# Patient Record
Sex: MALE | Race: BLACK OR AFRICAN AMERICAN | NOT HISPANIC OR LATINO | Employment: OTHER | URBAN - METROPOLITAN AREA
[De-identification: names, ages, dates, MRNs, and addresses within clinical notes are randomized per-mention and may not be internally consistent; named-entity substitution may affect disease eponyms.]

---

## 2017-01-20 RX ORDER — OMEPRAZOLE 40 MG/1
40 CAPSULE, DELAYED RELEASE ORAL DAILY
Qty: 30 CAPSULE | Refills: 0 | Status: SHIPPED | OUTPATIENT
Start: 2017-01-20 | End: 2017-03-07 | Stop reason: SDUPTHER

## 2017-01-27 ENCOUNTER — OFFICE VISIT (OUTPATIENT)
Dept: FAMILY MEDICINE | Facility: CLINIC | Age: 47
End: 2017-01-27
Payer: MEDICARE

## 2017-01-27 VITALS
TEMPERATURE: 98 F | RESPIRATION RATE: 18 BRPM | WEIGHT: 174.63 LBS | BODY MASS INDEX: 30.94 KG/M2 | HEART RATE: 78 BPM | HEIGHT: 63 IN | SYSTOLIC BLOOD PRESSURE: 114 MMHG | DIASTOLIC BLOOD PRESSURE: 82 MMHG | OXYGEN SATURATION: 97 %

## 2017-01-27 DIAGNOSIS — M45.9 ANKYLOSING SPONDYLITIS: ICD-10-CM

## 2017-01-27 DIAGNOSIS — M51.37 DEGENERATION OF LUMBAR OR LUMBOSACRAL INTERVERTEBRAL DISC: Primary | ICD-10-CM

## 2017-01-27 PROCEDURE — 99213 OFFICE O/P EST LOW 20 MIN: CPT | Mod: PBBFAC,PO | Performed by: FAMILY MEDICINE

## 2017-01-27 PROCEDURE — 99214 OFFICE O/P EST MOD 30 MIN: CPT | Mod: S$PBB,,, | Performed by: FAMILY MEDICINE

## 2017-01-27 PROCEDURE — 99999 PR PBB SHADOW E&M-EST. PATIENT-LVL III: CPT | Mod: PBBFAC,,, | Performed by: FAMILY MEDICINE

## 2017-01-27 RX ORDER — OXYCODONE AND ACETAMINOPHEN 10; 325 MG/1; MG/1
1 TABLET ORAL 3 TIMES DAILY PRN
Qty: 90 TABLET | Refills: 0 | Status: SHIPPED | OUTPATIENT
Start: 2017-02-24 | End: 2017-03-26

## 2017-01-27 RX ORDER — OXYCODONE AND ACETAMINOPHEN 10; 325 MG/1; MG/1
1 TABLET ORAL 3 TIMES DAILY PRN
Qty: 90 TABLET | Refills: 0 | Status: SHIPPED | OUTPATIENT
Start: 2017-03-24 | End: 2017-04-27 | Stop reason: SDUPTHER

## 2017-01-27 RX ORDER — OXYCODONE AND ACETAMINOPHEN 10; 325 MG/1; MG/1
1 TABLET ORAL 3 TIMES DAILY PRN
Qty: 90 TABLET | Refills: 0 | Status: SHIPPED | OUTPATIENT
Start: 2017-01-27 | End: 2017-02-26

## 2017-01-27 RX ORDER — FERROUS SULFATE 324(65)MG
324 TABLET, DELAYED RELEASE (ENTERIC COATED) ORAL
COMMUNITY
End: 2017-07-13 | Stop reason: SDUPTHER

## 2017-01-27 NOTE — PROGRESS NOTES
Chief Complaint   Patient presents with    Back Pain       Beau Ferraro is a 46 y.o. male who presents per the Chief Complaint.  Pt is known to me and was last seen by me on 11/14/2016.  All known chronic medical issues have been documented.       Chronic Pain  Past Medical History includes: rheumatoid arthritis and chronic pain syndrome. Patient states that the pain is chronic. Beau describes pain involving the cervical spine, lumbar spine, thoracic spine, knee and hip. The onset of his pain began more than 1 year ago. The frequency of pain is described as constantly. Progression of pain since onset is waxing and waning. Patient describes pain as a 8/10. Beau is currently treating his symptoms with oxycodone/acetaminophen (Percocet) and tramadol. Patient has shown moderate improvement with current treatment.  Goals of therapy include: Improve ADL's. Beau has previously tried hydrocodone/acetaminophen (Hycet, Lorcet, Lortab, Norco,Vicodin), Nerve Block, NSAIDs, Gabapentin (Neurontin) and oxycodone/acetaminophen (Percocet) to treat his pain. Associated symptoms include: abdominal pain, leg pain, paresis, tingling and ADLs performed poorly. Previous Imaging studies include: MRI Scan and X-Ray.  Patient has not had a drug screen. Patient does have a pain contract. Prescription Monitoring Program has been reviewed.. Patient's history of substance abuse includes: none. Patient's psychiatric disorders include: none.       ROS  Review of Systems   Constitutional: Negative.  Negative for activity change, appetite change, chills, diaphoresis, fatigue, fever, unexpected weight change and weight loss.   HENT: Negative.  Negative for congestion, ear pain, hearing loss, nosebleeds, postnasal drip, rhinorrhea, sinus pressure, sneezing, sore throat and trouble swallowing.    Eyes: Negative for pain and visual disturbance.   Respiratory: Negative for cough, choking, chest tightness and shortness of breath.   "  Cardiovascular: Negative for chest pain and leg swelling.   Gastrointestinal: Positive for abdominal pain. Negative for abdominal distention, anal bleeding, blood in stool, bowel incontinence, constipation, diarrhea, nausea, rectal pain and vomiting.   Genitourinary: Negative for bladder incontinence, difficulty urinating, dysuria, frequency and urgency.   Musculoskeletal: Positive for arthralgias, back pain, gait problem, myalgias, neck pain and neck stiffness. Negative for joint swelling.   Skin: Negative.    Allergic/Immunologic: Negative for environmental allergies and food allergies.   Neurological: Positive for tingling. Negative for dizziness, seizures, syncope, weakness, light-headedness, numbness, headaches and paresthesias.   Psychiatric/Behavioral: Negative.  Negative for confusion, decreased concentration, dysphoric mood and sleep disturbance. The patient is not nervous/anxious.        Physical Exam  Vitals:    01/27/17 1455   BP: 114/82   Pulse: 78   Resp: 18   Temp: 98.2 °F (36.8 °C)    Body mass index is 30.93 kg/(m^2).  Weight: 79.2 kg (174 lb 9.7 oz)   Height: 5' 3" (160 cm)     Physical Exam   Constitutional: He is oriented to person, place, and time. He appears well-developed and well-nourished. He is active and cooperative.  Non-toxic appearance. He does not have a sickly appearance. He does not appear ill. No distress.   HENT:   Head: Normocephalic and atraumatic.   Right Ear: Hearing, external ear and ear canal normal. No decreased hearing is noted.   Left Ear: Hearing and external ear normal. No decreased hearing is noted.   Nose: Nose normal. No rhinorrhea or nasal deformity.   Mouth/Throat: Uvula is midline and oropharynx is clear and moist. He does not have dentures. Normal dentition.   Eyes: Conjunctivae, EOM and lids are normal. Pupils are equal, round, and reactive to light. Right eye exhibits no chemosis, no discharge and no exudate. No foreign body present in the right eye. Left eye " exhibits no chemosis, no discharge and no exudate. No foreign body present in the left eye. No scleral icterus.   Neck: Normal range of motion and full passive range of motion without pain. Neck supple. No thyromegaly present.   Cardiovascular: Normal rate, regular rhythm, S1 normal, S2 normal and normal heart sounds.  Exam reveals no gallop and no friction rub.    No murmur heard.  Pulmonary/Chest: Effort normal and breath sounds normal. No accessory muscle usage. No respiratory distress. He has no decreased breath sounds. He has no wheezes. He has no rhonchi. He has no rales.   Abdominal: Soft. Normal appearance. He exhibits no distension and no mass. There is no hepatosplenomegaly. There is no tenderness. There is no rigidity, no rebound and no guarding.   Musculoskeletal:        Right hip: He exhibits tenderness and bony tenderness. He exhibits normal range of motion, normal strength, no swelling, no crepitus, no deformity and no laceration.        Left hip: He exhibits tenderness and bony tenderness. He exhibits normal range of motion, normal strength, no swelling, no crepitus, no deformity and no laceration.        Cervical back: He exhibits decreased range of motion and pain. He exhibits no tenderness, no bony tenderness, no swelling, no edema, no deformity, no laceration, no spasm and normal pulse.        Lumbar back: He exhibits decreased range of motion, bony tenderness and pain. He exhibits no tenderness, no swelling, no edema, no deformity, no laceration, no spasm and normal pulse.   Neurological: He is alert and oriented to person, place, and time. He has normal strength. No cranial nerve deficit or sensory deficit. He exhibits normal muscle tone. He displays no seizure activity. Coordination and gait normal.   Skin: Skin is warm, dry and intact. No rash noted. He is not diaphoretic. No erythema. No pallor.   Covered in multiple tattoos over face, torso, and arms.   Psychiatric: His speech is normal and  behavior is normal. Judgment and thought content normal. His mood appears anxious. His affect is angry. His affect is not blunt, not labile and not inappropriate. Cognition and memory are normal. He does not exhibit a depressed mood. He is attentive.       Assessment & Plan    1. Degeneration of lumbar or lumbosacral intervertebral disc  Discussed policy of not writing for oxycodone any longer but because patient was not notified of change and states morphine caused a negative reaction when previously taken and hydrocodone is not effective, I agreed to give a three month supply with referral to Pain Specialist.  I acknowledge that patient has legitimate pain from chronic progressive disease and has not abused or misused his medication.  - Ambulatory referral to Pain Clinic  - oxycodone-acetaminophen (PERCOCET)  mg per tablet; Take 1 tablet by mouth 3 (three) times daily as needed.  Dispense: 90 tablet; Refill: 0  - oxycodone-acetaminophen (PERCOCET)  mg per tablet; Take 1 tablet by mouth 3 (three) times daily as needed.  Dispense: 90 tablet; Refill: 0  - oxycodone-acetaminophen (PERCOCET)  mg per tablet; Take 1 tablet by mouth 3 (three) times daily as needed.  Dispense: 90 tablet; Refill: 0    2. Ankylosing spondylitis  Progressive; will refer to Pain Specialist for further management.  - Ambulatory referral to Pain Clinic    Follow up documented    ACTIVE MEDICAL ISSUES:  Documented in Problem List    PAST MEDICAL HISTORY  Documented    PAST SURGICAL HISTORY:  Documented    SOCIAL HISTORY:  Documented    FAMILY HISTORY:  Documented    ALLERGIES AND MEDICATIONS: updated and reviewed.  Documented    Health Maintenance       Date Due Completion Date    TETANUS VACCINE 6/23/1988 ---    Lipid Panel 2/16/2021 2/16/2016

## 2017-02-06 DIAGNOSIS — M45.9 ANKYLOSING SPONDYLITIS: ICD-10-CM

## 2017-03-07 RX ORDER — OMEPRAZOLE 40 MG/1
40 CAPSULE, DELAYED RELEASE ORAL DAILY
Qty: 30 CAPSULE | Refills: 0 | Status: SHIPPED | OUTPATIENT
Start: 2017-03-07 | End: 2017-03-31 | Stop reason: SDUPTHER

## 2017-03-31 RX ORDER — OMEPRAZOLE 40 MG/1
40 CAPSULE, DELAYED RELEASE ORAL DAILY
Qty: 30 CAPSULE | Refills: 0 | Status: SHIPPED | OUTPATIENT
Start: 2017-03-31 | End: 2017-05-03 | Stop reason: SDUPTHER

## 2017-04-27 ENCOUNTER — OFFICE VISIT (OUTPATIENT)
Dept: FAMILY MEDICINE | Facility: CLINIC | Age: 47
End: 2017-04-27
Payer: MEDICARE

## 2017-04-27 VITALS
BODY MASS INDEX: 30.43 KG/M2 | HEART RATE: 68 BPM | TEMPERATURE: 98 F | OXYGEN SATURATION: 99 % | HEIGHT: 63 IN | WEIGHT: 171.75 LBS | RESPIRATION RATE: 17 BRPM | DIASTOLIC BLOOD PRESSURE: 96 MMHG | SYSTOLIC BLOOD PRESSURE: 138 MMHG

## 2017-04-27 DIAGNOSIS — M51.37 DEGENERATION OF LUMBAR OR LUMBOSACRAL INTERVERTEBRAL DISC: Primary | ICD-10-CM

## 2017-04-27 DIAGNOSIS — K50.10 CROHN'S DISEASE OF LARGE INTESTINE WITHOUT COMPLICATION: ICD-10-CM

## 2017-04-27 DIAGNOSIS — M45.7 ANKYLOSING SPONDYLITIS OF LUMBOSACRAL REGION: ICD-10-CM

## 2017-04-27 DIAGNOSIS — L30.9 FACIAL DERMATITIS: ICD-10-CM

## 2017-04-27 PROCEDURE — 99215 OFFICE O/P EST HI 40 MIN: CPT | Mod: S$PBB,,, | Performed by: FAMILY MEDICINE

## 2017-04-27 PROCEDURE — 96372 THER/PROPH/DIAG INJ SC/IM: CPT | Mod: PBBFAC,PO

## 2017-04-27 PROCEDURE — 99213 OFFICE O/P EST LOW 20 MIN: CPT | Mod: PBBFAC,PO,25 | Performed by: FAMILY MEDICINE

## 2017-04-27 PROCEDURE — 99999 PR PBB SHADOW E&M-EST. PATIENT-LVL III: CPT | Mod: PBBFAC,,, | Performed by: FAMILY MEDICINE

## 2017-04-27 RX ORDER — KETOROLAC TROMETHAMINE 30 MG/ML
60 INJECTION, SOLUTION INTRAMUSCULAR; INTRAVENOUS
Status: COMPLETED | OUTPATIENT
Start: 2017-04-27 | End: 2017-04-27

## 2017-04-27 RX ORDER — OXYCODONE AND ACETAMINOPHEN 10; 325 MG/1; MG/1
1 TABLET ORAL 3 TIMES DAILY PRN
Qty: 90 TABLET | Refills: 0 | Status: SHIPPED | OUTPATIENT
Start: 2017-05-25 | End: 2017-06-24

## 2017-04-27 RX ORDER — CLOTRIMAZOLE AND BETAMETHASONE DIPROPIONATE 10; .64 MG/G; MG/G
CREAM TOPICAL 2 TIMES DAILY
Qty: 45 G | Refills: 5 | Status: SHIPPED | OUTPATIENT
Start: 2017-04-27 | End: 2018-01-19

## 2017-04-27 RX ORDER — OXYCODONE AND ACETAMINOPHEN 10; 325 MG/1; MG/1
1 TABLET ORAL 3 TIMES DAILY PRN
Qty: 90 TABLET | Refills: 0 | Status: SHIPPED | OUTPATIENT
Start: 2017-06-22 | End: 2017-07-13 | Stop reason: SDUPTHER

## 2017-04-27 RX ORDER — OXYCODONE AND ACETAMINOPHEN 10; 325 MG/1; MG/1
1 TABLET ORAL 3 TIMES DAILY PRN
Qty: 90 TABLET | Refills: 0 | Status: SHIPPED | OUTPATIENT
Start: 2017-04-27 | End: 2017-05-27

## 2017-04-27 RX ADMIN — KETOROLAC TROMETHAMINE 60 MG: 60 INJECTION, SOLUTION INTRAMUSCULAR at 03:04

## 2017-05-03 RX ORDER — OMEPRAZOLE 40 MG/1
40 CAPSULE, DELAYED RELEASE ORAL DAILY
Qty: 30 CAPSULE | Refills: 0 | Status: SHIPPED | OUTPATIENT
Start: 2017-05-03 | End: 2019-09-03

## 2017-05-08 NOTE — PROGRESS NOTES
Chief Complaint   Patient presents with    Ankylosing Spondylitis     3 months follow up        Beau Ferraro is a 46 y.o. male who presents per the Chief Complaint.  Pt is known to me and was last seen by me on 1/27/2017.  All known chronic medical issues have been documented.       Chronic Pain  Past Medical History includes: rheumatoid arthritis and chronic pain syndrome. Patient states that the pain is chronic. Beau describes pain involving the cervical spine, lumbar spine, thoracic spine, knee and hip. The onset of his pain began more than 1 year ago. The frequency of pain is described as constantly. Progression of pain since onset is waxing and waning. Patient describes pain as a 8/10. Beau is currently treating his symptoms with oxycodone/acetaminophen (Percocet) and tramadol. Patient has shown moderate improvement with current treatment.  Goals of therapy include: Improve ADL's. Beau has previously tried hydrocodone/acetaminophen (Hycet, Lorcet, Lortab, Norco,Vicodin), Nerve Block, NSAIDs, Gabapentin (Neurontin) and oxycodone/acetaminophen (Percocet) to treat his pain. Associated symptoms include: abdominal pain, leg pain, paresis, tingling and ADLs performed poorly. Previous Imaging studies include: MRI Scan and X-Ray.  Patient has not had a drug screen. Patient does have a pain contract. Prescription Monitoring Program has been reviewed.. Patient's history of substance abuse includes: none. Patient's psychiatric disorders include: none.       ROS  Review of Systems   Constitutional: Negative.  Negative for activity change, appetite change, chills, diaphoresis, fatigue, fever, unexpected weight change and weight loss.   HENT: Negative.  Negative for congestion, ear pain, hearing loss, nosebleeds, postnasal drip, rhinorrhea, sinus pressure, sneezing, sore throat and trouble swallowing.    Eyes: Negative for pain and visual disturbance.   Respiratory: Negative for cough, choking, chest tightness and  "shortness of breath.    Cardiovascular: Negative for chest pain and leg swelling.   Gastrointestinal: Positive for abdominal pain. Negative for abdominal distention, anal bleeding, blood in stool, bowel incontinence, constipation, diarrhea, nausea, rectal pain and vomiting.   Genitourinary: Negative for bladder incontinence, difficulty urinating, dysuria, frequency and urgency.   Musculoskeletal: Positive for arthralgias, back pain, gait problem, myalgias, neck pain and neck stiffness. Negative for joint swelling.   Skin: Negative.    Allergic/Immunologic: Negative for environmental allergies and food allergies.   Neurological: Positive for tingling. Negative for dizziness, seizures, syncope, weakness, light-headedness, numbness, headaches and paresthesias.   Psychiatric/Behavioral: Negative.  Negative for confusion, decreased concentration, dysphoric mood and sleep disturbance. The patient is not nervous/anxious.        Physical Exam  Vitals:    04/27/17 1430   BP: (!) 138/96   Pulse: 68   Resp: 17   Temp: 98.4 °F (36.9 °C)    Body mass index is 30.42 kg/(m^2).  Weight: 77.9 kg (171 lb 11.8 oz)   Height: 5' 3" (160 cm)     Physical Exam   Constitutional: He is oriented to person, place, and time. He appears well-developed and well-nourished. He is active and cooperative.  Non-toxic appearance. He does not have a sickly appearance. He appears ill. No distress.   HENT:   Head: Normocephalic and atraumatic.   Right Ear: Hearing, external ear and ear canal normal. No decreased hearing is noted.   Left Ear: Hearing and external ear normal. No decreased hearing is noted.   Nose: Nose normal. No rhinorrhea or nasal deformity.   Mouth/Throat: Uvula is midline and oropharynx is clear and moist. He does not have dentures. Normal dentition.   Eyes: Conjunctivae, EOM and lids are normal. Pupils are equal, round, and reactive to light. Right eye exhibits no chemosis, no discharge and no exudate. No foreign body present in the " right eye. Left eye exhibits no chemosis, no discharge and no exudate. No foreign body present in the left eye. No scleral icterus.   Neck: Normal range of motion and full passive range of motion without pain. Neck supple. No thyromegaly present.   Cardiovascular: Normal rate, regular rhythm, S1 normal, S2 normal and normal heart sounds.  Exam reveals no gallop and no friction rub.    No murmur heard.  Pulmonary/Chest: Effort normal and breath sounds normal. No accessory muscle usage. No respiratory distress. He has no decreased breath sounds. He has no wheezes. He has no rhonchi. He has no rales.   Abdominal: Soft. Normal appearance. He exhibits no distension and no mass. There is no hepatosplenomegaly. There is no tenderness. There is no rigidity, no rebound and no guarding.   Musculoskeletal:        Right hip: He exhibits tenderness and bony tenderness. He exhibits normal range of motion, normal strength, no swelling, no crepitus, no deformity and no laceration.        Left hip: He exhibits tenderness and bony tenderness. He exhibits normal range of motion, normal strength, no swelling, no crepitus, no deformity and no laceration.        Cervical back: He exhibits decreased range of motion and pain. He exhibits no tenderness, no bony tenderness, no swelling, no edema, no deformity, no laceration, no spasm and normal pulse.        Lumbar back: He exhibits decreased range of motion, bony tenderness and pain. He exhibits no tenderness, no swelling, no edema, no deformity, no laceration, no spasm and normal pulse.   Neurological: He is alert and oriented to person, place, and time. He has normal strength. No cranial nerve deficit or sensory deficit. He exhibits normal muscle tone. He displays no seizure activity. Coordination and gait normal.   Skin: Skin is warm, dry and intact. No rash noted. He is not diaphoretic. No erythema. No pallor.   Covered in multiple tattoos over face, torso, and arms.   Psychiatric: He  has a normal mood and affect. His speech is normal and behavior is normal. Judgment and thought content normal. His mood appears not anxious. His affect is not angry, not blunt, not labile and not inappropriate. Cognition and memory are normal. He does not exhibit a depressed mood. He is attentive.       Assessment & Plan    1. Degeneration of lumbar or lumbosacral intervertebral disc  The current medical regimen is effective at this time and no changes to present plan or medications will be made at this visit.  Discussed rules regarding chronic pain management with opiate/opioid therapy.  Discussed use of alternative medications to manage pain with goal of reducing and possibly discontinue opioid therapy, and advised that in order to continue current therapy they would need to  paper prescription every month and schedule appointment at least once every 90 days, as well as consent to random urine drug screening.  The legitimate medical purpose of using a controlled substance for pain management is chronic pain due to DDD and AS.  Patient has been screened through the [facility] and is not receiving controlled substances from any other provider.  At this time, I have no suspicion of illegal activity and feel that with proper usage, there is low risk for overdose.     - oxycodone-acetaminophen (PERCOCET)  mg per tablet; Take 1 tablet by mouth 3 (three) times daily as needed.  Dispense: 90 tablet; Refill: 0  - oxycodone-acetaminophen (PERCOCET)  mg per tablet; Take 1 tablet by mouth 3 (three) times daily as needed.  Dispense: 90 tablet; Refill: 0  - oxycodone-acetaminophen (PERCOCET)  mg per tablet; Take 1 tablet by mouth 3 (three) times daily as needed.  Dispense: 90 tablet; Refill: 0  - ketorolac injection 60 mg; Inject 2 mLs (60 mg total) into the muscle one time.    2. Ankylosing spondylitis of lumbosacral region  Managed by Rheumatology; will continue pain management.    3. Crohn's  disease of large intestine without complication  Stable; no therapeutic changes at this time.     4. Facial dermatitis  The current medical regimen is effective at this time and no changes to present plan or medications will be made at this visit.     - clotrimazole-betamethasone 1-0.05% (LOTRISONE) cream; Apply topically 2 (two) times daily.  Dispense: 45 g; Refill: 5      Follow up documented    ACTIVE MEDICAL ISSUES:  Documented in Problem List    PAST MEDICAL HISTORY  Documented    PAST SURGICAL HISTORY:  Documented    SOCIAL HISTORY:  Documented    FAMILY HISTORY:  Documented    ALLERGIES AND MEDICATIONS: updated and reviewed.  Documented    Health Maintenance       Date Due Completion Date    TETANUS VACCINE 6/23/1988 ---    Influenza Vaccine 8/1/2017 9/1/2016    Lipid Panel 2/16/2021 2/16/2016

## 2017-07-13 ENCOUNTER — OFFICE VISIT (OUTPATIENT)
Dept: FAMILY MEDICINE | Facility: CLINIC | Age: 47
End: 2017-07-13
Payer: MEDICARE

## 2017-07-13 VITALS
RESPIRATION RATE: 15 BRPM | SYSTOLIC BLOOD PRESSURE: 130 MMHG | TEMPERATURE: 99 F | OXYGEN SATURATION: 97 % | HEIGHT: 63 IN | HEART RATE: 70 BPM | DIASTOLIC BLOOD PRESSURE: 88 MMHG

## 2017-07-13 DIAGNOSIS — M51.37 DEGENERATION OF LUMBAR OR LUMBOSACRAL INTERVERTEBRAL DISC: ICD-10-CM

## 2017-07-13 DIAGNOSIS — M45.7 ANKYLOSING SPONDYLITIS OF LUMBOSACRAL REGION: Primary | ICD-10-CM

## 2017-07-13 PROCEDURE — 99213 OFFICE O/P EST LOW 20 MIN: CPT | Mod: PBBFAC,PO | Performed by: FAMILY MEDICINE

## 2017-07-13 PROCEDURE — 99999 PR PBB SHADOW E&M-EST. PATIENT-LVL III: CPT | Mod: PBBFAC,,, | Performed by: FAMILY MEDICINE

## 2017-07-13 PROCEDURE — 99215 OFFICE O/P EST HI 40 MIN: CPT | Mod: S$PBB,,, | Performed by: FAMILY MEDICINE

## 2017-07-13 RX ORDER — OXYCODONE AND ACETAMINOPHEN 10; 325 MG/1; MG/1
1 TABLET ORAL 3 TIMES DAILY PRN
Qty: 90 TABLET | Refills: 0 | Status: SHIPPED | OUTPATIENT
Start: 2017-08-17 | End: 2017-09-16

## 2017-07-13 RX ORDER — FERROUS SULFATE 325(65) MG
324 TABLET, DELAYED RELEASE (ENTERIC COATED) ORAL
COMMUNITY
End: 2018-01-19 | Stop reason: ALTCHOICE

## 2017-07-13 RX ORDER — OXYCODONE AND ACETAMINOPHEN 10; 325 MG/1; MG/1
1 TABLET ORAL 3 TIMES DAILY PRN
Qty: 90 TABLET | Refills: 0 | Status: SHIPPED | OUTPATIENT
Start: 2017-07-20 | End: 2017-08-19

## 2017-07-13 RX ORDER — OXYCODONE AND ACETAMINOPHEN 10; 325 MG/1; MG/1
1 TABLET ORAL 3 TIMES DAILY PRN
Qty: 90 TABLET | Refills: 0 | Status: SHIPPED | OUTPATIENT
Start: 2017-09-14 | End: 2017-10-12 | Stop reason: SDUPTHER

## 2017-07-13 NOTE — PROGRESS NOTES
Chief Complaint   Patient presents with    lt foot pain and swollen    hands swollen       Beau Ferraro is a 47 y.o. male who presents per the Chief Complaint.  Pt is known to me and was last seen by me on 4/27/2017.  All known chronic medical issues have been documented.       Chronic Pain  Past Medical History includes: rheumatoid arthritis and chronic pain syndrome. Patient states that the pain is chronic. Beau describes pain involving the cervical spine, lumbar spine, thoracic spine, knee and hip. The onset of his pain began more than 1 year ago. The frequency of pain is described as constantly. Progression of pain since onset is waxing and waning. Patient describes pain as a 8/10. Beau is currently treating his symptoms with oxycodone/acetaminophen (Percocet) and tramadol. Patient has shown moderate improvement with current treatment.  Goals of therapy include: Improve ADL's. Beau has previously tried hydrocodone/acetaminophen (Hycet, Lorcet, Lortab, Norco,Vicodin), Nerve Block, NSAIDs, Gabapentin (Neurontin) and oxycodone/acetaminophen (Percocet) to treat his pain. Associated symptoms include: leg pain, paresis, tingling and ADLs performed poorly. Previous Imaging studies include: MRI Scan and X-Ray.  Patient has not had a drug screen. Patient does have a pain contract. Prescription Monitoring Program has been reviewed.. Patient's history of substance abuse includes: none. Patient's psychiatric disorders include: none.       ROS  Review of Systems   Constitutional: Negative.  Negative for activity change, appetite change, chills, diaphoresis, fatigue, fever, unexpected weight change and weight loss.   HENT: Negative.  Negative for congestion, ear pain, hearing loss, nosebleeds, postnasal drip, rhinorrhea, sinus pressure, sneezing, sore throat and trouble swallowing.    Eyes: Negative for pain and visual disturbance.   Respiratory: Negative for cough, choking, chest tightness and shortness of  "breath.    Cardiovascular: Negative for chest pain and leg swelling.   Gastrointestinal: Negative for abdominal distention, abdominal pain, anal bleeding, blood in stool, bowel incontinence, constipation, diarrhea, nausea, rectal pain and vomiting.   Genitourinary: Negative for bladder incontinence, difficulty urinating, dysuria, frequency and urgency.   Musculoskeletal: Positive for arthralgias, back pain, gait problem, myalgias, neck pain and neck stiffness. Negative for joint swelling.   Skin: Negative.    Allergic/Immunologic: Negative for environmental allergies and food allergies.   Neurological: Positive for tingling. Negative for dizziness, seizures, syncope, weakness, light-headedness, numbness, headaches and paresthesias.   Psychiatric/Behavioral: Negative.  Negative for confusion, decreased concentration, dysphoric mood and sleep disturbance. The patient is not nervous/anxious.        Physical Exam  Vitals:    07/13/17 1408   BP: 130/88   Pulse: 70   Resp: 15   Temp: 98.8 °F (37.1 °C)    There is no height or weight on file to calculate BMI.      Height: 5' 3" (160 cm)     Physical Exam   Constitutional: He is oriented to person, place, and time. He appears well-developed and well-nourished. He is active and cooperative.  Non-toxic appearance. He does not have a sickly appearance. He appears ill. No distress.   HENT:   Head: Normocephalic and atraumatic.   Right Ear: Hearing, external ear and ear canal normal. No decreased hearing is noted.   Left Ear: Hearing and external ear normal. No decreased hearing is noted.   Nose: Nose normal. No rhinorrhea or nasal deformity.   Mouth/Throat: Uvula is midline and oropharynx is clear and moist. He does not have dentures. Normal dentition.   Eyes: Conjunctivae, EOM and lids are normal. Pupils are equal, round, and reactive to light. Right eye exhibits no chemosis, no discharge and no exudate. No foreign body present in the right eye. Left eye exhibits no chemosis, " no discharge and no exudate. No foreign body present in the left eye. No scleral icterus.   Neck: Normal range of motion and full passive range of motion without pain. Neck supple. No thyromegaly present.   Cardiovascular: Normal rate, regular rhythm, S1 normal, S2 normal and normal heart sounds.  Exam reveals no gallop and no friction rub.    No murmur heard.  Pulmonary/Chest: Effort normal and breath sounds normal. No accessory muscle usage. No respiratory distress. He has no decreased breath sounds. He has no wheezes. He has no rhonchi. He has no rales.   Abdominal: Soft. Normal appearance. He exhibits no distension and no mass. There is no hepatosplenomegaly. There is no tenderness. There is no rigidity, no rebound and no guarding.   Musculoskeletal:        Right hip: He exhibits tenderness and bony tenderness. He exhibits normal range of motion, normal strength, no swelling, no crepitus, no deformity and no laceration.        Left hip: He exhibits tenderness and bony tenderness. He exhibits normal range of motion, normal strength, no swelling, no crepitus, no deformity and no laceration.        Cervical back: He exhibits decreased range of motion and pain. He exhibits no tenderness, no bony tenderness, no swelling, no edema, no deformity, no laceration, no spasm and normal pulse.        Lumbar back: He exhibits decreased range of motion, bony tenderness and pain. He exhibits no tenderness, no swelling, no edema, no deformity, no laceration, no spasm and normal pulse.        Right foot: There is decreased range of motion, tenderness and swelling. There is no bony tenderness, normal capillary refill, no crepitus, no deformity and no laceration.        Left foot: There is decreased range of motion, tenderness and swelling. There is no bony tenderness, normal capillary refill, no crepitus, no deformity and no laceration.   Neurological: He is alert and oriented to person, place, and time. He has normal strength. No  cranial nerve deficit or sensory deficit. He exhibits normal muscle tone. He displays no seizure activity. Coordination and gait normal.   Skin: Skin is warm, dry and intact. No rash noted. He is not diaphoretic. No erythema. No pallor.   Covered in multiple tattoos over face, torso, and arms.   Psychiatric: He has a normal mood and affect. His speech is normal and behavior is normal. Judgment and thought content normal. His mood appears not anxious. His affect is not angry, not blunt, not labile and not inappropriate. Cognition and memory are normal. He does not exhibit a depressed mood. He is attentive.       Assessment & Plan    1. Ankylosing spondylitis of lumbosacral region  Chronic condition with no expected resolution or improvement.  Managed by Rheumatology.    2. Degeneration of lumbar or lumbosacral intervertebral disc  Discussed rules regarding chronic pain management with opiate/opioid therapy.  Discussed use of alternative medications to manage pain with goal of reducing and possibly discontinue opioid therapy, and advised that in order to continue current therapy they need a scheduled appointment at least once every 90 days, as well as consent to random urine drug screening.  The legitimate medical purpose of using a controlled substance for pain management is chronic pain associated with AS and chronic inflammatory arthritis.  Patient has been screened through the [facility] and is not receiving controlled substances from any other provider.  At this time, I have no suspicion of illegal activity and feel that with proper usage, there is low risk for overdose.     - oxycodone-acetaminophen (PERCOCET)  mg per tablet; Take 1 tablet by mouth 3 (three) times daily as needed.  Dispense: 90 tablet; Refill: 0  - oxycodone-acetaminophen (PERCOCET)  mg per tablet; Take 1 tablet by mouth 3 (three) times daily as needed.  Dispense: 90 tablet; Refill: 0  - oxycodone-acetaminophen (PERCOCET)   mg per tablet; Take 1 tablet by mouth 3 (three) times daily as needed.  Dispense: 90 tablet; Refill: 0      Follow up documented    ACTIVE MEDICAL ISSUES:  Documented in Problem List    PAST MEDICAL HISTORY  Documented    PAST SURGICAL HISTORY:  Documented    SOCIAL HISTORY:  Documented    FAMILY HISTORY:  Documented    ALLERGIES AND MEDICATIONS: updated and reviewed.  Documented    Health Maintenance       Date Due Completion Date    TETANUS VACCINE 06/23/1988 ---    Influenza Vaccine 08/01/2017 9/1/2016    Lipid Panel 02/16/2021 2/16/2016

## 2017-09-20 ENCOUNTER — TELEPHONE (OUTPATIENT)
Dept: RHEUMATOLOGY | Facility: CLINIC | Age: 47
End: 2017-09-20

## 2017-09-20 ENCOUNTER — LAB VISIT (OUTPATIENT)
Dept: LAB | Facility: HOSPITAL | Age: 47
End: 2017-09-20
Attending: INTERNAL MEDICINE
Payer: MEDICARE

## 2017-09-20 ENCOUNTER — TELEPHONE (OUTPATIENT)
Dept: PHARMACY | Facility: CLINIC | Age: 47
End: 2017-09-20

## 2017-09-20 ENCOUNTER — OFFICE VISIT (OUTPATIENT)
Dept: RHEUMATOLOGY | Facility: CLINIC | Age: 47
End: 2017-09-20
Payer: MEDICARE

## 2017-09-20 VITALS
DIASTOLIC BLOOD PRESSURE: 79 MMHG | SYSTOLIC BLOOD PRESSURE: 120 MMHG | BODY MASS INDEX: 26.97 KG/M2 | HEART RATE: 76 BPM | HEIGHT: 66 IN | WEIGHT: 167.81 LBS

## 2017-09-20 DIAGNOSIS — M47.899 HLA-B27 SPONDYLOARTHROPATHY: Primary | ICD-10-CM

## 2017-09-20 DIAGNOSIS — Z79.60 LONG-TERM USE OF IMMUNOSUPPRESSANT MEDICATION: ICD-10-CM

## 2017-09-20 DIAGNOSIS — M45.0 ANKYLOSING SPONDYLITIS OF MULTIPLE SITES IN SPINE: ICD-10-CM

## 2017-09-20 DIAGNOSIS — E55.9 VITAMIN D INSUFFICIENCY: ICD-10-CM

## 2017-09-20 DIAGNOSIS — M81.0 OSTEOPOROSIS WITHOUT CURRENT PATHOLOGICAL FRACTURE, UNSPECIFIED OSTEOPOROSIS TYPE: ICD-10-CM

## 2017-09-20 DIAGNOSIS — M06.4 INFLAMMATORY POLYARTHRITIS: Primary | ICD-10-CM

## 2017-09-20 DIAGNOSIS — M06.4 INFLAMMATORY POLYARTHRITIS: ICD-10-CM

## 2017-09-20 LAB
25(OH)D3+25(OH)D2 SERPL-MCNC: 25 NG/ML
ALBUMIN SERPL BCP-MCNC: 3.5 G/DL
ALP SERPL-CCNC: 99 U/L
ALT SERPL W/O P-5'-P-CCNC: 23 U/L
ANION GAP SERPL CALC-SCNC: 8 MMOL/L
AST SERPL-CCNC: 21 U/L
BASOPHILS # BLD AUTO: 0.02 K/UL
BASOPHILS NFR BLD: 0.4 %
BILIRUB SERPL-MCNC: 0.4 MG/DL
BUN SERPL-MCNC: 18 MG/DL
CALCIUM SERPL-MCNC: 8.7 MG/DL
CHLORIDE SERPL-SCNC: 109 MMOL/L
CO2 SERPL-SCNC: 22 MMOL/L
CREAT SERPL-MCNC: 1.1 MG/DL
CRP SERPL-MCNC: 0.9 MG/L
DIFFERENTIAL METHOD: ABNORMAL
EOSINOPHIL # BLD AUTO: 0.1 K/UL
EOSINOPHIL NFR BLD: 2.2 %
ERYTHROCYTE [DISTWIDTH] IN BLOOD BY AUTOMATED COUNT: 14.5 %
ERYTHROCYTE [SEDIMENTATION RATE] IN BLOOD BY WESTERGREN METHOD: 5 MM/HR
EST. GFR  (AFRICAN AMERICAN): >60 ML/MIN/1.73 M^2
EST. GFR  (NON AFRICAN AMERICAN): >60 ML/MIN/1.73 M^2
GLUCOSE SERPL-MCNC: 82 MG/DL
HCT VFR BLD AUTO: 37.1 %
HGB BLD-MCNC: 11.9 G/DL
LYMPHOCYTES # BLD AUTO: 3.1 K/UL
LYMPHOCYTES NFR BLD: 56.7 %
MCH RBC QN AUTO: 22.7 PG
MCHC RBC AUTO-ENTMCNC: 32.1 G/DL
MCV RBC AUTO: 71 FL
MONOCYTES # BLD AUTO: 0.4 K/UL
MONOCYTES NFR BLD: 7.6 %
NEUTROPHILS # BLD AUTO: 1.8 K/UL
NEUTROPHILS NFR BLD: 33.1 %
PLATELET # BLD AUTO: 226 K/UL
PMV BLD AUTO: 10.5 FL
POTASSIUM SERPL-SCNC: 3.8 MMOL/L
PROT SERPL-MCNC: 7.1 G/DL
RBC # BLD AUTO: 5.25 M/UL
SODIUM SERPL-SCNC: 139 MMOL/L
WBC # BLD AUTO: 5.5 K/UL

## 2017-09-20 PROCEDURE — 80053 COMPREHEN METABOLIC PANEL: CPT

## 2017-09-20 PROCEDURE — 85025 COMPLETE CBC W/AUTO DIFF WBC: CPT

## 2017-09-20 PROCEDURE — 82306 VITAMIN D 25 HYDROXY: CPT

## 2017-09-20 PROCEDURE — 85651 RBC SED RATE NONAUTOMATED: CPT

## 2017-09-20 PROCEDURE — 36415 COLL VENOUS BLD VENIPUNCTURE: CPT

## 2017-09-20 PROCEDURE — 99214 OFFICE O/P EST MOD 30 MIN: CPT | Mod: PBBFAC | Performed by: INTERNAL MEDICINE

## 2017-09-20 PROCEDURE — 86140 C-REACTIVE PROTEIN: CPT

## 2017-09-20 PROCEDURE — 99999 PR PBB SHADOW E&M-EST. PATIENT-LVL IV: CPT | Mod: PBBFAC,,, | Performed by: INTERNAL MEDICINE

## 2017-09-20 PROCEDURE — 99215 OFFICE O/P EST HI 40 MIN: CPT | Mod: S$PBB,,, | Performed by: INTERNAL MEDICINE

## 2017-09-20 NOTE — TELEPHONE ENCOUNTER
LVM- [facility] received a prescription for Humira and we will contact their insurance company to find out if the medication is covered. We will update patient of status as more information is received. . feel free to give us a call with  any questions at [phone].

## 2017-09-20 NOTE — TELEPHONE ENCOUNTER
Done    ----- Message from Abena Degroot RN sent at 9/20/2017  5:32 PM CDT -----  Contact: Alicia- [facility]   Please put in OT order  ----- Message -----  From: Patti Moreland  Sent: 9/20/2017   9:35 AM  To: Rub[facility]AS[facility] Staff    Joceline locke stating another order needs to be sent for pt's Occupational Therapy [phone]
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
## 2017-09-20 NOTE — PATIENT INSTRUCTIONS
Keep your calcium intake high.  You need about 1200- 1500 mg of calcium/day.  Read labels  A serving of milk has about 300 mg/day.  [place] milk has about 455 mg/day ([place]).    We will contact you about how much vitamin D to take.    We will try to get Humira for you to take every week.    Exercise daily.     We will arrange OT/PT for your hands & other joints and your back.

## 2017-09-21 ENCOUNTER — TELEPHONE (OUTPATIENT)
Dept: RHEUMATOLOGY | Facility: CLINIC | Age: 47
End: 2017-09-21

## 2017-09-21 NOTE — TELEPHONE ENCOUNTER
Vitamin d low at 25.   OTC vitamin D3 5,000 IU: take 2 capsules daily x 3 months then 2 capsules once a week thereafter.      Patient informed of vitamin d results and the need to  Take OTC vitamin D3.  Verbalizes understanding.

## 2017-10-12 ENCOUNTER — OFFICE VISIT (OUTPATIENT)
Dept: FAMILY MEDICINE | Facility: CLINIC | Age: 47
End: 2017-10-12
Payer: MEDICARE

## 2017-10-12 ENCOUNTER — HOSPITAL ENCOUNTER (OUTPATIENT)
Dept: RADIOLOGY | Facility: HOSPITAL | Age: 47
Discharge: HOME | End: 2017-10-12
Attending: FAMILY MEDICINE
Payer: MEDICARE

## 2017-10-12 VITALS
TEMPERATURE: 99 F | RESPIRATION RATE: 17 BRPM | BODY MASS INDEX: 26.96 KG/M2 | OXYGEN SATURATION: 97 % | HEIGHT: 66 IN | HEART RATE: 94 BPM | WEIGHT: 167.75 LBS | DIASTOLIC BLOOD PRESSURE: 88 MMHG | SYSTOLIC BLOOD PRESSURE: 124 MMHG

## 2017-10-12 DIAGNOSIS — S69.91XA HAND INJURY, RIGHT, INITIAL ENCOUNTER: ICD-10-CM

## 2017-10-12 DIAGNOSIS — M51.37 DEGENERATION OF LUMBAR OR LUMBOSACRAL INTERVERTEBRAL DISC: ICD-10-CM

## 2017-10-12 DIAGNOSIS — M45.0 ANKYLOSING SPONDYLITIS OF MULTIPLE SITES IN SPINE: Primary | ICD-10-CM

## 2017-10-12 PROCEDURE — 99215 OFFICE O/P EST HI 40 MIN: CPT | Mod: S$PBB,,, | Performed by: FAMILY MEDICINE

## 2017-10-12 PROCEDURE — 99999 PR PBB SHADOW E&M-EST. PATIENT-LVL III: CPT | Mod: PBBFAC,,, | Performed by: FAMILY MEDICINE

## 2017-10-12 PROCEDURE — 73130 X-RAY EXAM OF HAND: CPT | Mod: 26,RT,, | Performed by: RADIOLOGY

## 2017-10-12 PROCEDURE — 73130 X-RAY EXAM OF HAND: CPT | Mod: TC,PO,RT

## 2017-10-12 PROCEDURE — 99213 OFFICE O/P EST LOW 20 MIN: CPT | Mod: PBBFAC,25,PO | Performed by: FAMILY MEDICINE

## 2017-10-12 RX ORDER — NALOXONE HYDROCHLORIDE 0.4 MG/ML
INJECTION, SOLUTION INTRAMUSCULAR; INTRAVENOUS; SUBCUTANEOUS
Qty: 2 ML | Refills: 2 | Status: SHIPPED | OUTPATIENT
Start: 2017-10-12 | End: 2018-12-12

## 2017-10-12 RX ORDER — OXYCODONE AND ACETAMINOPHEN 10; 325 MG/1; MG/1
1 TABLET ORAL 3 TIMES DAILY PRN
Qty: 90 TABLET | Refills: 0 | Status: SHIPPED | OUTPATIENT
Start: 2017-12-07 | End: 2017-12-19 | Stop reason: SDUPTHER

## 2017-10-12 RX ORDER — OXYCODONE AND ACETAMINOPHEN 10; 325 MG/1; MG/1
1 TABLET ORAL 3 TIMES DAILY PRN
Qty: 90 TABLET | Refills: 0 | Status: SHIPPED | OUTPATIENT
Start: 2017-10-12 | End: 2017-11-11

## 2017-10-12 RX ORDER — OXYCODONE AND ACETAMINOPHEN 10; 325 MG/1; MG/1
1 TABLET ORAL 3 TIMES DAILY PRN
Qty: 90 TABLET | Refills: 0 | Status: SHIPPED | OUTPATIENT
Start: 2017-11-09 | End: 2017-12-09

## 2017-10-12 NOTE — PROGRESS NOTES
No chief complaint on file.      Beau Ferraro is a 47 y.o. male who presents per the Chief Complaint.  Pt is known to me and was last seen by me on 7/13/2017.  All known chronic medical issues have been documented.   States he fell out of bed 3 days ago and hurt his right hand.  He states he cannot move his hand like before.    Chronic Pain  Past Medical History includes: rheumatoid arthritis and chronic pain syndrome (Ankylosing Spondylitis). Patient states that the pain is chronic. Beau describes pain involving the cervical spine, lumbar spine, thoracic spine, knee and hip. The onset of his pain began more than 1 year ago. The frequency of pain is described as constantly. Progression of pain since onset is waxing and waning. Patient describes pain as a 8/10. Beau is currently treating his symptoms with oxycodone/acetaminophen (Percocet) and tramadol. Patient has shown moderate improvement with current treatment.  Goals of therapy include: Improve ADL's. Beau has previously tried hydrocodone/acetaminophen (Hycet, Lorcet, Lortab, Norco,Vicodin), Nerve Block, NSAIDs, Gabapentin (Neurontin) and oxycodone/acetaminophen (Percocet) to treat his pain. Associated symptoms include: leg pain, paresis, tingling and ADLs performed poorly. Previous Imaging studies include: MRI Scan and X-Ray.  Patient has not had a drug screen. Patient does have a pain contract. Prescription Monitoring Program has been reviewed.. Patient's history of substance abuse includes: none. Patient's psychiatric disorders include: none.  Hand Injury    His dominant hand is their right hand. The incident occurred 3 to 5 days ago. The incident occurred at home. The injury mechanism was a fall. The pain is present in the right hand. The quality of the pain is described as aching. The pain does not radiate. The pain is at a severity of 6/10. The pain is moderate. The pain has been constant since the incident. Associated symptoms include muscle  "weakness and tingling. Pertinent negatives include no chest pain or numbness. The symptoms are aggravated by movement. He has tried acetaminophen for the symptoms. The treatment provided moderate relief.        ROS  Review of Systems   Constitutional: Negative.  Negative for activity change, appetite change, chills, diaphoresis, fatigue, fever, unexpected weight change and weight loss.   HENT: Negative.  Negative for congestion, ear pain, hearing loss, nosebleeds, postnasal drip, rhinorrhea, sinus pressure, sneezing, sore throat and trouble swallowing.    Eyes: Negative for pain and visual disturbance.   Respiratory: Negative for cough, choking, chest tightness and shortness of breath.    Cardiovascular: Negative for chest pain and leg swelling.   Gastrointestinal: Negative for abdominal distention, abdominal pain, anal bleeding, blood in stool, bowel incontinence, constipation, diarrhea, nausea, rectal pain and vomiting.   Genitourinary: Negative for bladder incontinence, difficulty urinating, dysuria, frequency and urgency.   Musculoskeletal: Positive for arthralgias (right hand; most joints), back pain, gait problem, myalgias, neck pain and neck stiffness. Negative for joint swelling.   Skin: Negative.    Allergic/Immunologic: Negative for environmental allergies and food allergies.   Neurological: Positive for tingling. Negative for dizziness, seizures, syncope, weakness, light-headedness, numbness, headaches and paresthesias.   Psychiatric/Behavioral: Negative.  Negative for confusion, decreased concentration, dysphoric mood and sleep disturbance. The patient is not nervous/anxious.        Physical Exam  Vitals:    10/12/17 1404   BP: 124/88   Pulse: 94   Resp: 17   Temp: 98.5 °F (36.9 °C)    Body mass index is 27.08 kg/m².  Weight: 76.1 kg (167 lb 12.3 oz)   Height: 5' 6" (167.6 cm)     Physical Exam   Constitutional: He is oriented to person, place, and time. He appears well-developed and well-nourished. He " is active and cooperative.  Non-toxic appearance. He does not have a sickly appearance. He does not appear ill. No distress.   HENT:   Head: Normocephalic and atraumatic.   Right Ear: Hearing, external ear and ear canal normal. No decreased hearing is noted.   Left Ear: Hearing and external ear normal. No decreased hearing is noted.   Nose: Nose normal. No rhinorrhea or nasal deformity.   Mouth/Throat: Uvula is midline and oropharynx is clear and moist. He does not have dentures. Normal dentition.   Eyes: Conjunctivae, EOM and lids are normal. Pupils are equal, round, and reactive to light. Right eye exhibits no chemosis, no discharge and no exudate. No foreign body present in the right eye. Left eye exhibits no chemosis, no discharge and no exudate. No foreign body present in the left eye. No scleral icterus.   Neck: Normal range of motion and full passive range of motion without pain. Neck supple. No thyromegaly present.   Cardiovascular: Normal rate, regular rhythm, S1 normal, S2 normal and normal heart sounds.  Exam reveals no gallop and no friction rub.    No murmur heard.  Pulmonary/Chest: Effort normal and breath sounds normal. No accessory muscle usage. No respiratory distress. He has no decreased breath sounds. He has no wheezes. He has no rhonchi. He has no rales.   Abdominal: Soft. Normal appearance. He exhibits no distension and no mass. There is no hepatosplenomegaly. There is no tenderness. There is no rigidity, no rebound and no guarding.   Musculoskeletal:        Right hip: He exhibits tenderness and bony tenderness. He exhibits normal range of motion, normal strength, no swelling, no crepitus, no deformity and no laceration.        Left hip: He exhibits tenderness and bony tenderness. He exhibits normal range of motion, normal strength, no swelling, no crepitus, no deformity and no laceration.        Cervical back: He exhibits decreased range of motion and pain. He exhibits no tenderness, no bony  tenderness, no swelling, no edema, no deformity, no laceration, no spasm and normal pulse.        Lumbar back: He exhibits decreased range of motion, bony tenderness and pain. He exhibits no tenderness, no swelling, no edema, no deformity, no laceration, no spasm and normal pulse.        Right hand: He exhibits decreased range of motion, tenderness, bony tenderness, deformity and swelling. He exhibits normal two-point discrimination, normal capillary refill and no laceration. Normal sensation noted. Decreased sensation is not present in the ulnar distribution and is not present in the medial distribution. Decreased strength noted. He exhibits finger abduction and thumb/finger opposition. He exhibits no wrist extension trouble.        Right foot: There is decreased range of motion, tenderness and swelling. There is no bony tenderness, normal capillary refill, no crepitus, no deformity and no laceration.        Left foot: There is decreased range of motion, tenderness and swelling. There is no bony tenderness, normal capillary refill, no crepitus, no deformity and no laceration.   Neurological: He is alert and oriented to person, place, and time. He has normal strength. No cranial nerve deficit or sensory deficit. He exhibits normal muscle tone. He displays no seizure activity. Coordination and gait normal.   Skin: Skin is warm, dry and intact. No rash noted. He is not diaphoretic. No erythema. No pallor.   Covered in multiple tattoos over face, torso, and arms.   Psychiatric: He has a normal mood and affect. His speech is normal and behavior is normal. Judgment and thought content normal. His mood appears not anxious. His affect is not angry, not blunt, not labile and not inappropriate. Cognition and memory are normal. He does not exhibit a depressed mood. He is attentive.       Assessment & Plan    1. Ankylosing spondylitis of multiple sites in spine  Discussed rules regarding chronic pain management with  opiate/opioid therapy.  Discussed use of alternative medications to manage pain with goal of reducing and possibly discontinue opioid therapy, and advised that in order to continue current therapy they would need to schedule appointment at least once every 90 days, as well as consent to random urine drug screening.  The legitimate medical purpose of using a controlled substance for pain management is Ankylosing Spondylitis and lumbar disc disease.  Patient has been screened through the [facility] and is not receiving controlled substances from any other provider.  At this time, I have no suspicion of illegal activity and feel that with proper usage, there is low risk for overdose.  Naloxone kit sent to pharmacy.  - oxycodone-acetaminophen (PERCOCET)  mg per tablet; Take 1 tablet by mouth 3 (three) times daily as needed.  Dispense: 90 tablet; Refill: 0  - oxycodone-acetaminophen (PERCOCET)  mg per tablet; Take 1 tablet by mouth 3 (three) times daily as needed.  Dispense: 90 tablet; Refill: 0  - oxycodone-acetaminophen (PERCOCET)  mg per tablet; Take 1 tablet by mouth 3 (three) times daily as needed.  Dispense: 90 tablet; Refill: 0  - naloxone 0.4 mg/mL Syrg; Inject 1 ml in shoulder or thigh if unresponsive.  Repeat in 2-3 minutes if minimal or no response.  Dispense: 2 mL; Refill: 2    2. Degeneration of lumbar or lumbosacral intervertebral disc  The current medical regimen is effective at this time and no changes to present plan or medications will be made at this visit.     - oxycodone-acetaminophen (PERCOCET)  mg per tablet; Take 1 tablet by mouth 3 (three) times daily as needed.  Dispense: 90 tablet; Refill: 0  - oxycodone-acetaminophen (PERCOCET)  mg per tablet; Take 1 tablet by mouth 3 (three) times daily as needed.  Dispense: 90 tablet; Refill: 0  - oxycodone-acetaminophen (PERCOCET)  mg per tablet; Take 1 tablet by mouth 3 (three) times daily as needed.  Dispense: 90  tablet; Refill: 0  - naloxone 0.4 mg/mL Syrg; Inject 1 ml in shoulder or thigh if unresponsive.  Repeat in 2-3 minutes if minimal or no response.  Dispense: 2 mL; Refill: 2    3. Hand injury, right, initial encounter  Will order imaging to further evaluate and manage accordingly.   - X-Ray Hand Complete Right; Future      Follow up documented    ACTIVE MEDICAL ISSUES:  Documented in Problem List    PAST MEDICAL HISTORY  Documented    PAST SURGICAL HISTORY:  Documented    SOCIAL HISTORY:  Documented    FAMILY HISTORY:  Documented    ALLERGIES AND MEDICATIONS: updated and reviewed.  Documented    Health Maintenance       Date Due Completion Date    TETANUS VACCINE 06/23/1988 ---    Sign Pain Contract 06/23/1988 ---    Naloxone Prescription 06/23/1988 ---    Urine Drug Screen 08/08/2016 2/8/2016    Influenza Vaccine 08/01/2017 9/1/2016    Lipid Panel 02/16/2021 2/16/2016

## 2017-10-19 ENCOUNTER — TELEPHONE (OUTPATIENT)
Dept: FAMILY MEDICINE | Facility: CLINIC | Age: 47
End: 2017-10-19

## 2017-10-19 NOTE — PROGRESS NOTES
Please notify patient that x-ray does not show any fractures in his hand, just arthritis, and schedule follow up  within 2 months.

## 2017-10-19 NOTE — TELEPHONE ENCOUNTER
----- Message from Azikiwe K. Lombard, MD sent at 10/19/2017 11:02 AM CDT -----  Please notify patient that x-ray does not show any fractures in his hand, just arthritis, and schedule follow up  within 2 months.

## 2017-11-06 DIAGNOSIS — M45.0 ANKYLOSING SPONDYLITIS OF MULTIPLE SITES IN SPINE: ICD-10-CM

## 2017-11-06 RX ORDER — ADALIMUMAB 40MG/0.8ML
KIT SUBCUTANEOUS
Qty: 6 EACH | Refills: 3 | Status: SHIPPED | OUTPATIENT
Start: 2017-11-06 | End: 2018-03-08 | Stop reason: SDUPTHER

## 2017-12-19 ENCOUNTER — OFFICE VISIT (OUTPATIENT)
Dept: FAMILY MEDICINE | Facility: CLINIC | Age: 47
End: 2017-12-19
Payer: MEDICARE

## 2017-12-19 VITALS
HEART RATE: 78 BPM | TEMPERATURE: 100 F | OXYGEN SATURATION: 99 % | DIASTOLIC BLOOD PRESSURE: 72 MMHG | WEIGHT: 173.06 LBS | RESPIRATION RATE: 14 BRPM | SYSTOLIC BLOOD PRESSURE: 118 MMHG | BODY MASS INDEX: 27.81 KG/M2 | HEIGHT: 66 IN

## 2017-12-19 DIAGNOSIS — M51.37 DEGENERATION OF LUMBAR OR LUMBOSACRAL INTERVERTEBRAL DISC: ICD-10-CM

## 2017-12-19 DIAGNOSIS — M45.0 ANKYLOSING SPONDYLITIS OF MULTIPLE SITES IN SPINE: ICD-10-CM

## 2017-12-19 PROCEDURE — 99999 PR PBB SHADOW E&M-EST. PATIENT-LVL III: CPT | Mod: PBBFAC,,, | Performed by: FAMILY MEDICINE

## 2017-12-19 PROCEDURE — 99213 OFFICE O/P EST LOW 20 MIN: CPT | Mod: PBBFAC,PO | Performed by: FAMILY MEDICINE

## 2017-12-19 PROCEDURE — 99215 OFFICE O/P EST HI 40 MIN: CPT | Mod: S$PBB,,, | Performed by: FAMILY MEDICINE

## 2017-12-19 RX ORDER — FAMOTIDINE 40 MG/1
40 TABLET, FILM COATED ORAL EVERY 12 HOURS
Refills: 3 | COMMUNITY
Start: 2017-12-01 | End: 2019-09-03 | Stop reason: SDUPTHER

## 2017-12-19 RX ORDER — OXYCODONE AND ACETAMINOPHEN 10; 325 MG/1; MG/1
1 TABLET ORAL 3 TIMES DAILY PRN
Qty: 90 TABLET | Refills: 0 | Status: SHIPPED | OUTPATIENT
Start: 2018-03-01 | End: 2018-03-26 | Stop reason: SDUPTHER

## 2017-12-19 RX ORDER — CLOTRIMAZOLE 1 G/ML
SOLUTION TOPICAL
Refills: 2 | COMMUNITY
Start: 2017-12-02 | End: 2018-01-19

## 2017-12-19 RX ORDER — OXYCODONE AND ACETAMINOPHEN 10; 325 MG/1; MG/1
1 TABLET ORAL 3 TIMES DAILY PRN
Qty: 90 TABLET | Refills: 0 | Status: SHIPPED | OUTPATIENT
Start: 2018-01-04 | End: 2018-02-03

## 2017-12-19 RX ORDER — OXYCODONE AND ACETAMINOPHEN 10; 325 MG/1; MG/1
1 TABLET ORAL 3 TIMES DAILY PRN
Qty: 90 TABLET | Refills: 0 | Status: SHIPPED | OUTPATIENT
Start: 2018-02-01 | End: 2018-03-03

## 2017-12-19 NOTE — PROGRESS NOTES
Chief Complaint   Patient presents with    Back Pain     patient is having a lot of pain       Beau Ferraro is a 47 y.o. male who presents per the Chief Complaint.  Pt is known to me and was last seen by me on 10/12/2017.  All known chronic medical issues have been documented.       Chronic Pain  Past Medical History includes: rheumatoid arthritis and chronic pain syndrome (Ankylosing Spondylitis). Patient states that the pain is chronic. Beau describes pain involving the cervical spine, lumbar spine, thoracic spine, knee and hip. The onset of his pain began more than 1 year ago. The frequency of pain is described as constantly. Progression of pain since onset is waxing and waning. Patient describes pain as a 8/10. Beau is currently treating his symptoms with oxycodone/acetaminophen (Percocet) and tramadol. Patient has shown moderate improvement with current treatment.  Goals of therapy include: Improve ADL's. Beau has previously tried hydrocodone/acetaminophen (Hycet, Lorcet, Lortab, Norco,Vicodin), Nerve Block, NSAIDs, Gabapentin (Neurontin) and oxycodone/acetaminophen (Percocet) to treat his pain. Associated symptoms include: leg pain, paresis, tingling and ADLs performed poorly. Previous Imaging studies include: MRI Scan and X-Ray.  Patient has not had a drug screen. Patient does have a pain contract. Prescription Monitoring Program has been reviewed.. Patient's history of substance abuse includes: none. Patient's psychiatric disorders include: none.       ROS  Review of Systems   Constitutional: Negative.  Negative for activity change, appetite change, chills, diaphoresis, fatigue, fever, unexpected weight change and weight loss.   HENT: Negative.  Negative for congestion, ear pain, hearing loss, nosebleeds, postnasal drip, rhinorrhea, sinus pressure, sneezing, sore throat and trouble swallowing.    Eyes: Negative for pain and visual disturbance.   Respiratory: Negative for cough, choking, chest  "tightness and shortness of breath.    Cardiovascular: Negative for chest pain and leg swelling.   Gastrointestinal: Negative for abdominal distention, abdominal pain, anal bleeding, blood in stool, bowel incontinence, constipation, diarrhea, nausea, rectal pain and vomiting.   Genitourinary: Negative for bladder incontinence, difficulty urinating, dysuria, frequency, pelvic pain and urgency.   Musculoskeletal: Positive for arthralgias (right hand; most joints), back pain, gait problem, myalgias, neck pain and neck stiffness. Negative for joint swelling.   Skin: Negative.    Allergic/Immunologic: Negative for environmental allergies and food allergies.   Neurological: Positive for tingling. Negative for dizziness, seizures, syncope, weakness, light-headedness, numbness, headaches and paresthesias.   Psychiatric/Behavioral: Negative.  Negative for confusion, decreased concentration, dysphoric mood and sleep disturbance. The patient is not nervous/anxious.        Physical Exam  Vitals:    12/19/17 1321   BP: 118/72   Pulse: 78   Resp: 14   Temp: 99.6 °F (37.6 °C)    Body mass index is 27.93 kg/m².  Weight: 78.5 kg (173 lb 1 oz)   Height: 5' 6" (167.6 cm)     Physical Exam   Constitutional: He is oriented to person, place, and time. He appears well-developed and well-nourished. He is active and cooperative.  Non-toxic appearance. He does not have a sickly appearance. He does not appear ill. No distress.   HENT:   Head: Normocephalic and atraumatic.   Right Ear: Hearing, external ear and ear canal normal. No decreased hearing is noted.   Left Ear: Hearing and external ear normal. No decreased hearing is noted.   Nose: Nose normal. No rhinorrhea or nasal deformity.   Mouth/Throat: Uvula is midline and oropharynx is clear and moist. He does not have dentures. Normal dentition.   Eyes: Conjunctivae, EOM and lids are normal. Pupils are equal, round, and reactive to light. Right eye exhibits no chemosis, no discharge and no " exudate. No foreign body present in the right eye. Left eye exhibits no chemosis, no discharge and no exudate. No foreign body present in the left eye. No scleral icterus.   Neck: Normal range of motion and full passive range of motion without pain. Neck supple. No thyromegaly present.   Cardiovascular: Normal rate, regular rhythm, S1 normal, S2 normal and normal heart sounds.  Exam reveals no gallop and no friction rub.    No murmur heard.  Pulmonary/Chest: Effort normal and breath sounds normal. No accessory muscle usage. No respiratory distress. He has no decreased breath sounds. He has no wheezes. He has no rhonchi. He has no rales.   Abdominal: Soft. Normal appearance. He exhibits no distension and no mass. There is no hepatosplenomegaly. There is no tenderness. There is no rigidity, no rebound and no guarding.   Musculoskeletal:        Right hip: He exhibits tenderness and bony tenderness. He exhibits normal range of motion, normal strength, no swelling, no crepitus, no deformity and no laceration.        Left hip: He exhibits tenderness and bony tenderness. He exhibits normal range of motion, normal strength, no swelling, no crepitus, no deformity and no laceration.        Cervical back: He exhibits decreased range of motion and pain. He exhibits no tenderness, no bony tenderness, no swelling, no edema, no deformity, no laceration, no spasm and normal pulse.        Lumbar back: He exhibits decreased range of motion, bony tenderness and pain. He exhibits no tenderness, no swelling, no edema, no deformity, no laceration, no spasm and normal pulse.        Right hand: He exhibits decreased range of motion, tenderness, bony tenderness, deformity and swelling. He exhibits normal two-point discrimination, normal capillary refill and no laceration. Normal sensation noted. Decreased sensation is not present in the ulnar distribution and is not present in the medial distribution. Decreased strength noted. He exhibits  finger abduction and thumb/finger opposition. He exhibits no wrist extension trouble.        Right foot: There is decreased range of motion, tenderness and swelling. There is no bony tenderness, normal capillary refill, no crepitus, no deformity and no laceration.        Left foot: There is decreased range of motion, tenderness and swelling. There is no bony tenderness, normal capillary refill, no crepitus, no deformity and no laceration.   Neurological: He is alert and oriented to person, place, and time. He has normal strength. No cranial nerve deficit or sensory deficit. He exhibits normal muscle tone. He displays no seizure activity. Coordination and gait normal.   Skin: Skin is warm, dry and intact. No rash noted. He is not diaphoretic. No erythema. No pallor.   Covered in multiple tattoos over face, torso, and arms.   Psychiatric: He has a normal mood and affect. His speech is normal and behavior is normal. Judgment and thought content normal. His mood appears not anxious. His affect is not angry, not blunt, not labile and not inappropriate. Cognition and memory are normal. He does not exhibit a depressed mood. He is attentive.       Assessment & Plan    1. Degeneration of lumbar or lumbosacral intervertebral disc  Discussed rules regarding chronic pain management with opiate/opioid therapy.  Discussed use of alternative medications to manage pain with goal of reducing and possibly discontinue opioid therapy, and advised that in order to continue current therapy they would need to schedule appointment at least once every 90 days, as well as consent to random urine drug screening.  The legitimate medical purpose of using a controlled substance for pain management is chronic pain due to AS and disc disease.  Patient has been screened through the [facility] and is not receiving controlled substances from any other provider.  At this time, I have no suspicion of illegal activity and feel that with proper usage,  there is low risk for overdose.     - oxyCODONE-acetaminophen (PERCOCET)  mg per tablet; Take 1 tablet by mouth 3 (three) times daily as needed.  Dispense: 90 tablet; Refill: 0  - oxyCODONE-acetaminophen (PERCOCET)  mg per tablet; Take 1 tablet by mouth 3 (three) times daily as needed.  Dispense: 90 tablet; Refill: 0  - oxyCODONE-acetaminophen (PERCOCET)  mg per tablet; Take 1 tablet by mouth 3 (three) times daily as needed.  Dispense: 90 tablet; Refill: 0    2. Ankylosing spondylitis of multiple sites in spine  Managed by Rheumatology; will continue pain management as patient is not a candidate for alternative therapy due to debility.  PT not a realistic option due to severe stiffness.  - oxyCODONE-acetaminophen (PERCOCET)  mg per tablet; Take 1 tablet by mouth 3 (three) times daily as needed.  Dispense: 90 tablet; Refill: 0  - oxyCODONE-acetaminophen (PERCOCET)  mg per tablet; Take 1 tablet by mouth 3 (three) times daily as needed.  Dispense: 90 tablet; Refill: 0  - oxyCODONE-acetaminophen (PERCOCET)  mg per tablet; Take 1 tablet by mouth 3 (three) times daily as needed.  Dispense: 90 tablet; Refill: 0      Follow up documented    ACTIVE MEDICAL ISSUES:  Documented in Problem List    PAST MEDICAL HISTORY  Documented    PAST SURGICAL HISTORY:  Documented    SOCIAL HISTORY:  Documented    FAMILY HISTORY:  Documented    ALLERGIES AND MEDICATIONS: updated and reviewed.  Documented    Health Maintenance       Date Due Completion Date    TETANUS VACCINE 06/23/1988 ---    Urine Drug Screen 08/08/2016 2/8/2016    Naloxone Prescription 10/12/2018 10/12/2017    Lipid Panel 02/16/2021 2/16/2016

## 2017-12-21 ENCOUNTER — TELEPHONE (OUTPATIENT)
Dept: RHEUMATOLOGY | Facility: CLINIC | Age: 47
End: 2017-12-21

## 2017-12-21 NOTE — TELEPHONE ENCOUNTER
Left message for pt. To call clinic back to reschedule appointment because the provider will be out of the office.

## 2017-12-28 ENCOUNTER — TELEPHONE (OUTPATIENT)
Dept: RHEUMATOLOGY | Facility: CLINIC | Age: 47
End: 2017-12-28

## 2018-01-16 ENCOUNTER — HOSPITAL ENCOUNTER (EMERGENCY)
Facility: HOSPITAL | Age: 48
Discharge: HOME | End: 2018-01-16
Attending: EMERGENCY MEDICINE
Payer: MEDICARE

## 2018-01-16 VITALS
BODY MASS INDEX: 28.32 KG/M2 | TEMPERATURE: 99 F | WEIGHT: 170 LBS | HEART RATE: 78 BPM | DIASTOLIC BLOOD PRESSURE: 80 MMHG | HEIGHT: 65 IN | RESPIRATION RATE: 18 BRPM | OXYGEN SATURATION: 99 % | SYSTOLIC BLOOD PRESSURE: 134 MMHG

## 2018-01-16 DIAGNOSIS — B35.3 TINEA PEDIS, UNSPECIFIED LATERALITY: Primary | ICD-10-CM

## 2018-01-16 PROCEDURE — 99283 EMERGENCY DEPT VISIT LOW MDM: CPT

## 2018-01-16 PROCEDURE — 99283 EMERGENCY DEPT VISIT LOW MDM: CPT | Mod: ,,, | Performed by: PHYSICIAN ASSISTANT

## 2018-01-16 RX ORDER — PRENATAL VIT 91/IRON/FOLIC/DHA 28-975-200
COMBINATION PACKAGE (EA) ORAL 2 TIMES DAILY
Qty: 30 G | Refills: 0 | Status: SHIPPED | OUTPATIENT
Start: 2018-01-16 | End: 2019-03-08 | Stop reason: SDUPTHER

## 2018-01-16 NOTE — DISCHARGE INSTRUCTIONS
Keep feet dry  Apply gauze pad to the affected web spaces twice a day  Followup with our podiatry clinic, call them for an appointment

## 2018-01-16 NOTE — ED PROVIDER NOTES
Encounter Date: 1/16/2018       History     Chief Complaint   Patient presents with    Foot Injury     Pt has a cut between his 4th and 5th toe on his left foot, reports he has had it for 3 weeks and it won't heal, significant increase in pain and purulent drainage began 4 days ago. Denies fever, n/v.      47-year-old male presents to the ER for evaluation of foot discomfort bilaterally.  The patient has been seeing a doctor in [facility]  and is currently using clotrimazole cream.   He has a rash between the fourth and fifth digit of his toes bilaterally.  No trauma or injury.  Rash present for the past couple of weeks.  No other complaints. The area is wet, macerated, and erythematous. No drainage.           Review of patient's allergies indicates:   Allergen Reactions    Ampicillin Rash     Other reaction(s): Rash     Past Medical History:   Diagnosis Date    Acid reflux     Allergy     Anemia     Arthritis     Blood transfusion     Crohn's disease     Hyperlipidemia     Osteoporosis      Past Surgical History:   Procedure Laterality Date    CHOLECYSTECTOMY       Family History   Problem Relation Age of Onset    Cancer Mother      breast    Cancer Father      lung     Social History   Substance Use Topics    Smoking status: Never Smoker    Smokeless tobacco: Never Used    Alcohol use No     Review of Systems   Constitutional: Negative for fever.   HENT: Negative for sore throat.    Respiratory: Negative for shortness of breath.    Cardiovascular: Negative for chest pain.   Gastrointestinal: Negative for nausea.   Genitourinary: Negative for dysuria.   Musculoskeletal: Negative for back pain.   Skin: Positive for rash.   Neurological: Negative for weakness.   Hematological: Does not bruise/bleed easily.       Physical Exam     Initial Vitals [01/16/18 0454]   BP Pulse Resp Temp SpO2   (!) 141/93 75 14 98.5 °F (36.9 °C) 100 %      MAP       109         Physical Exam    Constitutional: Vital signs are  normal. He appears well-developed and well-nourished.   HENT:   Head: Normocephalic and atraumatic.   Eyes: Conjunctivae are normal.   Cardiovascular: Normal rate and regular rhythm.   Abdominal: Soft. Normal appearance and bowel sounds are normal.   Musculoskeletal: Normal range of motion.   Neurological: He is alert and oriented to person, place, and time.   Skin: Skin is warm and intact.   The webbing spaces between the fourth and fifth digits of the toes bilaterally are macerated, and erythematous.  There is no foul odor.  The nail beds are not affected   Psychiatric: He has a normal mood and affect. His speech is normal and behavior is normal. Cognition and memory are normal.         ED Course   Procedures  Labs Reviewed - No data to display          Medical Decision Making:   ED Management:  47-year-old male with physical exam findings consistent of tinea pedis.  Currently using clotrimazole without relief.   There is no evidence of a secondary bacterial infection  I will prescribe terbinafine, recommend follow-up with podiatry, contact information provided.  Return instructions given              Attending Attestation:     Physician Attestation Statement for NP/PA:   I discussed this assessment and plan of this patient with the NP/PA, but I did not personally examine the patient. The face to face encounter was performed by the NP/PA.                  ED Course      Clinical Impression:   The encounter diagnosis was Tinea pedis, unspecified laterality.    Disposition:   Disposition: Discharged  Condition: Stable                        Chad Andersen PA-C  01/16/18 0602       Nas Tirado MD  01/16/18 0710

## 2018-01-19 ENCOUNTER — OFFICE VISIT (OUTPATIENT)
Dept: PODIATRY | Facility: CLINIC | Age: 48
End: 2018-01-19
Payer: MEDICARE

## 2018-01-19 VITALS
SYSTOLIC BLOOD PRESSURE: 125 MMHG | HEIGHT: 65 IN | HEART RATE: 70 BPM | WEIGHT: 170 LBS | BODY MASS INDEX: 28.32 KG/M2 | DIASTOLIC BLOOD PRESSURE: 83 MMHG

## 2018-01-19 DIAGNOSIS — B35.3 TINEA PEDIS OF BOTH FEET: ICD-10-CM

## 2018-01-19 DIAGNOSIS — M79.675 CHRONIC TOE PAIN, LEFT FOOT: ICD-10-CM

## 2018-01-19 DIAGNOSIS — L08.9 LOCAL SKIN INFECTION: Primary | ICD-10-CM

## 2018-01-19 DIAGNOSIS — G89.29 CHRONIC TOE PAIN, LEFT FOOT: ICD-10-CM

## 2018-01-19 PROCEDURE — 99202 OFFICE O/P NEW SF 15 MIN: CPT | Mod: 25,S$PBB,, | Performed by: PODIATRIST

## 2018-01-19 PROCEDURE — 11000 DBRDMT ECZ/INFECTED SKIN<10%: CPT | Mod: PBBFAC | Performed by: PODIATRIST

## 2018-01-19 PROCEDURE — 99213 OFFICE O/P EST LOW 20 MIN: CPT | Mod: PBBFAC,25 | Performed by: PODIATRIST

## 2018-01-19 PROCEDURE — 99999 PR PBB SHADOW E&M-EST. PATIENT-LVL III: CPT | Mod: PBBFAC,,, | Performed by: PODIATRIST

## 2018-01-19 PROCEDURE — 11000 DBRDMT ECZ/INFECTED SKIN<10%: CPT | Mod: S$PBB,,, | Performed by: PODIATRIST

## 2018-01-19 NOTE — PROGRESS NOTES
Subjective:      Patient ID: Beau Ferraro is a 47 y.o. male.    Chief Complaint: Foot Problem    Beau is a 47 y.o. male who presents to the podiatry clinic  with complaint of  left foot pain. Onset of the symptoms was a week ago. Precipitating event: none known. Current symptoms include: swelling and green pus he squeezed out before going to the ED. Aggravating factors: walking. Symptoms have stabilized. Patient has had prior foot problems. Evaluation to date: ED sent him to us and recommended stopping creams and using a drying agent. Patients rates pain 4/10 on pain scale. He said he has been going to a podiatrist regularly for this problem who prescribed a cream, but it stays too moist and has not improved. ED notes were reviewed by me today.     Past Medical History:   Diagnosis Date    Acid reflux     Allergy     Anemia     Arthritis     Blood transfusion     Crohn's disease     Hyperlipidemia     Osteoporosis         ROS  No complaint of fever, chills, sweats. Pain in both feet at fourth webspaces, but L is much worse. No pus from the R. No diabetes. Has Crohn''s disease. Is disabled. Positive for anemia, joint pain, acid reflux.       Objective:      Physical Exam   Constitutional: He is oriented to person, place, and time. He appears well-developed and well-nourished. No distress.   Cardiovascular: Normal rate and intact distal pulses.    Musculoskeletal: He exhibits no edema or tenderness.   Mild hammertoe deformities bilaterally.    Neurological: He is alert and oriented to person, place, and time.   Skin: Skin is warm and dry. No rash noted. He is not diaphoretic. No erythema. No pallor.   Nails in good repair.    Inflamed hyperkeratotic tissue in the 4th webspace L greater than R. There was a notable hole in the tissue consistent with where the pus was exuded. No other acute signs of infection.    Psychiatric: He has a normal mood and affect. His behavior is normal.   Nursing note and vitals  reviewed.        L [address] [place] [address]      Assessment:       Encounter Diagnoses   Name Primary?    Chronic toe pain, left foot     Tinea pedis of both feet     Local skin infection          Plan:       Beau was seen today for foot problem.    Diagnoses and all orders for this visit:    Chronic toe pain, left foot    Tinea pedis of both feet    Local skin infection    - Patient was given written and verbal instructions regarding foot condition.  I counseled the patient on his conditions, their implications and medical management. Discussed padding and shoe gear and leaving the area as clean and dry as possible.     - See operative report for wound debridement.     - The wound is cleansed as much as possible and dressed with Gentian violet and Lamb's wool. The patient is alerted to watch for any signs of infection (redness, pus, pain, increased swelling or fever) and call if such occurs. Home wound care instructions are provided.     Follow-up in about 3 months (around 4/19/2018).

## 2018-01-21 NOTE — PROCEDURES
"Wound Debridement  Date/Time: 1/19/2018 11:30 AM  Performed by: JORDAN MAX  Authorized by: JORDAN MAX     Time out: Immediately prior to procedure a "time out" was called to verify the correct patient, procedure, equipment, support staff and site/side marked as required.    Consent Done?:  Yes (Verbal)    Preparation: Patient was prepped and draped in usual sterile fashion    Local anesthesia used?: No (Loss of protective Sensation)      Wound Details:    Location:  Left foot    Debridement - 1st Wound - Specific Location: 4th webspace.    Type of Debridement:  Non-excisional       Length (cm):  0       Area (sq cm):  0       Width (cm):  0       Percent Debrided (%):  100       Depth (cm):  0       Total Area Debrided (sq cm):  0    Depth of debridement:  Epidermis/Dermis    Tissue debrided:  Dermis and Epidermis    Devitalized tissue debrided:  Callus    Instruments:  Blade    Additional wounds:  1    2nd Wound Details:     Location:  Right foot    Debridement - 2nd Wound - Specific Location: R 4th webspace.    Debridement - 2nd Wound - Specific Location: R 4th webspace.       Length (cm):  0       Area (sq cm):  0       Width (cm):  0       Percent Debrided (%):  100       Depth (cm):  0       Total Area Debrided (sq cm):  0    Depth of debridement:  Epidermis/Dermis    Tissue debrided:  Epidermis and Dermis    Devitalized tissue debrided:  Callus    Instruments:  Blade    Bleeding:  None  Patient tolerance:  Patient tolerated the procedure well with no immediate complications      "

## 2018-03-08 DIAGNOSIS — M45.0 ANKYLOSING SPONDYLITIS OF MULTIPLE SITES IN SPINE: ICD-10-CM

## 2018-03-09 ENCOUNTER — TELEPHONE (OUTPATIENT)
Dept: PHARMACY | Facility: HOSPITAL | Age: 48
End: 2018-03-09

## 2018-03-09 DIAGNOSIS — M45.0 ANKYLOSING SPONDYLITIS OF MULTIPLE SITES IN SPINE: ICD-10-CM

## 2018-03-09 DIAGNOSIS — M06.4 INFLAMMATORY POLYARTHRITIS: Primary | ICD-10-CM

## 2018-03-09 NOTE — TELEPHONE ENCOUNTER
Informed patient [facility] received a prescription for Humira and we will contact their insurance company to find out if the medication is covered. We will update patient of status as more information is received. feel free to give us a call with  any questions at [phone].

## 2018-03-16 DIAGNOSIS — M45.0 ANKYLOSING SPONDYLITIS OF MULTIPLE SITES IN SPINE: ICD-10-CM

## 2018-03-16 NOTE — TELEPHONE ENCOUNTER
Needs appointment.  Humira can increase the risk of infection & it appears as though he's had some issue with this.  This is his last refill.

## 2018-03-26 ENCOUNTER — OFFICE VISIT (OUTPATIENT)
Dept: FAMILY MEDICINE | Facility: CLINIC | Age: 48
End: 2018-03-26
Payer: MEDICARE

## 2018-03-26 VITALS
OXYGEN SATURATION: 98 % | HEART RATE: 73 BPM | TEMPERATURE: 99 F | HEIGHT: 65 IN | BODY MASS INDEX: 30.81 KG/M2 | RESPIRATION RATE: 18 BRPM | DIASTOLIC BLOOD PRESSURE: 96 MMHG | WEIGHT: 184.94 LBS | SYSTOLIC BLOOD PRESSURE: 146 MMHG

## 2018-03-26 DIAGNOSIS — M51.37 DEGENERATION OF LUMBAR OR LUMBOSACRAL INTERVERTEBRAL DISC: ICD-10-CM

## 2018-03-26 DIAGNOSIS — M47.812 CERVICAL SPONDYLOSIS WITHOUT MYELOPATHY: ICD-10-CM

## 2018-03-26 DIAGNOSIS — K50.10 CROHN'S DISEASE OF LARGE INTESTINE WITHOUT COMPLICATION: ICD-10-CM

## 2018-03-26 DIAGNOSIS — E78.00 PURE HYPERCHOLESTEROLEMIA: ICD-10-CM

## 2018-03-26 DIAGNOSIS — M45.0 ANKYLOSING SPONDYLITIS OF MULTIPLE SITES IN SPINE: Primary | ICD-10-CM

## 2018-03-26 DIAGNOSIS — M06.4 INFLAMMATORY POLYARTHRITIS: ICD-10-CM

## 2018-03-26 PROCEDURE — 99999 PR PBB SHADOW E&M-EST. PATIENT-LVL III: CPT | Mod: PBBFAC,,, | Performed by: FAMILY MEDICINE

## 2018-03-26 PROCEDURE — 99215 OFFICE O/P EST HI 40 MIN: CPT | Mod: S$PBB,,, | Performed by: FAMILY MEDICINE

## 2018-03-26 PROCEDURE — 99213 OFFICE O/P EST LOW 20 MIN: CPT | Mod: PBBFAC,PO | Performed by: FAMILY MEDICINE

## 2018-03-26 RX ORDER — OXYCODONE AND ACETAMINOPHEN 10; 325 MG/1; MG/1
1 TABLET ORAL 3 TIMES DAILY PRN
Qty: 90 TABLET | Refills: 0 | Status: SHIPPED | OUTPATIENT
Start: 2018-05-24 | End: 2018-06-22 | Stop reason: SDUPTHER

## 2018-03-26 RX ORDER — OXYCODONE AND ACETAMINOPHEN 10; 325 MG/1; MG/1
1 TABLET ORAL 3 TIMES DAILY PRN
Qty: 90 TABLET | Refills: 0 | Status: SHIPPED | OUTPATIENT
Start: 2018-04-26 | End: 2018-05-26

## 2018-03-26 RX ORDER — OXYCODONE AND ACETAMINOPHEN 10; 325 MG/1; MG/1
1 TABLET ORAL 3 TIMES DAILY PRN
Qty: 90 TABLET | Refills: 0 | Status: SHIPPED | OUTPATIENT
Start: 2018-03-29 | End: 2018-04-28

## 2018-03-26 NOTE — PROGRESS NOTES
Chief Complaint   Patient presents with    Annual Exam    Back Pain     level 10 back pain       Beau Ferraro is a 47 y.o. male who presents per the Chief Complaint.  Pt is known to me and was last seen by me on 12/19/2017.  All known chronic medical issues have been documented.       Chronic Pain  Past Medical History includes: rheumatoid arthritis and chronic pain syndrome. Patient states that the pain is chronic. Beau describes pain involving the cervical spine, lumbar spine, thoracic spine, knee and hip. The onset of his pain began more than 1 year ago. The frequency of pain is described as constantly. Progression of pain since onset is waxing and waning. Patient describes pain as a 8/10. Beau is currently treating his symptoms with oxycodone/acetaminophen (Percocet) and tramadol. Patient has shown moderate improvement with current treatment.  Goals of therapy include: Improve ADL's. Beau has previously tried hydrocodone/acetaminophen (Hycet, Lorcet, Lortab, Norco,Vicodin), Nerve Block, NSAIDs, Gabapentin (Neurontin) and oxycodone/acetaminophen (Percocet) to treat his pain. Associated symptoms include: leg pain, paresis, tingling and ADLs performed poorly. Previous Imaging studies include: MRI Scan and X-Ray.  Patient has not had a drug screen. Patient does have a pain contract. Prescription Monitoring Program has been reviewed.. Patient's history of substance abuse includes: none. Patient's psychiatric disorders include: none.       ROS  Review of Systems   Constitutional: Negative.  Negative for activity change, appetite change, chills, diaphoresis, fatigue, fever, unexpected weight change and weight loss.   HENT: Negative.  Negative for congestion, ear pain, hearing loss, nosebleeds, postnasal drip, rhinorrhea, sinus pressure, sneezing, sore throat and trouble swallowing.    Eyes: Negative for pain and visual disturbance.   Respiratory: Negative for apnea, cough, choking, chest tightness and  "shortness of breath.    Cardiovascular: Negative for chest pain and leg swelling.   Gastrointestinal: Negative for abdominal distention, abdominal pain, anal bleeding, blood in stool, bowel incontinence, constipation, diarrhea, nausea, rectal pain and vomiting.   Genitourinary: Negative for bladder incontinence, difficulty urinating, dysuria, flank pain, frequency, penile pain, penile swelling, scrotal swelling, testicular pain and urgency.   Musculoskeletal: Positive for arthralgias (right hand; most joints), back pain, gait problem, myalgias, neck pain and neck stiffness. Negative for joint swelling.   Skin: Negative.    Allergic/Immunologic: Negative for environmental allergies, food allergies and immunocompromised state.   Neurological: Positive for tingling and light-headedness (occasional). Negative for dizziness, seizures, syncope, weakness, numbness, headaches and paresthesias.   Psychiatric/Behavioral: Negative.  Negative for confusion, decreased concentration, dysphoric mood and sleep disturbance. The patient is not nervous/anxious.        Physical Exam  Vitals:    03/26/18 1325   BP: (!) 146/96   Pulse: 73   Resp: 18   Temp: 98.7 °F (37.1 °C)    Body mass index is 30.78 kg/m².  Weight: 83.9 kg (184 lb 15.5 oz)   Height: 5' 5" (165.1 cm)     Physical Exam   Constitutional: He is oriented to person, place, and time. He appears well-developed and well-nourished. He is active and cooperative.  Non-toxic appearance. He does not have a sickly appearance. He does not appear ill. No distress.   HENT:   Head: Normocephalic and atraumatic.   Right Ear: Hearing, tympanic membrane, external ear and ear canal normal. No tenderness. No foreign bodies. No mastoid tenderness. Tympanic membrane is not injected, not scarred, not perforated, not erythematous, not retracted and not bulging. No hemotympanum. No decreased hearing is noted.   Left Ear: Hearing, tympanic membrane, external ear and ear canal normal. No " tenderness. No foreign bodies. No mastoid tenderness. Tympanic membrane is not injected, not scarred, not perforated, not erythematous, not retracted and not bulging. No hemotympanum. No decreased hearing is noted.   Nose: Nose normal. No rhinorrhea, sinus tenderness, nasal deformity or septal deviation. No epistaxis.  No foreign bodies.   Mouth/Throat: Uvula is midline, oropharynx is clear and moist and mucous membranes are normal. He does not have dentures. Normal dentition. No uvula swelling or dental caries. No oropharyngeal exudate, posterior oropharyngeal edema, posterior oropharyngeal erythema or tonsillar abscesses.   Eyes: Conjunctivae, EOM and lids are normal. Pupils are equal, round, and reactive to light. Right eye exhibits no chemosis, no discharge, no exudate and no hordeolum. No foreign body present in the right eye. Left eye exhibits no chemosis, no discharge, no exudate and no hordeolum. No foreign body present in the left eye. No scleral icterus.   Neck: Normal range of motion and full passive range of motion without pain. Neck supple. No thyroid mass and no thyromegaly present.   Cardiovascular: Normal rate, regular rhythm, S1 normal, S2 normal and normal heart sounds.  Exam reveals no gallop and no friction rub.    No murmur heard.  Pulmonary/Chest: Effort normal and breath sounds normal. No accessory muscle usage. No respiratory distress. He has no decreased breath sounds. He has no wheezes. He has no rhonchi. He has no rales.   Abdominal: Soft. Normal appearance and bowel sounds are normal. He exhibits no distension, no ascites and no mass. There is no hepatosplenomegaly. There is no tenderness. There is no rigidity, no rebound and no guarding. No hernia.   Musculoskeletal:        Right hip: He exhibits tenderness and bony tenderness. He exhibits normal range of motion, normal strength, no swelling, no crepitus, no deformity and no laceration.        Left hip: He exhibits tenderness and bony  tenderness. He exhibits normal range of motion, normal strength, no swelling, no crepitus, no deformity and no laceration.        Cervical back: He exhibits decreased range of motion and pain. He exhibits no tenderness, no bony tenderness, no swelling, no edema, no deformity, no laceration, no spasm and normal pulse.        Lumbar back: He exhibits decreased range of motion, bony tenderness and pain. He exhibits no tenderness, no swelling, no edema, no deformity, no laceration, no spasm and normal pulse.        Right hand: He exhibits decreased range of motion, tenderness, bony tenderness, deformity and swelling. He exhibits normal two-point discrimination, normal capillary refill and no laceration. Normal sensation noted. Decreased sensation is not present in the ulnar distribution and is not present in the medial distribution. Decreased strength noted. He exhibits finger abduction and thumb/finger opposition. He exhibits no wrist extension trouble.        Right foot: There is decreased range of motion, tenderness and swelling. There is no bony tenderness, normal capillary refill, no crepitus, no deformity and no laceration.        Left foot: There is decreased range of motion, tenderness and swelling. There is no bony tenderness, normal capillary refill, no crepitus, no deformity and no laceration.   Lymphadenopathy:        Head (right side): No submental, no submandibular, no preauricular and no posterior auricular adenopathy present.        Head (left side): No submental, no submandibular, no preauricular and no posterior auricular adenopathy present.     He has no cervical adenopathy.   Neurological: He is alert and oriented to person, place, and time. He has normal strength. He displays no atrophy and no tremor. No cranial nerve deficit or sensory deficit. He exhibits normal muscle tone. He displays no seizure activity. Coordination and gait normal.   Skin: Skin is warm, dry and intact. No rash noted. He is  not diaphoretic. No erythema. No pallor.   Covered in multiple tattoos over face, torso, and arms.   Psychiatric: He has a normal mood and affect. His speech is normal and behavior is normal. Judgment and thought content normal. His mood appears not anxious. His affect is not angry, not blunt, not labile and not inappropriate. Cognition and memory are normal. He does not exhibit a depressed mood. He is attentive.   Vitals reviewed.      Assessment & Plan    1. Ankylosing spondylitis of multiple sites in spine  Discussed rules regarding chronic pain management with opiate/opioid therapy.  Discussed use of alternative medications to manage pain with goal of reducing and possibly discontinue opioid therapy, and advised that in order to continue current therapy they would need to schedule appointment at least once every 90 days, as well as consent to random urine drug screening.  The legitimate medical purpose of using a controlled substance for pain management is AS in multiple locations.  Patient has been screened through the [facility] and is not receiving controlled substances from any other provider.  At this time, I have no suspicion of illegal activity and feel that with proper usage, there is low risk for overdose.     - oxyCODONE-acetaminophen (PERCOCET)  mg per tablet; Take 1 tablet by mouth 3 (three) times daily as needed.  Dispense: 90 tablet; Refill: 0  - oxyCODONE-acetaminophen (PERCOCET)  mg per tablet; Take 1 tablet by mouth 3 (three) times daily as needed.  Dispense: 90 tablet; Refill: 0  - oxyCODONE-acetaminophen (PERCOCET)  mg per tablet; Take 1 tablet by mouth 3 (three) times daily as needed.  Dispense: 90 tablet; Refill: 0    2. Inflammatory polyarthritis  Managed by Rheumatology; Screening test will be ordered and once results available patient will be notified of results and managed accordingly.    - CBC auto differential; Future  - Comprehensive metabolic panel; Future    3.  Cervical spondylosis without myelopathy  Stable; no therapeutic changes at this time.  Will continue pain management.    4. Crohn's disease of large intestine without complication  Stable; no therapeutic changes at this time.  No acute flare ups over the last several years.    5. Degeneration of lumbar or lumbosacral intervertebral disc  Will continue pain management.  - oxyCODONE-acetaminophen (PERCOCET)  mg per tablet; Take 1 tablet by mouth 3 (three) times daily as needed.  Dispense: 90 tablet; Refill: 0  - oxyCODONE-acetaminophen (PERCOCET)  mg per tablet; Take 1 tablet by mouth 3 (three) times daily as needed.  Dispense: 90 tablet; Refill: 0  - oxyCODONE-acetaminophen (PERCOCET)  mg per tablet; Take 1 tablet by mouth 3 (three) times daily as needed.  Dispense: 90 tablet; Refill: 0    6. Pure hypercholesterolemia  We discussed ways to manage cholesterol levels, including increasing whole grain foods and decreasing fried and fatty foods.  I also recommended OTC Omega 3 and Omega 6 supplements to improve overall cholesterol levels.  Will continue current therapy at this visit; patient is due for screening blood test at this time.   - Lipid panel; Future      Follow up documented    ACTIVE MEDICAL ISSUES:  Documented in Problem List    PAST MEDICAL HISTORY  Documented    PAST SURGICAL HISTORY:  Documented    SOCIAL HISTORY:  Documented    FAMILY HISTORY:  Documented    ALLERGIES AND MEDICATIONS: updated and reviewed.  Documented    Health Maintenance       Date Due Completion Date    TETANUS VACCINE 06/23/1988 ---    Urine Drug Screen 08/08/2016 2/8/2016    Naloxone Prescription 10/12/2018 10/12/2017    Lipid Panel 02/16/2021 2/16/2016

## 2018-04-19 DIAGNOSIS — M45.0 ANKYLOSING SPONDYLITIS OF MULTIPLE SITES IN SPINE: ICD-10-CM

## 2018-06-14 ENCOUNTER — OFFICE VISIT (OUTPATIENT)
Dept: RHEUMATOLOGY | Facility: CLINIC | Age: 48
End: 2018-06-14
Payer: MEDICARE

## 2018-06-14 VITALS
HEART RATE: 77 BPM | DIASTOLIC BLOOD PRESSURE: 83 MMHG | WEIGHT: 180.31 LBS | BODY MASS INDEX: 28.98 KG/M2 | SYSTOLIC BLOOD PRESSURE: 120 MMHG | HEIGHT: 66 IN

## 2018-06-14 DIAGNOSIS — M47.899 HLA-B27 SPONDYLOARTHROPATHY: Primary | ICD-10-CM

## 2018-06-14 DIAGNOSIS — M45.0 ANKYLOSING SPONDYLITIS OF MULTIPLE SITES IN SPINE: ICD-10-CM

## 2018-06-14 DIAGNOSIS — H20.021 IRITIS, RECURRENT, RIGHT: ICD-10-CM

## 2018-06-14 DIAGNOSIS — Z79.60 LONG-TERM USE OF IMMUNOSUPPRESSANT MEDICATION: ICD-10-CM

## 2018-06-14 DIAGNOSIS — E55.9 VITAMIN D INSUFFICIENCY: ICD-10-CM

## 2018-06-14 PROCEDURE — 99213 OFFICE O/P EST LOW 20 MIN: CPT | Mod: PBBFAC | Performed by: INTERNAL MEDICINE

## 2018-06-14 PROCEDURE — 99214 OFFICE O/P EST MOD 30 MIN: CPT | Mod: S$PBB,,, | Performed by: INTERNAL MEDICINE

## 2018-06-14 PROCEDURE — 99999 PR PBB SHADOW E&M-EST. PATIENT-LVL III: CPT | Mod: PBBFAC,,, | Performed by: INTERNAL MEDICINE

## 2018-06-14 ASSESSMENT — ROUTINE ASSESSMENT OF PATIENT INDEX DATA (RAPID3)
AM STIFFNESS SCORE: 1, YES
TOTAL RAPID3 SCORE: 8.55
WHEN YOU AWAKENED IN THE MORNING OVER THE LAST WEEK, PLEASE INDICATE THE AMOUNT OF TIME IT TAKES UNTIL YOU ARE AS LIMBER AS YOU WILL BE FOR THE DAY: 30 MINUTES
FATIGUE SCORE: 8
PATIENT GLOBAL ASSESSMENT SCORE: 9.5
MDHAQ FUNCTION SCORE: 2
PSYCHOLOGICAL DISTRESS SCORE: 3.3
PAIN SCORE: 9.5
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
## 2018-06-20 ENCOUNTER — LAB VISIT (OUTPATIENT)
Dept: LAB | Facility: HOSPITAL | Age: 48
End: 2018-06-20
Attending: FAMILY MEDICINE
Payer: MEDICARE

## 2018-06-20 DIAGNOSIS — Z79.60 LONG-TERM USE OF IMMUNOSUPPRESSANT MEDICATION: ICD-10-CM

## 2018-06-20 DIAGNOSIS — E55.9 VITAMIN D INSUFFICIENCY: ICD-10-CM

## 2018-06-20 DIAGNOSIS — M06.4 INFLAMMATORY POLYARTHRITIS: ICD-10-CM

## 2018-06-20 DIAGNOSIS — M45.0 ANKYLOSING SPONDYLITIS OF MULTIPLE SITES IN SPINE: ICD-10-CM

## 2018-06-20 DIAGNOSIS — E78.00 PURE HYPERCHOLESTEROLEMIA: ICD-10-CM

## 2018-06-20 LAB
25(OH)D3+25(OH)D2 SERPL-MCNC: 22 NG/ML
ALBUMIN SERPL BCP-MCNC: 3.7 G/DL
ALP SERPL-CCNC: 78 U/L
ALT SERPL W/O P-5'-P-CCNC: 27 U/L
ANION GAP SERPL CALC-SCNC: 6 MMOL/L
AST SERPL-CCNC: 26 U/L
BASOPHILS # BLD AUTO: 0.04 K/UL
BASOPHILS NFR BLD: 0.7 %
BILIRUB SERPL-MCNC: 0.6 MG/DL
BUN SERPL-MCNC: 20 MG/DL
CALCIUM SERPL-MCNC: 9.1 MG/DL
CHLORIDE SERPL-SCNC: 104 MMOL/L
CHOLEST SERPL-MCNC: 212 MG/DL
CHOLEST/HDLC SERPL: 4.2 {RATIO}
CO2 SERPL-SCNC: 28 MMOL/L
CREAT SERPL-MCNC: 1.3 MG/DL
CRP SERPL-MCNC: 1.3 MG/L
DIFFERENTIAL METHOD: ABNORMAL
EOSINOPHIL # BLD AUTO: 0.1 K/UL
EOSINOPHIL NFR BLD: 2 %
ERYTHROCYTE [DISTWIDTH] IN BLOOD BY AUTOMATED COUNT: 15.1 %
ERYTHROCYTE [SEDIMENTATION RATE] IN BLOOD BY WESTERGREN METHOD: 6 MM/HR
EST. GFR  (AFRICAN AMERICAN): >60 ML/MIN/1.73 M^2
EST. GFR  (NON AFRICAN AMERICAN): >60 ML/MIN/1.73 M^2
GLUCOSE SERPL-MCNC: 94 MG/DL
HCT VFR BLD AUTO: 38.2 %
HDLC SERPL-MCNC: 51 MG/DL
HDLC SERPL: 24.1 %
HGB BLD-MCNC: 11.8 G/DL
IMM GRANULOCYTES # BLD AUTO: 0.01 K/UL
IMM GRANULOCYTES NFR BLD AUTO: 0.2 %
LDLC SERPL CALC-MCNC: 146.6 MG/DL
LYMPHOCYTES # BLD AUTO: 3.5 K/UL
LYMPHOCYTES NFR BLD: 64.3 %
MCH RBC QN AUTO: 22.5 PG
MCHC RBC AUTO-ENTMCNC: 30.9 G/DL
MCV RBC AUTO: 73 FL
MONOCYTES # BLD AUTO: 0.5 K/UL
MONOCYTES NFR BLD: 8.8 %
NEUTROPHILS # BLD AUTO: 1.3 K/UL
NEUTROPHILS NFR BLD: 24 %
NONHDLC SERPL-MCNC: 161 MG/DL
NRBC BLD-RTO: 0 /100 WBC
PLATELET # BLD AUTO: 227 K/UL
PMV BLD AUTO: 10.6 FL
POTASSIUM SERPL-SCNC: 4.1 MMOL/L
PROT SERPL-MCNC: 7 G/DL
RBC # BLD AUTO: 5.24 M/UL
SODIUM SERPL-SCNC: 138 MMOL/L
TRIGL SERPL-MCNC: 72 MG/DL
WBC # BLD AUTO: 5.43 K/UL

## 2018-06-20 PROCEDURE — 86140 C-REACTIVE PROTEIN: CPT

## 2018-06-20 PROCEDURE — 36415 COLL VENOUS BLD VENIPUNCTURE: CPT | Mod: PO

## 2018-06-20 PROCEDURE — 85651 RBC SED RATE NONAUTOMATED: CPT

## 2018-06-20 PROCEDURE — 85025 COMPLETE CBC W/AUTO DIFF WBC: CPT

## 2018-06-20 PROCEDURE — 82306 VITAMIN D 25 HYDROXY: CPT

## 2018-06-20 PROCEDURE — 80053 COMPREHEN METABOLIC PANEL: CPT

## 2018-06-20 PROCEDURE — 80061 LIPID PANEL: CPT

## 2018-06-21 ENCOUNTER — TELEPHONE (OUTPATIENT)
Dept: RHEUMATOLOGY | Facility: CLINIC | Age: 48
End: 2018-06-21

## 2018-06-21 NOTE — TELEPHONE ENCOUNTER
Labs show low vitamin-D.  Patient is on vitamin-D.  Dr. Art instructed this patient to take 5000 units of vitamin D3 two times daily and repeat level in 3 months on June 14.

## 2018-06-22 ENCOUNTER — HOSPITAL ENCOUNTER (OUTPATIENT)
Dept: RADIOLOGY | Facility: HOSPITAL | Age: 48
Discharge: HOME | End: 2018-06-22
Attending: FAMILY MEDICINE
Payer: MEDICARE

## 2018-06-22 ENCOUNTER — OFFICE VISIT (OUTPATIENT)
Dept: FAMILY MEDICINE | Facility: CLINIC | Age: 48
End: 2018-06-22
Payer: MEDICARE

## 2018-06-22 VITALS
SYSTOLIC BLOOD PRESSURE: 132 MMHG | HEIGHT: 66 IN | DIASTOLIC BLOOD PRESSURE: 102 MMHG | TEMPERATURE: 99 F | WEIGHT: 179 LBS | BODY MASS INDEX: 28.77 KG/M2 | HEART RATE: 82 BPM | OXYGEN SATURATION: 99 % | RESPIRATION RATE: 18 BRPM

## 2018-06-22 DIAGNOSIS — M51.37 DEGENERATION OF LUMBAR OR LUMBOSACRAL INTERVERTEBRAL DISC: ICD-10-CM

## 2018-06-22 DIAGNOSIS — M45.0 ANKYLOSING SPONDYLITIS OF MULTIPLE SITES IN SPINE: ICD-10-CM

## 2018-06-22 PROCEDURE — 99213 OFFICE O/P EST LOW 20 MIN: CPT | Mod: PBBFAC,25,PO | Performed by: FAMILY MEDICINE

## 2018-06-22 PROCEDURE — 99999 PR PBB SHADOW E&M-EST. PATIENT-LVL III: CPT | Mod: PBBFAC,,, | Performed by: FAMILY MEDICINE

## 2018-06-22 PROCEDURE — 72100 X-RAY EXAM L-S SPINE 2/3 VWS: CPT | Mod: TC,FY,PO

## 2018-06-22 PROCEDURE — 72100 X-RAY EXAM L-S SPINE 2/3 VWS: CPT | Mod: 26,,, | Performed by: RADIOLOGY

## 2018-06-22 PROCEDURE — 99215 OFFICE O/P EST HI 40 MIN: CPT | Mod: S$PBB,,, | Performed by: FAMILY MEDICINE

## 2018-06-22 RX ORDER — OXYCODONE AND ACETAMINOPHEN 10; 325 MG/1; MG/1
1 TABLET ORAL 3 TIMES DAILY PRN
Qty: 90 TABLET | Refills: 0 | Status: SHIPPED | OUTPATIENT
Start: 2018-06-22 | End: 2018-07-22

## 2018-06-22 RX ORDER — OXYCODONE AND ACETAMINOPHEN 10; 325 MG/1; MG/1
1 TABLET ORAL 3 TIMES DAILY PRN
Qty: 90 TABLET | Refills: 0 | Status: SHIPPED | OUTPATIENT
Start: 2018-07-20 | End: 2018-08-19

## 2018-06-22 RX ORDER — OXYCODONE AND ACETAMINOPHEN 10; 325 MG/1; MG/1
1 TABLET ORAL 3 TIMES DAILY PRN
Qty: 90 TABLET | Refills: 0 | Status: SHIPPED | OUTPATIENT
Start: 2018-08-17 | End: 2018-09-21 | Stop reason: SDUPTHER

## 2018-06-22 NOTE — PROGRESS NOTES
Chief Complaint   Patient presents with    Follow-up     3 month    Arthritis    Neck Pain    Medication Refill    Back Pain       Beau Ferraro is a 47 y.o. male who presents per the Chief Complaint.  Pt is known to me and was last seen by me on 3/26/2018.  All known chronic medical issues have been documented.       Arthritis   Presents for follow-up visit. He complains of pain and stiffness. He reports no joint swelling or joint warmth. The symptoms have been stable. Affected locations include the neck, right hip, left hip, right knee, left knee, right ankle, left ankle, right foot, left foot, right wrist, left wrist, right elbow, left elbow, right shoulder and left shoulder. His pain is at a severity of 9/10. Associated symptoms include dry eyes, pain at night, pain while resting and uveitis. Pertinent negatives include no diarrhea, dry mouth, dysuria, fatigue, fever, rash, Raynaud's syndrome or weight loss. Side effects of treatment include joint pain and limb pain.   Chronic Pain  Past Medical History includes: rheumatoid arthritis and chronic pain syndrome. Patient states that the pain is chronic. Beau describes pain involving the cervical spine, lumbar spine, thoracic spine, knee and hip. The onset of his pain began more than 1 year ago. The frequency of pain is described as constantly. Progression of pain since onset is waxing and waning. Patient describes pain as a 8/10. Beau is currently treating his symptoms with oxycodone/acetaminophen (Percocet) and tramadol. Patient has shown moderate improvement with current treatment.  Goals of therapy include: Improve ADL's. Beau has previously tried hydrocodone/acetaminophen (Hycet, Lorcet, Lortab, Norco,Vicodin), Nerve Block, NSAIDs, Gabapentin (Neurontin) and oxycodone/acetaminophen (Percocet) to treat his pain. Associated symptoms include: leg pain, paresis, tingling and ADLs performed poorly. Previous Imaging studies include: MRI Scan and  X-Ray.  Patient has not had a drug screen. Patient does have a pain contract. Prescription Monitoring Program has been reviewed.. Patient's history of substance abuse includes: none. Patient's psychiatric disorders include: none.       ROS  Review of Systems   Constitutional: Negative.  Negative for activity change, appetite change, chills, diaphoresis, fatigue, fever, unexpected weight change and weight loss.   HENT: Negative.  Negative for congestion, ear pain, hearing loss, nosebleeds, postnasal drip, rhinorrhea, sinus pressure, sneezing, sore throat and trouble swallowing.    Eyes: Negative for pain and visual disturbance.   Respiratory: Negative for apnea, cough, choking, chest tightness and shortness of breath.    Cardiovascular: Negative for chest pain and leg swelling.   Gastrointestinal: Negative for abdominal distention, abdominal pain, anal bleeding, blood in stool, bowel incontinence, constipation, diarrhea, nausea, rectal pain and vomiting.   Genitourinary: Negative for bladder incontinence, difficulty urinating, dysuria, flank pain, frequency, penile pain, penile swelling, scrotal swelling, testicular pain and urgency.   Musculoskeletal: Positive for arthralgias (right hand; most joints), arthritis, back pain, gait problem, myalgias, neck pain, neck stiffness and stiffness. Negative for joint swelling.   Skin: Negative.  Negative for rash.   Allergic/Immunologic: Negative for environmental allergies, food allergies and immunocompromised state.   Neurological: Positive for tingling and light-headedness (occasional). Negative for dizziness, seizures, syncope, weakness, numbness, headaches and paresthesias.   Psychiatric/Behavioral: Negative.  Negative for confusion, decreased concentration, dysphoric mood and sleep disturbance. The patient is not nervous/anxious.        Physical Exam  Vitals:    06/22/18 1051   BP: (!) 132/102   Pulse: 82   Resp: 18   Temp: 98.6 °F (37 °C)    Body mass index is 28.89  "kg/m².  Weight: 81.2 kg (179 lb 0.2 oz)   Height: 5' 6" (167.6 cm)     Physical Exam   Constitutional: He is oriented to person, place, and time. He appears well-developed and well-nourished. He is active and cooperative.  Non-toxic appearance. He does not have a sickly appearance. He does not appear ill. No distress.   HENT:   Head: Normocephalic and atraumatic.   Right Ear: Hearing, tympanic membrane, external ear and ear canal normal. No tenderness. No foreign bodies. No mastoid tenderness. Tympanic membrane is not injected, not scarred, not perforated, not erythematous, not retracted and not bulging. No hemotympanum. No decreased hearing is noted.   Left Ear: Hearing, tympanic membrane, external ear and ear canal normal. No tenderness. No foreign bodies. No mastoid tenderness. Tympanic membrane is not injected, not scarred, not perforated, not erythematous, not retracted and not bulging. No hemotympanum. No decreased hearing is noted.   Nose: Nose normal. No rhinorrhea, sinus tenderness, nasal deformity or septal deviation. No epistaxis.  No foreign bodies.   Mouth/Throat: Uvula is midline, oropharynx is clear and moist and mucous membranes are normal. He does not have dentures. Normal dentition. No uvula swelling or dental caries. No oropharyngeal exudate, posterior oropharyngeal edema, posterior oropharyngeal erythema or tonsillar abscesses.   Eyes: Conjunctivae, EOM and lids are normal. Pupils are equal, round, and reactive to light. Right eye exhibits no chemosis, no discharge, no exudate and no hordeolum. No foreign body present in the right eye. Left eye exhibits no chemosis, no discharge, no exudate and no hordeolum. No foreign body present in the left eye. No scleral icterus.   Neck: Normal range of motion and full passive range of motion without pain. Neck supple. No thyroid mass and no thyromegaly present.   Cardiovascular: Normal rate, regular rhythm, S1 normal, S2 normal and normal heart sounds.  " Exam reveals no gallop and no friction rub.    No murmur heard.  Pulmonary/Chest: Effort normal and breath sounds normal. No accessory muscle usage. No respiratory distress. He has no decreased breath sounds. He has no wheezes. He has no rhonchi. He has no rales.   Abdominal: Soft. Normal appearance and bowel sounds are normal. He exhibits no distension, no ascites and no mass. There is no hepatosplenomegaly. There is no tenderness. There is no rigidity, no rebound and no guarding. No hernia.   Musculoskeletal:        Right hip: He exhibits tenderness and bony tenderness. He exhibits normal range of motion, normal strength, no swelling, no crepitus, no deformity and no laceration.        Left hip: He exhibits tenderness and bony tenderness. He exhibits normal range of motion, normal strength, no swelling, no crepitus, no deformity and no laceration.        Cervical back: He exhibits decreased range of motion and pain. He exhibits no tenderness, no bony tenderness, no swelling, no edema, no deformity, no laceration, no spasm and normal pulse.        Lumbar back: He exhibits decreased range of motion, bony tenderness and pain. He exhibits no tenderness, no swelling, no edema, no deformity, no laceration, no spasm and normal pulse.        Right hand: He exhibits decreased range of motion, tenderness, bony tenderness, deformity and swelling. He exhibits normal two-point discrimination, normal capillary refill and no laceration. Normal sensation noted. Decreased sensation is not present in the ulnar distribution and is not present in the medial distribution. Decreased strength noted. He exhibits finger abduction and thumb/finger opposition. He exhibits no wrist extension trouble.        Right foot: There is decreased range of motion, tenderness and swelling. There is no bony tenderness, normal capillary refill, no crepitus, no deformity and no laceration.        Left foot: There is decreased range of motion, tenderness  and swelling. There is no bony tenderness, normal capillary refill, no crepitus, no deformity and no laceration.   Lymphadenopathy:        Head (right side): No submental, no submandibular, no preauricular and no posterior auricular adenopathy present.        Head (left side): No submental, no submandibular, no preauricular and no posterior auricular adenopathy present.     He has no cervical adenopathy.   Neurological: He is alert and oriented to person, place, and time. He has normal strength. He displays no atrophy and no tremor. No cranial nerve deficit or sensory deficit. He exhibits normal muscle tone. He displays no seizure activity. Coordination and gait normal.   Skin: Skin is warm, dry and intact. No rash noted. He is not diaphoretic. No erythema. No pallor.   Covered in multiple tattoos over face, torso, and arms.   Psychiatric: He has a normal mood and affect. His speech is normal and behavior is normal. Judgment and thought content normal. His mood appears not anxious. His affect is not angry, not blunt, not labile and not inappropriate. Cognition and memory are normal. He does not exhibit a depressed mood. He is attentive.   Vitals reviewed.      Assessment & Plan    1. Ankylosing spondylitis of multiple sites in spine  Managed by Rheumatology; patient will continue Humira injections and Physical Therapy.  Discussed rules regarding chronic pain management with opiate/opioid therapy.  Discussed use of alternative medications to manage pain with goal of reducing and possibly discontinue opioid therapy, and advised that in order to continue current therapy they would need to schedule appointment at least once every 90 days, as well as consent to random urine drug screening.  The legitimate medical purpose of using a controlled substance for pain management is AS and chronic pain.  Patient has been screened through the [facility] and is not receiving controlled substances from any other provider.  At  this time, I have no suspicion of illegal activity and feel that with proper usage, there is low risk for overdose.     - oxyCODONE-acetaminophen (PERCOCET)  mg per tablet; Take 1 tablet by mouth 3 (three) times daily as needed.  Dispense: 90 tablet; Refill: 0  - oxyCODONE-acetaminophen (PERCOCET)  mg per tablet; Take 1 tablet by mouth 3 (three) times daily as needed.  Dispense: 90 tablet; Refill: 0  - oxyCODONE-acetaminophen (PERCOCET)  mg per tablet; Take 1 tablet by mouth 3 (three) times daily as needed.  Dispense: 90 tablet; Refill: 0  - X-Ray Lumbar Spine Ap And Lateral; Future    2. Degeneration of lumbar or lumbosacral intervertebral disc  Will order imaging to further evaluate and manage accordingly.  The current medical regimen is effective at this time and no changes to present plan or medications will be made at this visit.     - oxyCODONE-acetaminophen (PERCOCET)  mg per tablet; Take 1 tablet by mouth 3 (three) times daily as needed.  Dispense: 90 tablet; Refill: 0  - oxyCODONE-acetaminophen (PERCOCET)  mg per tablet; Take 1 tablet by mouth 3 (three) times daily as needed.  Dispense: 90 tablet; Refill: 0  - oxyCODONE-acetaminophen (PERCOCET)  mg per tablet; Take 1 tablet by mouth 3 (three) times daily as needed.  Dispense: 90 tablet; Refill: 0  - X-Ray Lumbar Spine Ap And Lateral; Future      Follow up documented    ACTIVE MEDICAL ISSUES:  Documented in Problem List    PAST MEDICAL HISTORY  Documented    PAST SURGICAL HISTORY:  Documented    SOCIAL HISTORY:  Documented    FAMILY HISTORY:  Documented    ALLERGIES AND MEDICATIONS: updated and reviewed.  Documented    Health Maintenance       Date Due Completion Date    TETANUS VACCINE 06/23/1988 ---    Urine Drug Screen 08/08/2016 2/8/2016    Influenza Vaccine 08/01/2018 10/12/2017 (Declined)    Override on 10/12/2017: Declined    Naloxone Prescription 10/12/2018 10/12/2017    Lipid Panel 06/20/2023 6/20/2018

## 2018-09-21 ENCOUNTER — OFFICE VISIT (OUTPATIENT)
Dept: FAMILY MEDICINE | Facility: CLINIC | Age: 48
End: 2018-09-21
Payer: MEDICARE

## 2018-09-21 VITALS
BODY MASS INDEX: 29.05 KG/M2 | DIASTOLIC BLOOD PRESSURE: 100 MMHG | HEART RATE: 68 BPM | HEIGHT: 66 IN | OXYGEN SATURATION: 98 % | SYSTOLIC BLOOD PRESSURE: 132 MMHG | WEIGHT: 180.75 LBS | TEMPERATURE: 98 F | RESPIRATION RATE: 17 BRPM

## 2018-09-21 DIAGNOSIS — M45.0 ANKYLOSING SPONDYLITIS OF MULTIPLE SITES IN SPINE: Primary | ICD-10-CM

## 2018-09-21 DIAGNOSIS — M51.37 DEGENERATION OF LUMBAR OR LUMBOSACRAL INTERVERTEBRAL DISC: ICD-10-CM

## 2018-09-21 DIAGNOSIS — Z23 NEED FOR INFLUENZA VACCINATION: ICD-10-CM

## 2018-09-21 DIAGNOSIS — I10 ESSENTIAL HYPERTENSION: ICD-10-CM

## 2018-09-21 PROCEDURE — 99213 OFFICE O/P EST LOW 20 MIN: CPT | Mod: PBBFAC,PO | Performed by: FAMILY MEDICINE

## 2018-09-21 PROCEDURE — 99999 PR PBB SHADOW E&M-EST. PATIENT-LVL III: CPT | Mod: PBBFAC,,, | Performed by: FAMILY MEDICINE

## 2018-09-21 PROCEDURE — 99215 OFFICE O/P EST HI 40 MIN: CPT | Mod: S$PBB,,, | Performed by: FAMILY MEDICINE

## 2018-09-21 PROCEDURE — 90686 IIV4 VACC NO PRSV 0.5 ML IM: CPT | Mod: PBBFAC,PO

## 2018-09-21 RX ORDER — PRAVASTATIN SODIUM 10 MG/1
10 TABLET ORAL
COMMUNITY
End: 2018-12-12 | Stop reason: CLARIF

## 2018-09-21 RX ORDER — OXYCODONE AND ACETAMINOPHEN 10; 325 MG/1; MG/1
1 TABLET ORAL 3 TIMES DAILY PRN
Qty: 90 TABLET | Refills: 0 | Status: SHIPPED | OUTPATIENT
Start: 2018-09-21 | End: 2018-10-21

## 2018-09-21 RX ORDER — CLONAZEPAM 2 MG/1
TABLET ORAL
Refills: 3 | COMMUNITY
Start: 2018-09-05 | End: 2019-09-03 | Stop reason: DRUGHIGH

## 2018-09-21 RX ORDER — OXYCODONE AND ACETAMINOPHEN 10; 325 MG/1; MG/1
1 TABLET ORAL 3 TIMES DAILY PRN
Qty: 90 TABLET | Refills: 0 | Status: SHIPPED | OUTPATIENT
Start: 2018-11-16 | End: 2018-12-21 | Stop reason: SDUPTHER

## 2018-09-21 RX ORDER — OXYCODONE AND ACETAMINOPHEN 10; 325 MG/1; MG/1
1 TABLET ORAL 3 TIMES DAILY PRN
Qty: 90 TABLET | Refills: 0 | Status: SHIPPED | OUTPATIENT
Start: 2018-10-19 | End: 2018-11-18

## 2018-09-21 RX ORDER — AMLODIPINE BESYLATE 5 MG/1
5 TABLET ORAL DAILY
Qty: 30 TABLET | Refills: 5 | Status: SHIPPED | OUTPATIENT
Start: 2018-09-21 | End: 2018-10-21

## 2018-09-21 RX ORDER — CLONAZEPAM 1 MG/1
TABLET ORAL
Refills: 3 | COMMUNITY
Start: 2018-06-19 | End: 2020-07-27 | Stop reason: DRUGHIGH

## 2018-09-21 NOTE — PROGRESS NOTES
Flu vaccine administered IM to right deltoid.VIS form given. Patient tolerated well.No adverse reaction noted.

## 2018-09-21 NOTE — PROGRESS NOTES
Chief Complaint   Patient presents with    Hypertension     3 months follow up     Medication Refill    Back Pain       Beau Ferraro is a 48 y.o. male who presents per the Chief Complaint.  Pt is known to me and was last seen by me on 6/22/2018.  All known chronic medical issues have been documented.       Hypertension   This is a new problem. The current episode started more than 1 month ago. The problem is unchanged. The problem is uncontrolled. Pertinent negatives include no anxiety, blurred vision, chest pain, headaches, malaise/fatigue, neck pain, orthopnea, palpitations, peripheral edema, PND, shortness of breath or sweats. There are no associated agents to hypertension. Risk factors for coronary artery disease include male gender and sedentary lifestyle. Past treatments include nothing. Compliance problems include exercise and psychosocial issues.  There is no history of angina, kidney disease, CAD/MI, CVA, heart failure, left ventricular hypertrophy, PVD or retinopathy. There is no history of chronic renal disease, coarctation of the aorta, hyperaldosteronism, hypercortisolism, hyperparathyroidism, a hypertension causing med, pheochromocytoma, renovascular disease, sleep apnea or a thyroid problem.   Arthritis   Presents for follow-up visit. He complains of pain and stiffness. He reports no joint swelling or joint warmth. The symptoms have been stable. Affected locations include the neck, right hip, left hip, right knee, left knee, right ankle, left ankle, right foot, left foot, right wrist, left wrist, right elbow, left elbow, right shoulder and left shoulder. His pain is at a severity of 9/10. Associated symptoms include dry eyes, pain at night, pain while resting and uveitis. Pertinent negatives include no diarrhea, dry mouth, dysuria, fatigue, fever, rash, Raynaud's syndrome or weight loss. Side effects of treatment include joint pain and limb pain.   Chronic Pain  Past Medical History includes:  rheumatoid arthritis and chronic pain syndrome. Patient states that the pain is chronic. Beau describes pain involving the cervical spine, lumbar spine, thoracic spine, knee and hip. The onset of his pain began more than 1 year ago. The frequency of pain is described as constantly. Progression of pain since onset is waxing and waning. Patient describes pain as a 8/10. Beau is currently treating his symptoms with oxycodone/acetaminophen (Percocet) and tramadol. Patient has shown moderate improvement with current treatment.  Goals of therapy include: Improve ADL's. Beau has previously tried hydrocodone/acetaminophen (Hycet, Lorcet, Lortab, Norco,Vicodin), Nerve Block, NSAIDs, Gabapentin (Neurontin) and oxycodone/acetaminophen (Percocet) to treat his pain. Associated symptoms include: leg pain, paresis, tingling and ADLs performed poorly. Previous Imaging studies include: MRI Scan and X-Ray.  Patient has not had a drug screen. Patient does have a pain contract. Prescription Monitoring Program has been reviewed.. Patient's history of substance abuse includes: none. Patient's psychiatric disorders include: none.       ROS  Review of Systems   Constitutional: Negative for fatigue, fever, malaise/fatigue and weight loss.   Eyes: Negative for blurred vision.   Respiratory: Negative for shortness of breath.    Cardiovascular: Negative for chest pain, palpitations, orthopnea and PND.   Gastrointestinal: Negative for abdominal pain, bowel incontinence, constipation and diarrhea.   Genitourinary: Negative for bladder incontinence and dysuria.   Musculoskeletal: Positive for arthritis, back pain and stiffness. Negative for joint swelling and neck pain.   Skin: Negative for rash.   Neurological: Positive for tingling. Negative for dizziness, weakness, numbness, headaches and paresthesias.       Physical Exam  Vitals:    09/21/18 0957   BP: (!) 132/100   Pulse: 68   Resp: 17   Temp: 97.8 °F (36.6 °C)    Body mass index is  "29.18 kg/m².  Weight: 82 kg (180 lb 12.4 oz)   Height: 5' 6" (167.6 cm)     Physical Exam   Constitutional: He is oriented to person, place, and time. He appears well-developed and well-nourished. He is active and cooperative.  Non-toxic appearance. He does not have a sickly appearance. He does not appear ill. No distress.   HENT:   Head: Normocephalic and atraumatic.   Right Ear: Hearing, tympanic membrane, external ear and ear canal normal. No tenderness. No foreign bodies. No mastoid tenderness. Tympanic membrane is not injected, not scarred, not perforated, not erythematous, not retracted and not bulging. No hemotympanum. No decreased hearing is noted.   Left Ear: Hearing, tympanic membrane, external ear and ear canal normal. No tenderness. No foreign bodies. No mastoid tenderness. Tympanic membrane is not injected, not scarred, not perforated, not erythematous, not retracted and not bulging. No hemotympanum. No decreased hearing is noted.   Nose: Nose normal. No rhinorrhea, sinus tenderness, nasal deformity or septal deviation. No epistaxis.  No foreign bodies.   Mouth/Throat: Uvula is midline, oropharynx is clear and moist and mucous membranes are normal. He does not have dentures. Normal dentition. No uvula swelling or dental caries. No oropharyngeal exudate, posterior oropharyngeal edema, posterior oropharyngeal erythema or tonsillar abscesses.   Eyes: Conjunctivae, EOM and lids are normal. Pupils are equal, round, and reactive to light. Right eye exhibits no chemosis, no discharge, no exudate and no hordeolum. No foreign body present in the right eye. Left eye exhibits no chemosis, no discharge, no exudate and no hordeolum. No foreign body present in the left eye. No scleral icterus.   Neck: Normal range of motion and full passive range of motion without pain. Neck supple. No thyroid mass and no thyromegaly present.   Cardiovascular: Normal rate, regular rhythm, S1 normal, S2 normal and normal heart sounds. " Exam reveals no gallop and no friction rub.   No murmur heard.  Pulmonary/Chest: Effort normal and breath sounds normal. No accessory muscle usage. No respiratory distress. He has no decreased breath sounds. He has no wheezes. He has no rhonchi. He has no rales.   Abdominal: Soft. Normal appearance and bowel sounds are normal. He exhibits no distension, no ascites and no mass. There is no hepatosplenomegaly. There is no tenderness. There is no rigidity, no rebound and no guarding. No hernia.   Musculoskeletal:        Right hip: He exhibits tenderness and bony tenderness. He exhibits normal range of motion, normal strength, no swelling, no crepitus, no deformity and no laceration.        Left hip: He exhibits tenderness and bony tenderness. He exhibits normal range of motion, normal strength, no swelling, no crepitus, no deformity and no laceration.        Cervical back: He exhibits decreased range of motion and pain. He exhibits no tenderness, no bony tenderness, no swelling, no edema, no deformity, no laceration, no spasm and normal pulse.        Lumbar back: He exhibits decreased range of motion, bony tenderness and pain. He exhibits no tenderness, no swelling, no edema, no deformity, no laceration, no spasm and normal pulse.        Right hand: He exhibits decreased range of motion, tenderness, bony tenderness, deformity and swelling. He exhibits normal two-point discrimination, normal capillary refill and no laceration. Normal sensation noted. Decreased sensation is not present in the ulnar distribution and is not present in the medial distribution. Decreased strength noted. He exhibits finger abduction and thumb/finger opposition. He exhibits no wrist extension trouble.        Right foot: There is decreased range of motion, tenderness and swelling. There is no bony tenderness, normal capillary refill, no crepitus, no deformity and no laceration.        Left foot: There is decreased range of motion, tenderness  and swelling. There is no bony tenderness, normal capillary refill, no crepitus, no deformity and no laceration.   Lymphadenopathy:        Head (right side): No submental, no submandibular, no preauricular and no posterior auricular adenopathy present.        Head (left side): No submental, no submandibular, no preauricular and no posterior auricular adenopathy present.     He has no cervical adenopathy.   Neurological: He is alert and oriented to person, place, and time. He has normal strength. He displays no atrophy and no tremor. No cranial nerve deficit or sensory deficit. He exhibits normal muscle tone. He displays no seizure activity. Coordination and gait normal.   Skin: Skin is warm, dry and intact. No rash noted. He is not diaphoretic. No erythema. No pallor.   Covered in multiple tattoos over face, torso, and arms.   Psychiatric: He has a normal mood and affect. His speech is normal and behavior is normal. Judgment and thought content normal. His mood appears not anxious. His affect is not angry, not blunt, not labile and not inappropriate. Cognition and memory are normal. He does not exhibit a depressed mood. He is attentive.   Vitals reviewed.      Assessment & Plan    1. Ankylosing spondylitis of multiple sites in spine  Discussed rules regarding chronic pain management with opiate/opioid therapy.  Discussed use of alternative medications to manage pain with goal of reducing and possibly discontinue opioid therapy, and advised that in order to continue current therapy they would need to schedule appointment at least once every 90 days, as well as consent to random urine drug screening.  The legitimate medical purpose of using a controlled substance for pain management is chronic pain due to AS.  Patient has been screened through the [facility] and is not receiving controlled substances from any other provider.  At this time, I have no suspicion of illegal activity and feel that with proper usage,  there is low risk for overdose.     - oxyCODONE-acetaminophen (PERCOCET)  mg per tablet; Take 1 tablet by mouth 3 (three) times daily as needed.  Dispense: 90 tablet; Refill: 0  - oxyCODONE-acetaminophen (PERCOCET)  mg per tablet; Take 1 tablet by mouth 3 (three) times daily as needed.  Dispense: 90 tablet; Refill: 0  - oxyCODONE-acetaminophen (PERCOCET)  mg per tablet; Take 1 tablet by mouth 3 (three) times daily as needed.  Dispense: 90 tablet; Refill: 0    2. Essential hypertension  Patient was counseled and encouraged to maintain a low sodium diet, as well as increasing physical activity.  Recommend random BP checks at home on a regular basis.  Repeat BP at end of visit was not necessary. Will start medication at this time, and follow up in 3-6 months, or sooner if blood pressure begins to increase.     - amLODIPine (NORVASC) 5 MG tablet; Take 1 tablet (5 mg total) by mouth once daily.  Dispense: 30 tablet; Refill: 5    3. Degeneration of lumbar or lumbosacral intervertebral disc  The current medical regimen is effective at this time and no changes to present plan or medications will be made at this visit.     - oxyCODONE-acetaminophen (PERCOCET)  mg per tablet; Take 1 tablet by mouth 3 (three) times daily as needed.  Dispense: 90 tablet; Refill: 0  - oxyCODONE-acetaminophen (PERCOCET)  mg per tablet; Take 1 tablet by mouth 3 (three) times daily as needed.  Dispense: 90 tablet; Refill: 0  - oxyCODONE-acetaminophen (PERCOCET)  mg per tablet; Take 1 tablet by mouth 3 (three) times daily as needed.  Dispense: 90 tablet; Refill: 0    4. Need for influenza vaccination  Patient requested Flu vaccine, and was consented and vaccine given in office without complication.   - Influenza - Quadrivalent (3 years & older) (PF)      Follow up documented    ACTIVE MEDICAL ISSUES:  Documented in Problem List    PAST MEDICAL HISTORY  Documented    PAST SURGICAL HISTORY:  Documented    SOCIAL  HISTORY:  Documented    FAMILY HISTORY:  Documented    ALLERGIES AND MEDICATIONS: updated and reviewed.  Documented    Health Maintenance       Date Due Completion Date    Influenza Vaccine 08/01/2018 10/12/2017 (Declined)    Override on 10/12/2017: Declined    Lipid Panel 06/20/2019 6/20/2018    TETANUS VACCINE 04/10/2027 4/10/2017 (Done)    Override on 4/10/2017: Done ([facility] - ED visit following dog bite)

## 2018-11-05 DIAGNOSIS — M06.4 INFLAMMATORY POLYARTHRITIS: ICD-10-CM

## 2018-11-05 DIAGNOSIS — M45.0 ANKYLOSING SPONDYLITIS OF MULTIPLE SITES IN SPINE: ICD-10-CM

## 2018-11-06 DIAGNOSIS — M45.0 ANKYLOSING SPONDYLITIS OF MULTIPLE SITES IN SPINE: ICD-10-CM

## 2018-11-06 DIAGNOSIS — M06.4 INFLAMMATORY POLYARTHRITIS: ICD-10-CM

## 2018-12-06 DIAGNOSIS — M06.4 INFLAMMATORY POLYARTHRITIS: ICD-10-CM

## 2018-12-06 DIAGNOSIS — M45.0 ANKYLOSING SPONDYLITIS OF MULTIPLE SITES IN SPINE: ICD-10-CM
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
## 2018-12-12 ENCOUNTER — OFFICE VISIT (OUTPATIENT)
Dept: RHEUMATOLOGY | Facility: CLINIC | Age: 48
End: 2018-12-12
Payer: MEDICARE

## 2018-12-12 VITALS
HEIGHT: 68 IN | WEIGHT: 177.5 LBS | DIASTOLIC BLOOD PRESSURE: 81 MMHG | HEART RATE: 81 BPM | BODY MASS INDEX: 26.9 KG/M2 | SYSTOLIC BLOOD PRESSURE: 125 MMHG

## 2018-12-12 DIAGNOSIS — M81.0 OSTEOPOROSIS WITHOUT CURRENT PATHOLOGICAL FRACTURE, UNSPECIFIED OSTEOPOROSIS TYPE: ICD-10-CM

## 2018-12-12 DIAGNOSIS — Z79.60 LONG-TERM USE OF IMMUNOSUPPRESSANT MEDICATION: ICD-10-CM

## 2018-12-12 DIAGNOSIS — E55.9 VITAMIN D INSUFFICIENCY: ICD-10-CM

## 2018-12-12 DIAGNOSIS — M47.899 HLA-B27 SPONDYLOARTHROPATHY: ICD-10-CM

## 2018-12-12 DIAGNOSIS — H20.021 IRITIS, RECURRENT, RIGHT: ICD-10-CM

## 2018-12-12 DIAGNOSIS — M06.4 INFLAMMATORY POLYARTHRITIS: Primary | ICD-10-CM

## 2018-12-12 DIAGNOSIS — M45.0 ANKYLOSING SPONDYLITIS OF MULTIPLE SITES IN SPINE: ICD-10-CM

## 2018-12-12 PROCEDURE — 99214 OFFICE O/P EST MOD 30 MIN: CPT | Mod: PBBFAC | Performed by: INTERNAL MEDICINE

## 2018-12-12 PROCEDURE — 99214 OFFICE O/P EST MOD 30 MIN: CPT | Mod: S$PBB,,, | Performed by: INTERNAL MEDICINE

## 2018-12-12 PROCEDURE — 99999 PR PBB SHADOW E&M-EST. PATIENT-LVL IV: CPT | Mod: PBBFAC,,, | Performed by: INTERNAL MEDICINE

## 2018-12-12 ASSESSMENT — ROUTINE ASSESSMENT OF PATIENT INDEX DATA (RAPID3)
PAIN SCORE: 9
MDHAQ FUNCTION SCORE: 2.2
WHEN YOU AWAKENED IN THE MORNING OVER THE LAST WEEK, PLEASE INDICATE THE AMOUNT OF TIME IT TAKES UNTIL YOU ARE AS LIMBER AS YOU WILL BE FOR THE DAY: 30 MINUTES
PATIENT GLOBAL ASSESSMENT SCORE: 9
AM STIFFNESS SCORE: 1, YES
TOTAL RAPID3 SCORE: 8.44
FATIGUE SCORE: 5
PSYCHOLOGICAL DISTRESS SCORE: 4.4

## 2018-12-21 ENCOUNTER — OFFICE VISIT (OUTPATIENT)
Dept: FAMILY MEDICINE | Facility: CLINIC | Age: 48
End: 2018-12-21
Payer: MEDICARE

## 2018-12-21 VITALS
OXYGEN SATURATION: 98 % | SYSTOLIC BLOOD PRESSURE: 110 MMHG | HEIGHT: 68 IN | BODY MASS INDEX: 27.03 KG/M2 | WEIGHT: 178.38 LBS | RESPIRATION RATE: 16 BRPM | DIASTOLIC BLOOD PRESSURE: 86 MMHG | HEART RATE: 86 BPM | TEMPERATURE: 98 F

## 2018-12-21 DIAGNOSIS — M51.37 DEGENERATION OF LUMBAR OR LUMBOSACRAL INTERVERTEBRAL DISC: ICD-10-CM

## 2018-12-21 DIAGNOSIS — M45.0 ANKYLOSING SPONDYLITIS OF MULTIPLE SITES IN SPINE: Primary | ICD-10-CM

## 2018-12-21 PROCEDURE — 99215 OFFICE O/P EST HI 40 MIN: CPT | Mod: S$PBB,,, | Performed by: FAMILY MEDICINE

## 2018-12-21 PROCEDURE — 99213 OFFICE O/P EST LOW 20 MIN: CPT | Mod: PBBFAC,PO | Performed by: FAMILY MEDICINE

## 2018-12-21 PROCEDURE — 99999 PR PBB SHADOW E&M-EST. PATIENT-LVL III: CPT | Mod: PBBFAC,,, | Performed by: FAMILY MEDICINE

## 2018-12-21 RX ORDER — OXYCODONE AND ACETAMINOPHEN 10; 325 MG/1; MG/1
1 TABLET ORAL 3 TIMES DAILY PRN
Qty: 90 TABLET | Refills: 0 | Status: SHIPPED | OUTPATIENT
Start: 2019-01-18 | End: 2019-02-17

## 2018-12-21 RX ORDER — OXYCODONE AND ACETAMINOPHEN 10; 325 MG/1; MG/1
1 TABLET ORAL 3 TIMES DAILY PRN
Qty: 90 TABLET | Refills: 0 | Status: SHIPPED | OUTPATIENT
Start: 2018-12-21 | End: 2019-01-20

## 2018-12-21 RX ORDER — OXYCODONE AND ACETAMINOPHEN 10; 325 MG/1; MG/1
1 TABLET ORAL 3 TIMES DAILY PRN
Qty: 90 TABLET | Refills: 0 | Status: SHIPPED | OUTPATIENT
Start: 2019-02-15 | End: 2019-03-08 | Stop reason: SDUPTHER

## 2018-12-21 RX ORDER — AMLODIPINE BESYLATE 5 MG/1
5 TABLET ORAL DAILY
Refills: 5 | COMMUNITY
Start: 2018-12-08 | End: 2019-01-31 | Stop reason: SDUPTHER

## 2018-12-21 NOTE — PROGRESS NOTES
Chief Complaint   Patient presents with    Hypertension     3 months follow up     Ankylosing Spondylitis    Medication Refill       Beau Ferraro is a 48 y.o. male who presents per the Chief Complaint.  Pt is known to me and was last seen by me on 9/21/2018.  All known chronic medical issues have been documented.       Hypertension   This is a new problem. The current episode started more than 1 month ago. The problem is unchanged. The problem is uncontrolled. Pertinent negatives include no anxiety, blurred vision, chest pain, headaches, malaise/fatigue, neck pain, orthopnea, palpitations, peripheral edema, PND, shortness of breath or sweats. There are no associated agents to hypertension. Risk factors for coronary artery disease include male gender and sedentary lifestyle. Past treatments include nothing. Compliance problems include exercise and psychosocial issues.  There is no history of angina, kidney disease, CAD/MI, CVA, heart failure, left ventricular hypertrophy, PVD or retinopathy. There is no history of chronic renal disease, coarctation of the aorta, hyperaldosteronism, hypercortisolism, hyperparathyroidism, a hypertension causing med, pheochromocytoma, renovascular disease, sleep apnea or a thyroid problem.   Medication Refill   Associated symptoms include arthralgias (right hand; most joints) and myalgias. Pertinent negatives include no abdominal pain, chest pain, chills, congestion, coughing, diaphoresis, fatigue, fever, headaches, joint swelling, nausea, neck pain, numbness, rash, sore throat, vomiting or weakness.   Arthritis   Presents for follow-up visit. He complains of pain and stiffness. He reports no joint swelling or joint warmth. The symptoms have been stable. Affected locations include the neck, right hip, left hip, right knee, left knee, right ankle, left ankle, right foot, left foot, right wrist, left wrist, right elbow, left elbow, right shoulder and left shoulder. His pain is at a  severity of 9/10. Associated symptoms include dry eyes, pain at night, pain while resting and uveitis. Pertinent negatives include no diarrhea, dry mouth, dysuria, fatigue, fever, rash, Raynaud's syndrome or weight loss. Side effects of treatment include joint pain and limb pain.   Chronic Pain  Past Medical History includes: rheumatoid arthritis and chronic pain syndrome. Patient states that the pain is chronic. Beau describes pain involving the cervical spine, lumbar spine, thoracic spine, knee and hip. The onset of his pain began more than 1 year ago. The frequency of pain is described as constantly. Progression of pain since onset is waxing and waning. Patient describes pain as a 8/10. Beau is currently treating his symptoms with oxycodone/acetaminophen (Percocet) and tramadol. Patient has shown moderate improvement with current treatment.  Goals of therapy include: Improve ADL's. Beau has previously tried hydrocodone/acetaminophen (Hycet, Lorcet, Lortab, Norco,Vicodin), Nerve Block, NSAIDs, Gabapentin (Neurontin) and oxycodone/acetaminophen (Percocet) to treat his pain. Associated symptoms include: leg pain, paresis, tingling and ADLs performed poorly. Previous Imaging studies include: MRI Scan and X-Ray.  Patient has not had a drug screen. Patient does have a pain contract. Prescription Monitoring Program has been reviewed.. Patient's history of substance abuse includes: none. Patient's psychiatric disorders include: none.       ROS  Review of Systems   Constitutional: Negative.  Negative for activity change, appetite change, chills, diaphoresis, fatigue, fever, malaise/fatigue, unexpected weight change and weight loss.   HENT: Negative.  Negative for congestion, ear pain, hearing loss, nosebleeds, postnasal drip, rhinorrhea, sinus pressure, sneezing, sore throat and trouble swallowing.    Eyes: Negative for blurred vision, pain and visual disturbance.   Respiratory: Negative for apnea, cough, choking,  "chest tightness and shortness of breath.    Cardiovascular: Negative for chest pain, palpitations, orthopnea, leg swelling and PND.   Gastrointestinal: Negative for abdominal distention, abdominal pain, anal bleeding, blood in stool, bowel incontinence, constipation, diarrhea, nausea, rectal pain and vomiting.   Genitourinary: Negative for bladder incontinence, difficulty urinating, dysuria, flank pain, frequency, penile pain, penile swelling, scrotal swelling, testicular pain and urgency.   Musculoskeletal: Positive for arthralgias (right hand; most joints), arthritis, back pain, gait problem, myalgias, neck stiffness and stiffness. Negative for joint swelling and neck pain.   Skin: Negative.  Negative for rash.   Allergic/Immunologic: Negative for environmental allergies, food allergies and immunocompromised state.   Neurological: Positive for tingling. Negative for dizziness, seizures, syncope, weakness, light-headedness, numbness, headaches and paresthesias.   Psychiatric/Behavioral: Negative.  Negative for confusion, decreased concentration, dysphoric mood and sleep disturbance. The patient is not nervous/anxious.        Physical Exam  Vitals:    12/21/18 1011   BP: 110/86   Pulse: 86   Resp: 16   Temp: 98.4 °F (36.9 °C)    Body mass index is 27.12 kg/m².  Weight: 80.9 kg (178 lb 5.6 oz)   Height: 5' 8" (172.7 cm)     Physical Exam   Constitutional: He is oriented to person, place, and time. He appears well-developed and well-nourished. He is active and cooperative.  Non-toxic appearance. He does not have a sickly appearance. He does not appear ill. No distress.   HENT:   Head: Normocephalic and atraumatic.   Right Ear: Hearing, tympanic membrane, external ear and ear canal normal. No tenderness. No foreign bodies. No mastoid tenderness. Tympanic membrane is not injected, not scarred, not perforated, not erythematous, not retracted and not bulging. No hemotympanum. No decreased hearing is noted.   Left Ear: " Hearing, tympanic membrane, external ear and ear canal normal. No tenderness. No foreign bodies. No mastoid tenderness. Tympanic membrane is not injected, not scarred, not perforated, not erythematous, not retracted and not bulging. No hemotympanum. No decreased hearing is noted.   Nose: Nose normal. No rhinorrhea, sinus tenderness, nasal deformity or septal deviation. No epistaxis.  No foreign bodies.   Mouth/Throat: Uvula is midline, oropharynx is clear and moist and mucous membranes are normal. He does not have dentures. Normal dentition. No uvula swelling or dental caries. No oropharyngeal exudate, posterior oropharyngeal edema, posterior oropharyngeal erythema or tonsillar abscesses.   Eyes: Conjunctivae, EOM and lids are normal. Pupils are equal, round, and reactive to light. Right eye exhibits no chemosis, no discharge, no exudate and no hordeolum. No foreign body present in the right eye. Left eye exhibits no chemosis, no discharge, no exudate and no hordeolum. No foreign body present in the left eye. No scleral icterus.   Neck: Normal range of motion and full passive range of motion without pain. Neck supple. No thyroid mass and no thyromegaly present.   Cardiovascular: Normal rate, regular rhythm, S1 normal, S2 normal and normal heart sounds. Exam reveals no gallop and no friction rub.   No murmur heard.  Pulmonary/Chest: Effort normal and breath sounds normal. No accessory muscle usage. No respiratory distress. He has no decreased breath sounds. He has no wheezes. He has no rhonchi. He has no rales.   Abdominal: Soft. Normal appearance and bowel sounds are normal. He exhibits no distension, no ascites and no mass. There is no hepatosplenomegaly. There is no tenderness. There is no rigidity, no rebound and no guarding. No hernia.   Musculoskeletal:        Right hip: He exhibits tenderness and bony tenderness. He exhibits normal range of motion, normal strength, no swelling, no crepitus, no deformity and no  laceration.        Left hip: He exhibits tenderness and bony tenderness. He exhibits normal range of motion, normal strength, no swelling, no crepitus, no deformity and no laceration.        Cervical back: He exhibits decreased range of motion and pain. He exhibits no tenderness, no bony tenderness, no swelling, no edema, no deformity, no laceration, no spasm and normal pulse.        Lumbar back: He exhibits decreased range of motion, bony tenderness and pain. He exhibits no tenderness, no swelling, no edema, no deformity, no laceration, no spasm and normal pulse.        Right hand: He exhibits decreased range of motion, tenderness, bony tenderness, deformity and swelling. He exhibits normal two-point discrimination, normal capillary refill and no laceration. Normal sensation noted. Decreased sensation is not present in the ulnar distribution and is not present in the medial distribution. Decreased strength noted. He exhibits finger abduction and thumb/finger opposition. He exhibits no wrist extension trouble.        Right foot: There is decreased range of motion, tenderness and swelling. There is no bony tenderness, normal capillary refill, no crepitus, no deformity and no laceration.        Left foot: There is decreased range of motion, tenderness and swelling. There is no bony tenderness, normal capillary refill, no crepitus, no deformity and no laceration.   Lymphadenopathy:        Head (right side): No submental, no submandibular, no preauricular and no posterior auricular adenopathy present.        Head (left side): No submental, no submandibular, no preauricular and no posterior auricular adenopathy present.     He has no cervical adenopathy.   Neurological: He is alert and oriented to person, place, and time. He has normal strength. He displays no atrophy and no tremor. No cranial nerve deficit or sensory deficit. He exhibits normal muscle tone. He displays no seizure activity. Coordination and gait normal.    Skin: Skin is warm, dry and intact. No rash noted. He is not diaphoretic. No erythema. No pallor.   Covered in multiple tattoos over face, torso, and arms.   Psychiatric: He has a normal mood and affect. His speech is normal and behavior is normal. Judgment and thought content normal. His mood appears not anxious. His affect is not angry, not blunt, not labile and not inappropriate. Cognition and memory are normal. He does not exhibit a depressed mood. He is attentive.   Vitals reviewed.      Assessment & Plan    1. Ankylosing spondylitis of multiple sites in spine  Discussed rules regarding chronic pain management with opiate/opioid therapy.  Discussed use of alternative medications to manage pain with goal of reducing and possibly discontinue opioid therapy, and advised that in order to continue current therapy they would need to schedule appointment at least once every 90 days, as well as consent to random urine drug screening.  The legitimate medical purpose of using a controlled substance for pain management is chronic rheumatological pain.  Patient has been screened through the [facility] and is not receiving controlled substances from any other provider.  At this time, I have no suspicion of illegal activity and feel that with proper usage, there is low risk for overdose.  Managed by Rheumatology.  Stable on Humira.    - oxyCODONE-acetaminophen (PERCOCET)  mg per tablet; Take 1 tablet by mouth 3 (three) times daily as needed.  Dispense: 90 tablet; Refill: 0  - oxyCODONE-acetaminophen (PERCOCET)  mg per tablet; Take 1 tablet by mouth 3 (three) times daily as needed.  Dispense: 90 tablet; Refill: 0  - oxyCODONE-acetaminophen (PERCOCET)  mg per tablet; Take 1 tablet by mouth 3 (three) times daily as needed.  Dispense: 90 tablet; Refill: 0    2. Degeneration of lumbar or lumbosacral intervertebral disc  The current medical regimen is effective at this time and no changes to present plan or  medications will be made at this visit.     - oxyCODONE-acetaminophen (PERCOCET)  mg per tablet; Take 1 tablet by mouth 3 (three) times daily as needed.  Dispense: 90 tablet; Refill: 0  - oxyCODONE-acetaminophen (PERCOCET)  mg per tablet; Take 1 tablet by mouth 3 (three) times daily as needed.  Dispense: 90 tablet; Refill: 0  - oxyCODONE-acetaminophen (PERCOCET)  mg per tablet; Take 1 tablet by mouth 3 (three) times daily as needed.  Dispense: 90 tablet; Refill: 0      Follow up documented    ACTIVE MEDICAL ISSUES:  Documented in Problem List    PAST MEDICAL HISTORY  Documented    PAST SURGICAL HISTORY:  Documented    SOCIAL HISTORY:  Documented    FAMILY HISTORY:  Documented    ALLERGIES AND MEDICATIONS: updated and reviewed.  Documented    Health Maintenance       Date Due Completion Date    Lipid Panel 06/20/2019 6/20/2018    TETANUS VACCINE 04/10/2027 4/10/2017 (Done)    Override on 4/10/2017: Done ([facility] - ED visit following dog bite)

## 2019-01-31 RX ORDER — AMLODIPINE BESYLATE 5 MG/1
5 TABLET ORAL DAILY
Qty: 30 TABLET | Refills: 5 | Status: SHIPPED | OUTPATIENT
Start: 2019-01-31 | End: 2019-06-11 | Stop reason: SDUPTHER

## 2019-03-08 ENCOUNTER — OFFICE VISIT (OUTPATIENT)
Dept: FAMILY MEDICINE | Facility: CLINIC | Age: 49
End: 2019-03-08
Payer: MEDICARE

## 2019-03-08 VITALS
BODY MASS INDEX: 27.96 KG/M2 | SYSTOLIC BLOOD PRESSURE: 130 MMHG | OXYGEN SATURATION: 99 % | RESPIRATION RATE: 16 BRPM | HEIGHT: 68 IN | WEIGHT: 184.5 LBS | HEART RATE: 80 BPM | DIASTOLIC BLOOD PRESSURE: 88 MMHG | TEMPERATURE: 98 F

## 2019-03-08 DIAGNOSIS — B35.3 TINEA PEDIS OF BOTH FEET: ICD-10-CM

## 2019-03-08 DIAGNOSIS — M45.0 ANKYLOSING SPONDYLITIS OF MULTIPLE SITES IN SPINE: Primary | ICD-10-CM

## 2019-03-08 DIAGNOSIS — I10 ESSENTIAL HYPERTENSION: ICD-10-CM

## 2019-03-08 DIAGNOSIS — F11.20 OPIOID DEPENDENCE, CONTINUOUS: ICD-10-CM

## 2019-03-08 DIAGNOSIS — M51.37 DEGENERATION OF LUMBAR OR LUMBOSACRAL INTERVERTEBRAL DISC: ICD-10-CM

## 2019-03-08 PROBLEM — L08.9 LOCAL SKIN INFECTION: Status: RESOLVED | Noted: 2018-01-19 | Resolved: 2019-03-08

## 2019-03-08 LAB
AMP D-AMPHETAMINE 1000 NG/ML: NEGATIVE
BAR SECOBARBITAL 300 NG/ML: NEGATIVE
BUP BUPRENORPHINE 10 NG/ML: POSITIVE
BZO OXAZEPAM 300 NG/ML: NEGATIVE
COC BENZOYLECGONINE 300 NG/ML: NEGATIVE
CTP QC/QA: YES
MET D-METHAMPHETAMINE 500 NG/ML: NEGATIVE
MOP MORPHINE 300 NG/ML: NEGATIVE
MTD METHADONE 300 NG/ML: NEGATIVE
QXY OXYCODONE 100 NG/ML: POSITIVE
THC 11-NOR-9-TETRAHYDROCANNABINOL-9-CARBOXYLIC ACID: NEGATIVE

## 2019-03-08 PROCEDURE — 80305 DRUG TEST PRSMV DIR OPT OBS: CPT | Mod: PBBFAC,PO | Performed by: FAMILY MEDICINE

## 2019-03-08 PROCEDURE — 99999 PR PBB SHADOW E&M-EST. PATIENT-LVL III: ICD-10-PCS | Mod: PBBFAC,,, | Performed by: FAMILY MEDICINE

## 2019-03-08 PROCEDURE — 99999 PR PBB SHADOW E&M-EST. PATIENT-LVL III: CPT | Mod: PBBFAC,,, | Performed by: FAMILY MEDICINE

## 2019-03-08 PROCEDURE — 99213 OFFICE O/P EST LOW 20 MIN: CPT | Mod: PBBFAC,PO | Performed by: FAMILY MEDICINE

## 2019-03-08 PROCEDURE — 99215 OFFICE O/P EST HI 40 MIN: CPT | Mod: S$PBB,,, | Performed by: FAMILY MEDICINE

## 2019-03-08 PROCEDURE — 99215 PR OFFICE/OUTPT VISIT, EST, LEVL V, 40-54 MIN: ICD-10-PCS | Mod: S$PBB,,, | Performed by: FAMILY MEDICINE

## 2019-03-08 RX ORDER — OXYCODONE AND ACETAMINOPHEN 10; 325 MG/1; MG/1
1 TABLET ORAL 3 TIMES DAILY PRN
Qty: 90 TABLET | Refills: 0 | Status: SHIPPED | OUTPATIENT
Start: 2019-04-12 | End: 2019-05-12

## 2019-03-08 RX ORDER — PRENATAL VIT 91/IRON/FOLIC/DHA 28-975-200
COMBINATION PACKAGE (EA) ORAL 2 TIMES DAILY
Qty: 30 G | Refills: 5 | Status: SHIPPED | OUTPATIENT
Start: 2019-03-08 | End: 2019-06-11 | Stop reason: SDUPTHER

## 2019-03-08 RX ORDER — OXYCODONE AND ACETAMINOPHEN 10; 325 MG/1; MG/1
1 TABLET ORAL 3 TIMES DAILY PRN
Qty: 90 TABLET | Refills: 0 | Status: SHIPPED | OUTPATIENT
Start: 2019-03-15 | End: 2019-04-14

## 2019-03-08 RX ORDER — OXYCODONE AND ACETAMINOPHEN 10; 325 MG/1; MG/1
1 TABLET ORAL 3 TIMES DAILY PRN
Qty: 90 TABLET | Refills: 0 | Status: SHIPPED | OUTPATIENT
Start: 2019-05-10 | End: 2019-06-11 | Stop reason: SDUPTHER

## 2019-03-08 RX ORDER — ECONAZOLE NITRATE 10 MG/G
CREAM TOPICAL 2 TIMES DAILY
Qty: 85 G | Refills: 5 | Status: CANCELLED | OUTPATIENT
Start: 2019-03-08

## 2019-03-08 NOTE — PROGRESS NOTES
Chief Complaint   Patient presents with    spinal stenosis    Back Pain    Medication Refill       Beau Ferraro is a 48 y.o. male who presents per the Chief Complaint.  Pt is known to me and was last seen by me on 12/21/2018.  All known chronic medical issues have been documented.  Patient reports continued symptoms with no complications and no signs of misuse or abuse of his opioid therapy.      Back Pain   This is a chronic problem. The current episode started more than 1 year ago. The problem occurs daily. The problem is unchanged. The pain is present in the lumbar spine. The quality of the pain is described as aching. The pain does not radiate. The pain is at a severity of 7/10. The pain is moderate. The pain is the same all the time. The symptoms are aggravated by bending, position, standing and lying down. Stiffness is present all day. Associated symptoms include leg pain, paresis and tingling. Pertinent negatives include no abdominal pain, bladder incontinence, bowel incontinence, chest pain, dysuria, fever, headaches, numbness, paresthesias, pelvic pain, perianal numbness, weakness or weight loss. Risk factors include lack of exercise and poor posture. He has tried analgesics and muscle relaxant for the symptoms. The treatment provided moderate relief.   Hypertension   This is a new problem. The current episode started more than 1 month ago. The problem is unchanged. The problem is uncontrolled. Pertinent negatives include no anxiety, blurred vision, chest pain, headaches, malaise/fatigue, neck pain, orthopnea, palpitations, peripheral edema, PND, shortness of breath or sweats. There are no associated agents to hypertension. Risk factors for coronary artery disease include male gender and sedentary lifestyle. Past treatments include nothing. Compliance problems include exercise and psychosocial issues.  There is no history of angina, kidney disease, CAD/MI, CVA, heart failure, left ventricular  hypertrophy, PVD or retinopathy. There is no history of chronic renal disease, coarctation of the aorta, hyperaldosteronism, hypercortisolism, hyperparathyroidism, a hypertension causing med, pheochromocytoma, renovascular disease, sleep apnea or a thyroid problem.   Arthritis   Presents for follow-up visit. He complains of pain and stiffness. He reports no joint swelling or joint warmth. The symptoms have been stable. Affected locations include the neck, right hip, left hip, right knee, left knee, right ankle, left ankle, right foot, left foot, right wrist, left wrist, right elbow, left elbow, right shoulder and left shoulder. His pain is at a severity of 9/10. Associated symptoms include dry eyes, pain at night, pain while resting and uveitis. Pertinent negatives include no diarrhea, dry mouth, dysuria, fatigue, fever, rash, Raynaud's syndrome or weight loss. Side effects of treatment include joint pain and limb pain.   Chronic Pain  Past Medical History includes: rheumatoid arthritis and chronic pain syndrome. Patient states that the pain is chronic. Beau describes pain involving the cervical spine, lumbar spine, thoracic spine, knee and hip. The onset of his pain began more than 1 year ago. The frequency of pain is described as constantly. Progression of pain since onset is waxing and waning. Patient describes pain as a 8/10. Beau is currently treating his symptoms with oxycodone/acetaminophen (Percocet) and tramadol. Patient has shown moderate improvement with current treatment.  Goals of therapy include: Improve ADL's. Beau has previously tried hydrocodone/acetaminophen (Hycet, Lorcet, Lortab, Norco,Vicodin), Nerve Block, NSAIDs, Gabapentin (Neurontin) and oxycodone/acetaminophen (Percocet) to treat his pain. Associated symptoms include: leg pain, paresis, tingling and ADLs performed poorly. Previous Imaging studies include: MRI Scan and X-Ray.  Patient has not had a drug screen. Patient does have a pain  contract. Prescription Monitoring Program has been reviewed.. Patient's history of substance abuse includes: none. Patient's psychiatric disorders include: none.       ROS  Review of Systems   Constitutional: Negative.  Negative for activity change, appetite change, chills, diaphoresis, fatigue, fever, malaise/fatigue, unexpected weight change and weight loss.   HENT: Negative.  Negative for congestion, ear pain, hearing loss, nosebleeds, postnasal drip, rhinorrhea, sinus pressure, sneezing, sore throat and trouble swallowing.    Eyes: Negative for blurred vision, pain and visual disturbance.   Respiratory: Negative for apnea, cough, choking, chest tightness and shortness of breath.    Cardiovascular: Negative for chest pain, palpitations, orthopnea, leg swelling and PND.   Gastrointestinal: Negative for abdominal distention, abdominal pain, anal bleeding, blood in stool, bowel incontinence, constipation, diarrhea, nausea, rectal pain and vomiting.   Genitourinary: Negative for bladder incontinence, difficulty urinating, dysuria, flank pain, frequency, pelvic pain, penile pain, penile swelling, scrotal swelling, testicular pain and urgency.   Musculoskeletal: Positive for arthralgias (right hand; most joints), arthritis, back pain, gait problem, myalgias, neck stiffness and stiffness. Negative for joint swelling and neck pain.   Skin: Negative.  Negative for rash.   Allergic/Immunologic: Negative for environmental allergies, food allergies and immunocompromised state.   Neurological: Positive for tingling. Negative for dizziness, seizures, syncope, weakness, light-headedness, numbness, headaches and paresthesias.   Psychiatric/Behavioral: Positive for dysphoric mood. Negative for confusion, decreased concentration and sleep disturbance. The patient is nervous/anxious.        Physical Exam  Vitals:    03/08/19 1023   BP: 130/88   Pulse: 80   Resp: 16   Temp: 97.5 °F (36.4 °C)    Body mass index is 28.06  "kg/m².  Weight: 83.7 kg (184 lb 8.4 oz)   Height: 5' 8" (172.7 cm)     Physical Exam   Constitutional: He is oriented to person, place, and time. He appears well-developed and well-nourished. He is active and cooperative.  Non-toxic appearance. He does not have a sickly appearance. He does not appear ill. No distress.   HENT:   Head: Normocephalic and atraumatic.   Right Ear: Hearing, tympanic membrane, external ear and ear canal normal. No tenderness. No foreign bodies. No mastoid tenderness. Tympanic membrane is not injected, not scarred, not perforated, not erythematous, not retracted and not bulging. No hemotympanum. No decreased hearing is noted.   Left Ear: Hearing, tympanic membrane, external ear and ear canal normal. No tenderness. No foreign bodies. No mastoid tenderness. Tympanic membrane is not injected, not scarred, not perforated, not erythematous, not retracted and not bulging. No hemotympanum. No decreased hearing is noted.   Nose: Nose normal. No rhinorrhea, sinus tenderness, nasal deformity or septal deviation. No epistaxis.  No foreign bodies.   Mouth/Throat: Uvula is midline, oropharynx is clear and moist and mucous membranes are normal. He does not have dentures. Normal dentition. No uvula swelling or dental caries. No oropharyngeal exudate, posterior oropharyngeal edema, posterior oropharyngeal erythema or tonsillar abscesses.   Eyes: Conjunctivae, EOM and lids are normal. Pupils are equal, round, and reactive to light. Right eye exhibits no chemosis, no discharge, no exudate and no hordeolum. No foreign body present in the right eye. Left eye exhibits no chemosis, no discharge, no exudate and no hordeolum. No foreign body present in the left eye. No scleral icterus.   Neck: Normal range of motion and full passive range of motion without pain. Neck supple. No thyroid mass and no thyromegaly present.   Cardiovascular: Normal rate, regular rhythm, S1 normal, S2 normal and normal heart sounds. Exam " reveals no gallop and no friction rub.   No murmur heard.  Pulmonary/Chest: Effort normal and breath sounds normal. No accessory muscle usage. No respiratory distress. He has no decreased breath sounds. He has no wheezes. He has no rhonchi. He has no rales.   Abdominal: Soft. Normal appearance and bowel sounds are normal. He exhibits no distension, no ascites and no mass. There is no hepatosplenomegaly. There is no tenderness. There is no rigidity, no rebound and no guarding. No hernia.   Musculoskeletal:        Right hip: He exhibits tenderness and bony tenderness. He exhibits normal range of motion, normal strength, no swelling, no crepitus, no deformity and no laceration.        Left hip: He exhibits tenderness and bony tenderness. He exhibits normal range of motion, normal strength, no swelling, no crepitus, no deformity and no laceration.        Cervical back: He exhibits decreased range of motion and pain. He exhibits no tenderness, no bony tenderness, no swelling, no edema, no deformity, no laceration, no spasm and normal pulse.        Lumbar back: He exhibits decreased range of motion, bony tenderness and pain. He exhibits no tenderness, no swelling, no edema, no deformity, no laceration, no spasm and normal pulse.        Right hand: He exhibits decreased range of motion, tenderness, bony tenderness, deformity and swelling. He exhibits normal two-point discrimination, normal capillary refill and no laceration. Normal sensation noted. Decreased sensation is not present in the ulnar distribution and is not present in the medial distribution. Decreased strength noted. He exhibits finger abduction and thumb/finger opposition. He exhibits no wrist extension trouble.        Right foot: There is decreased range of motion, tenderness and swelling. There is no bony tenderness, normal capillary refill, no crepitus, no deformity and no laceration.        Left foot: There is decreased range of motion, tenderness and  swelling. There is no bony tenderness, normal capillary refill, no crepitus, no deformity and no laceration.   Lymphadenopathy:        Head (right side): No submental, no submandibular, no preauricular and no posterior auricular adenopathy present.        Head (left side): No submental, no submandibular, no preauricular and no posterior auricular adenopathy present.     He has no cervical adenopathy.   Neurological: He is alert and oriented to person, place, and time. He has normal strength. He displays no atrophy and no tremor. No cranial nerve deficit or sensory deficit. He exhibits normal muscle tone. He displays no seizure activity. Coordination and gait normal.   Skin: Skin is warm, dry and intact. No rash noted. He is not diaphoretic. No erythema. No pallor.   Covered in multiple tattoos over face, torso, and arms.   Psychiatric: He has a normal mood and affect. His speech is normal and behavior is normal. Judgment and thought content normal. His mood appears not anxious. His affect is not angry, not blunt, not labile and not inappropriate. Cognition and memory are normal. He does not exhibit a depressed mood. He is attentive.   Vitals reviewed.      Assessment & Plan    Discussion of plan of care including treatment options regarding health and wellness were reviewed and discussed with patient.  Any changes to medication or treatment plan, as well as an screening blood test, imaging, or referrals to specialist, are documented.  Follow up as indicated.     1. Chronic, continuous use of opioids  - [facility] Drug Screen    2. Tinea pedis of both feet  - econazole nitrate 1 % cream; Apply topically 2 (two) times daily.  Dispense: 85 g; Refill: 5      Follow up documented    ACTIVE MEDICAL ISSUES:  Documented in Problem List    PAST MEDICAL HISTORY  Documented    PAST SURGICAL HISTORY:  Documented    SOCIAL HISTORY:  Documented    FAMILY HISTORY:  Documented    ALLERGIES AND MEDICATIONS: updated and  reviewed.  Documented    Health Maintenance       Date Due Completion Date    Naloxone Prescription 06/23/1988 ---    Urine Drug Screen 08/08/2016 2/8/2016    Lipid Panel 06/20/2019 6/20/2018    TETANUS VACCINE 04/10/2027 4/10/2017 (Done)    Override on 4/10/2017: Done ([facility] - ED visit following dog bite)

## 2019-03-18 ENCOUNTER — TELEPHONE (OUTPATIENT)
Dept: RHEUMATOLOGY | Facility: CLINIC | Age: 49
End: 2019-03-18

## 2019-03-18 DIAGNOSIS — H20.021 IRITIS, RECURRENT, RIGHT: ICD-10-CM

## 2019-03-18 DIAGNOSIS — M47.899 HLA-B27 SPONDYLOARTHROPATHY: ICD-10-CM

## 2019-03-18 DIAGNOSIS — Z79.60 LONG-TERM USE OF IMMUNOSUPPRESSANT MEDICATION: ICD-10-CM

## 2019-03-18 DIAGNOSIS — M06.4 INFLAMMATORY POLYARTHRITIS: ICD-10-CM

## 2019-03-18 DIAGNOSIS — M45.0 ANKYLOSING SPONDYLITIS OF MULTIPLE SITES IN SPINE: ICD-10-CM

## 2019-03-19 ENCOUNTER — TELEPHONE (OUTPATIENT)
Dept: RHEUMATOLOGY | Facility: CLINIC | Age: 49
End: 2019-03-19

## 2019-03-20 ENCOUNTER — TELEPHONE (OUTPATIENT)
Dept: RHEUMATOLOGY | Facility: CLINIC | Age: 49
End: 2019-03-20

## 2019-03-20 NOTE — TELEPHONE ENCOUNTER
Called to inform patient of the need to reschedule 4/22/19 appointment, due to a change in the provider's schedule.  Patient verbalizes understanding.  Appointment rescheduled to 5/2/19.

## 2019-05-10 DIAGNOSIS — M06.4 INFLAMMATORY POLYARTHRITIS: ICD-10-CM

## 2019-05-10 DIAGNOSIS — M45.0 ANKYLOSING SPONDYLITIS OF MULTIPLE SITES IN SPINE: ICD-10-CM

## 2019-05-10 DIAGNOSIS — H20.021 IRITIS, RECURRENT, RIGHT: ICD-10-CM

## 2019-05-10 DIAGNOSIS — M47.899 HLA-B27 SPONDYLOARTHROPATHY: ICD-10-CM

## 2019-05-10 DIAGNOSIS — Z79.60 LONG-TERM USE OF IMMUNOSUPPRESSANT MEDICATION: ICD-10-CM

## 2019-05-10 RX ORDER — ADALIMUMAB 40MG/0.8ML
KIT SUBCUTANEOUS
Qty: 6 PEN | Refills: 0 | Status: SHIPPED | OUTPATIENT
Start: 2019-05-10 | End: 2019-06-12 | Stop reason: SDUPTHER

## 2019-05-28 ENCOUNTER — PATIENT OUTREACH (OUTPATIENT)
Dept: ADMINISTRATIVE | Facility: HOSPITAL | Age: 49
End: 2019-05-28

## 2019-06-11 ENCOUNTER — OFFICE VISIT (OUTPATIENT)
Dept: FAMILY MEDICINE | Facility: CLINIC | Age: 49
End: 2019-06-11
Payer: MEDICARE

## 2019-06-11 VITALS
HEIGHT: 68 IN | HEART RATE: 74 BPM | SYSTOLIC BLOOD PRESSURE: 120 MMHG | RESPIRATION RATE: 17 BRPM | DIASTOLIC BLOOD PRESSURE: 80 MMHG | TEMPERATURE: 98 F | WEIGHT: 177.69 LBS | BODY MASS INDEX: 26.93 KG/M2 | OXYGEN SATURATION: 99 %

## 2019-06-11 DIAGNOSIS — M45.0 ANKYLOSING SPONDYLITIS OF MULTIPLE SITES IN SPINE: Primary | ICD-10-CM

## 2019-06-11 DIAGNOSIS — B36.9 FUNGAL DERMATITIS: ICD-10-CM

## 2019-06-11 DIAGNOSIS — M06.4 INFLAMMATORY POLYARTHRITIS: ICD-10-CM

## 2019-06-11 DIAGNOSIS — I10 ESSENTIAL HYPERTENSION: ICD-10-CM

## 2019-06-11 DIAGNOSIS — T38.0X5A STEROID-INDUCED OSTEOPOROSIS: ICD-10-CM

## 2019-06-11 DIAGNOSIS — M81.8 STEROID-INDUCED OSTEOPOROSIS: ICD-10-CM

## 2019-06-11 DIAGNOSIS — M51.37 DEGENERATION OF LUMBAR OR LUMBOSACRAL INTERVERTEBRAL DISC: ICD-10-CM

## 2019-06-11 DIAGNOSIS — L02.419 AXILLARY ABSCESS: ICD-10-CM

## 2019-06-11 PROCEDURE — 99999 PR PBB SHADOW E&M-EST. PATIENT-LVL IV: ICD-10-PCS | Mod: PBBFAC,,, | Performed by: FAMILY MEDICINE

## 2019-06-11 PROCEDURE — 99215 PR OFFICE/OUTPT VISIT, EST, LEVL V, 40-54 MIN: ICD-10-PCS | Mod: S$PBB,,, | Performed by: FAMILY MEDICINE

## 2019-06-11 PROCEDURE — 99215 OFFICE O/P EST HI 40 MIN: CPT | Mod: S$PBB,,, | Performed by: FAMILY MEDICINE

## 2019-06-11 PROCEDURE — 99214 OFFICE O/P EST MOD 30 MIN: CPT | Mod: PBBFAC,PO | Performed by: FAMILY MEDICINE

## 2019-06-11 PROCEDURE — 99999 PR PBB SHADOW E&M-EST. PATIENT-LVL IV: CPT | Mod: PBBFAC,,, | Performed by: FAMILY MEDICINE

## 2019-06-11 RX ORDER — PRENATAL VIT 91/IRON/FOLIC/DHA 28-975-200
COMBINATION PACKAGE (EA) ORAL 2 TIMES DAILY
Qty: 30 G | Refills: 5 | Status: SHIPPED | OUTPATIENT
Start: 2019-06-11 | End: 2020-07-08 | Stop reason: SDUPTHER

## 2019-06-11 RX ORDER — CLOTRIMAZOLE 1 G/ML
SOLUTION TOPICAL
Refills: 2 | COMMUNITY
Start: 2019-06-01 | End: 2021-04-16

## 2019-06-11 RX ORDER — AMLODIPINE BESYLATE 5 MG/1
5 TABLET ORAL DAILY
Qty: 30 TABLET | Refills: 5 | Status: SHIPPED | OUTPATIENT
Start: 2019-06-11 | End: 2019-07-09 | Stop reason: SDUPTHER

## 2019-06-11 RX ORDER — SULFAMETHOXAZOLE AND TRIMETHOPRIM 800; 160 MG/1; MG/1
1 TABLET ORAL 2 TIMES DAILY
Qty: 10 TABLET | Refills: 0 | Status: SHIPPED | OUTPATIENT
Start: 2019-06-11 | End: 2019-06-16

## 2019-06-11 RX ORDER — OXYCODONE AND ACETAMINOPHEN 10; 325 MG/1; MG/1
1 TABLET ORAL 3 TIMES DAILY PRN
Qty: 90 TABLET | Refills: 0 | Status: SHIPPED | OUTPATIENT
Start: 2019-06-11 | End: 2019-07-11

## 2019-06-11 RX ORDER — OXYCODONE AND ACETAMINOPHEN 10; 325 MG/1; MG/1
1 TABLET ORAL 3 TIMES DAILY PRN
Qty: 90 TABLET | Refills: 0 | Status: SHIPPED | OUTPATIENT
Start: 2019-07-09 | End: 2019-08-08

## 2019-06-11 RX ORDER — OXYCODONE AND ACETAMINOPHEN 10; 325 MG/1; MG/1
1 TABLET ORAL 3 TIMES DAILY PRN
Qty: 90 TABLET | Refills: 0 | Status: SHIPPED | OUTPATIENT
Start: 2019-08-06 | End: 2019-09-03 | Stop reason: SDUPTHER

## 2019-06-11 NOTE — PROGRESS NOTES
Chief Complaint   Patient presents with    Hypertension     3 months follow up     degeneration of Lumbar disc    Medication Refill       Beau Ferraro is a 48 y.o. male who presents per the Chief Complaint.  Pt is known to me and was last seen by me on 3/8/2019.  All known chronic medical issues have been documented.       Hypertension   This is a new problem. The current episode started more than 1 month ago. The problem is unchanged. The problem is uncontrolled. Pertinent negatives include no anxiety, blurred vision, chest pain, headaches, malaise/fatigue, neck pain, orthopnea, palpitations, peripheral edema, PND, shortness of breath or sweats. There are no associated agents to hypertension. Risk factors for coronary artery disease include male gender and sedentary lifestyle. Past treatments include nothing. Compliance problems include exercise and psychosocial issues.  There is no history of angina, kidney disease, CAD/MI, CVA, heart failure, left ventricular hypertrophy, PVD or retinopathy. There is no history of chronic renal disease, coarctation of the aorta, hyperaldosteronism, hypercortisolism, hyperparathyroidism, a hypertension causing med, pheochromocytoma, renovascular disease, sleep apnea or a thyroid problem.   Back Pain   This is a chronic problem. The current episode started more than 1 year ago. The problem occurs daily. The problem is unchanged. The pain is present in the lumbar spine. The quality of the pain is described as aching. The pain does not radiate. The pain is at a severity of 7/10. The pain is moderate. The pain is the same all the time. The symptoms are aggravated by bending, position, standing and lying down. Stiffness is present all day. Associated symptoms include leg pain, paresis and tingling. Pertinent negatives include no abdominal pain, bladder incontinence, bowel incontinence, chest pain, dysuria, fever, headaches, numbness, paresthesias, pelvic pain, perianal numbness,  weakness or weight loss. Risk factors include lack of exercise and poor posture. He has tried analgesics and muscle relaxant for the symptoms. The treatment provided moderate relief.   Arthritis   Presents for follow-up visit. He complains of pain and stiffness. He reports no joint swelling or joint warmth. The symptoms have been stable. Affected locations include the neck, right hip, left hip, right knee, left knee, right ankle, left ankle, right foot, left foot, right wrist, left wrist, right elbow, left elbow, right shoulder and left shoulder. His pain is at a severity of 9/10. Associated symptoms include dry eyes, pain at night, pain while resting, rash (on face and behind ears) and uveitis. Pertinent negatives include no diarrhea, dry mouth, dysuria, fatigue, fever, Raynaud's syndrome or weight loss. Side effects of treatment include joint pain and limb pain.   Chronic Pain  Past Medical History includes: rheumatoid arthritis and chronic pain syndrome. Patient states that the pain is chronic. Beau describes pain involving the cervical spine, lumbar spine, thoracic spine, knee and hip. The onset of his pain began more than 1 year ago. The frequency of pain is described as constantly. Progression of pain since onset is waxing and waning. Patient describes pain as a 8/10. Beau is currently treating his symptoms with oxycodone/acetaminophen (Percocet) and tramadol. Patient has shown moderate improvement with current treatment.  Goals of therapy include: Improve ADL's. Beau has previously tried hydrocodone/acetaminophen (Hycet, Lorcet, Lortab, Norco,Vicodin), Nerve Block, NSAIDs, Gabapentin (Neurontin) and oxycodone/acetaminophen (Percocet) to treat his pain. Associated symptoms include: leg pain, paresis, tingling and ADLs performed poorly. Previous Imaging studies include: MRI Scan and X-Ray.  Patient has not had a drug screen. Patient does have a pain contract. Prescription Monitoring Program has been  reviewed.. Patient's history of substance abuse includes: none. Patient's psychiatric disorders include: none.       ROS  Review of Systems   Constitutional: Negative.  Negative for activity change, appetite change, chills, diaphoresis, fatigue, fever, malaise/fatigue, unexpected weight change and weight loss.   HENT: Negative.  Negative for congestion, ear pain, hearing loss, nosebleeds, postnasal drip, rhinorrhea, sinus pressure, sneezing, sore throat and trouble swallowing.    Eyes: Negative for blurred vision, pain and visual disturbance.   Respiratory: Negative for apnea, cough, choking, chest tightness and shortness of breath.    Cardiovascular: Negative for chest pain, palpitations, orthopnea, leg swelling and PND.   Gastrointestinal: Negative for abdominal distention, abdominal pain, anal bleeding, blood in stool, bowel incontinence, constipation, diarrhea, nausea, rectal pain and vomiting.   Genitourinary: Negative for bladder incontinence, difficulty urinating, dysuria, flank pain, frequency, pelvic pain, penile pain, penile swelling, scrotal swelling, testicular pain and urgency.   Musculoskeletal: Positive for arthralgias (right hand; most joints), arthritis, back pain, gait problem, myalgias, neck stiffness and stiffness. Negative for joint swelling and neck pain.   Skin: Positive for rash (on face and behind ears) and wound (under right arm pit). Negative for color change and pallor.   Allergic/Immunologic: Negative for environmental allergies, food allergies and immunocompromised state.   Neurological: Positive for tingling. Negative for dizziness, seizures, syncope, weakness, light-headedness, numbness, headaches and paresthesias.   Psychiatric/Behavioral: Positive for dysphoric mood. Negative for confusion, decreased concentration and sleep disturbance. The patient is nervous/anxious.        Physical Exam  Vitals:    06/11/19 1025   BP: 120/80   Pulse: 74   Resp: 17   Temp: 97.9 °F (36.6 °C)    Body  "mass index is 27.02 kg/m².  Weight: 80.6 kg (177 lb 11.1 oz)   Height: 5' 8" (172.7 cm)     Physical Exam   Constitutional: He is oriented to person, place, and time. He appears well-developed and well-nourished. He is active and cooperative.  Non-toxic appearance. He does not have a sickly appearance. He does not appear ill. No distress.   HENT:   Head: Normocephalic and atraumatic.   Right Ear: Hearing, tympanic membrane, external ear and ear canal normal. No tenderness. No foreign bodies. No mastoid tenderness. Tympanic membrane is not injected, not scarred, not perforated, not erythematous, not retracted and not bulging. No hemotympanum. No decreased hearing is noted.   Left Ear: Hearing, tympanic membrane, external ear and ear canal normal. No tenderness. No foreign bodies. No mastoid tenderness. Tympanic membrane is not injected, not scarred, not perforated, not erythematous, not retracted and not bulging. No hemotympanum. No decreased hearing is noted.   Nose: Nose normal. No rhinorrhea, sinus tenderness, nasal deformity or septal deviation. No epistaxis.  No foreign bodies.   Mouth/Throat: Uvula is midline, oropharynx is clear and moist and mucous membranes are normal. He does not have dentures. Normal dentition. No uvula swelling or dental caries. No oropharyngeal exudate, posterior oropharyngeal edema, posterior oropharyngeal erythema or tonsillar abscesses.   Eyes: Pupils are equal, round, and reactive to light. Conjunctivae, EOM and lids are normal. Right eye exhibits no chemosis, no discharge, no exudate and no hordeolum. No foreign body present in the right eye. Left eye exhibits no chemosis, no discharge, no exudate and no hordeolum. No foreign body present in the left eye. No scleral icterus.   Neck: Normal range of motion and full passive range of motion without pain. Neck supple. No thyroid mass and no thyromegaly present.   Cardiovascular: Normal rate, regular rhythm, S1 normal, S2 normal and " normal heart sounds. Exam reveals no gallop and no friction rub.   No murmur heard.  Pulmonary/Chest: Effort normal and breath sounds normal. No accessory muscle usage. No respiratory distress. He has no decreased breath sounds. He has no wheezes. He has no rhonchi. He has no rales.   Abdominal: Soft. Normal appearance and bowel sounds are normal. He exhibits no distension, no ascites and no mass. There is no hepatosplenomegaly. There is no tenderness. There is no rigidity, no rebound and no guarding. No hernia.   Musculoskeletal:        Right hip: He exhibits tenderness and bony tenderness. He exhibits normal range of motion, normal strength, no swelling, no crepitus, no deformity and no laceration.        Left hip: He exhibits tenderness and bony tenderness. He exhibits normal range of motion, normal strength, no swelling, no crepitus, no deformity and no laceration.        Cervical back: He exhibits decreased range of motion and pain. He exhibits no tenderness, no bony tenderness, no swelling, no edema, no deformity, no laceration, no spasm and normal pulse.        Lumbar back: He exhibits decreased range of motion, bony tenderness and pain. He exhibits no tenderness, no swelling, no edema, no deformity, no laceration, no spasm and normal pulse.        Right hand: He exhibits decreased range of motion, tenderness, bony tenderness, deformity and swelling. He exhibits normal two-point discrimination, normal capillary refill and no laceration. Normal sensation noted. Decreased sensation is not present in the ulnar distribution and is not present in the medial distribution. Decreased strength noted. He exhibits finger abduction and thumb/finger opposition. He exhibits no wrist extension trouble.        Right foot: There is decreased range of motion, tenderness and swelling. There is no bony tenderness, normal capillary refill, no crepitus, no deformity and no laceration.        Left foot: There is decreased range of  motion, tenderness and swelling. There is no bony tenderness, normal capillary refill, no crepitus, no deformity and no laceration.   Lymphadenopathy:        Head (right side): No submental, no submandibular, no preauricular and no posterior auricular adenopathy present.        Head (left side): No submental, no submandibular, no preauricular and no posterior auricular adenopathy present.     He has no cervical adenopathy.   Neurological: He is alert and oriented to person, place, and time. He has normal strength. He displays no atrophy and no tremor. No cranial nerve deficit or sensory deficit. He exhibits normal muscle tone. He displays no seizure activity. Coordination and gait normal.   Skin: Skin is warm, dry and intact. No rash noted. He is not diaphoretic. No erythema. No pallor.   Covered in multiple tattoos over face, torso, and arms.   Psychiatric: He has a normal mood and affect. His speech is normal and behavior is normal. Judgment and thought content normal. His mood appears not anxious. His affect is not angry, not blunt, not labile and not inappropriate. Cognition and memory are normal. He does not exhibit a depressed mood. He is attentive.   Vitals reviewed.      Assessment & Plan    Discussion of plan of care including treatment options regarding health and wellness were reviewed and discussed with patient.  Any changes to medication or treatment plan, as well as any screening blood test, imaging, or referrals to specialist, are documented.  Follow up as indicated.     1. Ankylosing spondylitis of multiple sites in spine  Discussed rules regarding chronic pain management with opiate/opioid therapy.  Discussed use of alternative medications to manage pain with goal of reducing and possibly discontinue opioid therapy, and advised that in order to continue current therapy they would need to schedule appointment at least once every 90 days, as well as consent to random urine drug screening.  The  legitimate medical purpose of using a controlled substance for pain management is AS and chronic pain.  Patient has been screened through the [facility] and is not receiving controlled substances from any other provider.  At this time, I have no suspicion of illegal activity and feel that with proper usage, there is low risk for overdose.  Patient is interested in meeting with Pain Specialist and [facility]ing [place] [facility]    - Ambulatory referral to Pain Clinic  - oxyCODONE-acetaminophen (PERCOCET)  mg per tablet; Take 1 tablet by mouth 3 (three) times daily as needed.  Dispense: 90 tablet; Refill: 0  - oxyCODONE-acetaminophen (PERCOCET)  mg per tablet; Take 1 tablet by mouth 3 (three) times daily as needed.  Dispense: 90 tablet; Refill: 0  - oxyCODONE-acetaminophen (PERCOCET)  mg per tablet; Take 1 tablet by mouth 3 (three) times daily as needed.  Dispense: 90 tablet; Refill: 0  - Ambulatory Referral to Physical/Occupational Therapy    2. Degeneration of lumbar or lumbosacral intervertebral disc  The current medical regimen is effective at this time and no changes to present plan or medications will be made at this visit.     - Ambulatory referral to Pain Clinic  - oxyCODONE-acetaminophen (PERCOCET)  mg per tablet; Take 1 tablet by mouth 3 (three) times daily as needed.  Dispense: 90 tablet; Refill: 0  - oxyCODONE-acetaminophen (PERCOCET)  mg per tablet; Take 1 tablet by mouth 3 (three) times daily as needed.  Dispense: 90 tablet; Refill: 0  - oxyCODONE-acetaminophen (PERCOCET)  mg per tablet; Take 1 tablet by mouth 3 (three) times daily as needed.  Dispense: 90 tablet; Refill: 0  - Ambulatory Referral to Physical/Occupational Therapy    3. Essential hypertension  Patient was counseled and encouraged to maintain a low sodium diet, as well as increasing physical activity.  Recommend random BP checks at home on a regular basis.  Repeat BP at end of visit was not necessary.  Will continue medication at this time, and follow up in 3-6 months, or sooner if blood pressure begins to increase.     - amLODIPine (NORVASC) 5 MG tablet; Take 1 tablet (5 mg total) by mouth once daily.  Dispense: 30 tablet; Refill: 5    4. Inflammatory polyarthritis  Referral to appropriate specialist for evaluation and management placed in [facility].   - Ambulatory referral to Pain Clinic  - Ambulatory Referral to Physical/Occupational Therapy    5. Axillary abscess  Patient was advised that area appears infected, and that antibiotics will be prescribed.  Advised not to squeeze in order to force discharge, but rather apply warm to hot compresses and/or a baking soda based paste or OTC therapy to area to assist in drainage.  Pain can be managed with over-the-counter pain medication, and the area should remain as clean and dry as possible.   - sulfamethoxazole-trimethoprim 800-160mg (BACTRIM DS) 800-160 mg Tab; Take 1 tablet by mouth 2 (two) times daily. for 5 days  Dispense: 10 tablet; Refill: 0    6. Fungal dermatitis  Medication prescribed for use only as symptoms require.   - terbinafine HCl (LAMISIL AT) 1 % cream; Apply topically 2 (two) times daily.  Dispense: 30 g; Refill: 5    7. Steroid-induced osteoporosis  Managed by Rheumatology; bone density test planned later this year.         Follow up in about 3 months (around 9/11/2019), or if symptoms worsen or fail to improve.        ACTIVE MEDICAL ISSUES:  Documented in Problem List    PAST MEDICAL HISTORY  Documented    PAST SURGICAL HISTORY:  Documented    SOCIAL HISTORY:  Documented    FAMILY HISTORY:  Documented    ALLERGIES AND MEDICATIONS: updated and reviewed.  Documented    Health Maintenance       Date Due Completion Date    Lipid Panel 06/20/2019 6/20/2018    Influenza Vaccine 08/01/2019 9/21/2018    Override on 10/12/2017: Declined    TETANUS VACCINE 04/10/2027 4/10/2017 (Done)    Override on 4/10/2017: Done ([facility] - ED visit following dog bite)

## 2019-06-12 DIAGNOSIS — Z79.60 LONG-TERM USE OF IMMUNOSUPPRESSANT MEDICATION: ICD-10-CM

## 2019-06-12 DIAGNOSIS — M06.4 INFLAMMATORY POLYARTHRITIS: ICD-10-CM

## 2019-06-12 DIAGNOSIS — M45.0 ANKYLOSING SPONDYLITIS OF MULTIPLE SITES IN SPINE: ICD-10-CM

## 2019-06-12 DIAGNOSIS — H20.021 IRITIS, RECURRENT, RIGHT: ICD-10-CM

## 2019-06-12 DIAGNOSIS — M47.899 HLA-B27 SPONDYLOARTHROPATHY: ICD-10-CM

## 2019-06-12 RX ORDER — ADALIMUMAB 40MG/0.8ML
KIT SUBCUTANEOUS
Qty: 12 PEN | Refills: 1 | Status: SHIPPED | OUTPATIENT
Start: 2019-06-12 | End: 2019-06-13 | Stop reason: CLARIF

## 2019-06-13 ENCOUNTER — TELEPHONE (OUTPATIENT)
Dept: PHARMACY | Facility: CLINIC | Age: 49
End: 2019-06-13

## 2019-06-13 RX ORDER — ADALIMUMAB 40MG/0.8ML
40 KIT SUBCUTANEOUS
Qty: 12 PEN | Refills: 2 | Status: SHIPPED | OUTPATIENT
Start: 2019-06-13 | End: 2019-09-12 | Stop reason: SDUPTHER

## 2019-06-13 NOTE — TELEPHONE ENCOUNTER
"----- Message from Angelic Garcia sent at 6/13/2019 10:47 AM CDT -----  Regarding: Humira   Good Morning     The patient would like to continue to fill his HUMIRA at [facility].  Please send a new prescription to [facility].     To complete this in EPIC, the original order MUST be discontinued and re-typed as a new prescription with the updated pharmacy listed. Clicking "reorder" will continue to route the Rx to [facility], even if the pharmacy is changed. Please opt the patient out of [facility] when the BPA is fired.    Thank you   Angelic   -[phone]  "

## 2019-06-13 NOTE — TELEPHONE ENCOUNTER
LVM for callback to inform patient that [facility] received prescription for Humira and benefits investigation is required.  [facility] will be back in touch once insurance determination is received.

## 2019-06-28 ENCOUNTER — CLINICAL SUPPORT (OUTPATIENT)
Dept: REHABILITATION | Facility: OTHER | Age: 49
End: 2019-06-28
Payer: MEDICARE

## 2019-06-28 DIAGNOSIS — R53.1 DECREASED STRENGTH, ENDURANCE, AND MOBILITY: ICD-10-CM

## 2019-06-28 DIAGNOSIS — G89.29 CHRONIC NECK AND BACK PAIN: ICD-10-CM

## 2019-06-28 DIAGNOSIS — Z74.09 DECREASED STRENGTH, ENDURANCE, AND MOBILITY: ICD-10-CM

## 2019-06-28 DIAGNOSIS — M54.9 CHRONIC NECK AND BACK PAIN: ICD-10-CM

## 2019-06-28 DIAGNOSIS — M79.603 ARM AND LEG PAIN: ICD-10-CM

## 2019-06-28 DIAGNOSIS — R29.3 POSTURAL IMBALANCE: ICD-10-CM

## 2019-06-28 DIAGNOSIS — M54.2 CHRONIC NECK AND BACK PAIN: ICD-10-CM

## 2019-06-28 DIAGNOSIS — R68.89 DECREASED STRENGTH, ENDURANCE, AND MOBILITY: ICD-10-CM

## 2019-06-28 DIAGNOSIS — M79.606 ARM AND LEG PAIN: ICD-10-CM

## 2019-06-28 PROCEDURE — 97110 THERAPEUTIC EXERCISES: CPT | Mod: PN

## 2019-06-28 PROCEDURE — G8979 MOBILITY GOAL STATUS: HCPCS | Mod: CK,PN

## 2019-06-28 PROCEDURE — 97163 PT EVAL HIGH COMPLEX 45 MIN: CPT | Mod: PN

## 2019-06-28 PROCEDURE — G8978 MOBILITY CURRENT STATUS: HCPCS | Mod: CL,PN

## 2019-06-28 NOTE — PLAN OF CARE
"[facility]  Physical Therapy Initial Evaluation    Name: Beau Ferraro  Clinic Number: [id number]    Therapy Diagnosis:   Encounter Diagnoses   Name Primary?    Chronic neck and back pain     Arm and leg pain     Postural imbalance     Decreased strength, endurance, and mobility      Physician: Lombard, Azikiwe K., MD    Physician Orders: PT Eval and Treat   Medical Diagnosis from Referral:   M45.0 (ICD-10-CM) - Ankylosing spondylitis of multiple sites in spine   M51.37 (ICD-10-CM) - Degeneration of lumbar or lumbosacral intervertebral disc   M06.4 (ICD-10-CM) - Inflammatory polyarthritis     Evaluation Date: 6/28/2019  Authorization Period Expiration: 06/10/2020  Plan of Care Expiration: 09/28/2019  Visit # / Visits authorized: 1/ 1    Time In: 10:15 -- late for appt  Time Out: 11:00  Total Billable Time: 45 minutes    Precautions: Standard and RA with multi joint arthritis, ankylosing spondylitis, osteopororsis    Subjective   Date of onset: 20+ years  History of current condition - Beau reports: long chronic Hx of RA but most recent flare up was 3 months ago without BARAK or trauma. Last flare up was 2009. He notes pain in multiple joints today - most painful is neck and back, with lesser pain in peripheral joints including hands, hips, knees, and feet bilaterally.     Neck and back: Has a Hx of cervical and lumbar injury and pain following a fall in 2008. Gradual worsening of trunk flex due to stiffness, weakness and pain, that contributes to worsening of ankylosing spondylitis. Back and neck pain are daily and constant. Denies radiating, radicular pain, B/B changes, or saddle paresthesias. Says that he did PT in 2018 at [facility] to reduce pain but says that he stopped exercising once he completed his exercises due to fear of "making things worse, or so my doctor says." Wears a back brace to help with posture. Hopes to strengthening and straighten his spine to prevent fusing in a " flexed position.    Peripheral joints: chronic pain that comes and goes. Current pain is described as an intermittent ache. Denies joint swellign and warmth. Notes worsening symptoms with RA flare up or with weather changes. When he is having a flare up he notes pain with all activities. R hand dominant.      Medical History:   Past Medical History:   Diagnosis Date    Acid reflux     Allergy     Anemia     Arthritis     Blood transfusion     Crohn's disease     Hyperlipidemia     Osteoporosis        Surgical History:   Beau Ferraro  has a past surgical history that includes Cholecystectomy.    Medications:   Beau has a current medication list which includes the following prescription(s): humira pen, amlodipine, back brace, clonazepam, clonazepam, clonazepam, clotrimazole, econazole nitrate, famotidine, omeprazole, oxycodone-acetaminophen, oxycodone-acetaminophen, oxycodone-acetaminophen, pravastatin, and terbinafine hcl.    Allergies:   Review of patient's allergies indicates:   Allergen Reactions    Ampicillin Rash     Other reaction(s): Rash        Imaging, Xray of LSP 06/2018: Old fractures are seen at T12, L1, L2, L3 and L4 superior endplates, with the most severe fracture at L1 where there is focal kyphosis and and anterior wedge type configuration of the L1 body.  This appears similar to the prior radiographs.  No new acute fracture is seen.  Multilevel degenerative disc disease is present.  There may be fusion of the posterior elements at multiple levels.    Prior Therapy: yes - at [place] 2018 - 3-4 months, but stopped doing them a few months after discharged  Social History: lives alone, H  Occupation: not working - disabled  Prior Level of Function: Mod I with use of 1 axillary crutch and back brace  Current Level of Function: pain and difficulty with all functional activities - poor tolerance with prolonged activities due to pain    Pain:  Current 9/10, worst 10/10, best 5/10   Location:  bilateral ankles, back , feet , hands , knee , neck  and hips   Description: constant dull ahce  Aggravating Factors: Sitting, Standing, Eating, Morning, Extension, Flexing, Lifting, Getting out of bed/chair, bending, lying down, HHCs, and ADLs  Easing Factors: pain medication, hot baths, RA injections, medicated rubs    Pts goals: reduce pain and be able to stand more upright    Objective     Posture Alignment: dons back brace with increased trunk flex, increased hip flex, knee flex contractures    DTR's: 1+  Dermatomes: Sensation: Light Touch: Impaired: mild    CERVICAL SPINE AROM:   Flexion: WNL, posterior neck pain   Extension: WNL, endrange pain   Left Sidebend: Max driver, stiffness/pain in R neck   Right Sidebend: Max driver, stiffness/pain in L neck   Left Rotation: 45 deg, endrange pain   Right Rotation: <40 deg, endrange pain     Shoulder ROM: WNL, crepitus noted lubna  Elbow ROM: flex = min driver, ext = WNL  Wrist ROM: mod/max driver flex/ext  Hands ROM: mod driver all directions    UPPER EXTREMITY STRENGTH:   Left Right   Shoulder Flexion 4/5 4/5   Shoulder Abduction 4+/5 4+/5   Shoulder Internal Rotation NT NT   Shoulder External Rotation 4/5 4/5   Elbow Flexion  5/5 5/5   Elbow Extension 5/5 5/5   Wrist Flexion 5/5 5/5   Wrist Extension 5/5 5/5    4/5 - limited by pain 4/5 - limited by pain       Scapular Strength: poor    Flexibility: Pectoralis Major, Minor = poor, lats = poor  Joint Mobility: severe Hypo    Special Tests:    Clonus: -  Vertebral artery test: -  Alar ligament integrity test: -    Thoracic/Lumbar AROM: Pain/Dysfunction with Movement:   Flexion WNL, endrange pain   Extension Max driver, UA to achieve neutral spine, remains flexed >20 deg. Hip/knee flex contractures   Right side bending Mod driver, painful   Left side bending Mod driver, painful   Right rotation Mod-max driver, painful   Left rotation Mod-max driver, painful     Trunk strength: poor  Hip ROM: Mod driver ER/IR, Max drivre hip ext  Knee ROM: Mod driver  "ext  Ankle ROM: mod driver all directions    Lower Extremity Strength  Right LE  Left LE    Hip flexion: 4/5 Hip flexion: 4/5   Hip extension:  4/5 Hip extension: 4/5   Hip abduction: 4/5 Hip abduction: 4/5   Hip adduction:  4/5 Hip adduction:  4/5   Knee Flexion 5/5 Knee Flexion 5/5   Knee Extension 5/5 Knee Extension 5/5   Ankle dorsiflexion: 5/5 Ankle dorsiflexion: 5/5   Ankle plantarflexion: 5/5 Ankle plantarflexion: 5/5     Flexibility:   Tutu test   Hip flexors: poor   Quads: poor  Amy test   ITB: fair  Hamstring (SLR): poor    Joint Mobility: severe hypo    Special Tests:   Test Name  Test Result   Straight Leg Raise (--)   Neural Tension Test (Slump) (--)   Crossed Straight Leg Raise (--)     Functional Movement Analysis:   Gait: Mod I x axillary crutch, dons backbrace, increased trunk flex  Transfers: 30" STS 10x   Bed mobility: mod I  Balance: L <3" before LOB, R 9" before LOB    CMS Impairment/Limitation/Restriction for FOTO lumbar spine Survey    Therapist reviewed FOTO scores for Beau Ferraro on 6/28/2019.   FOTO documents entered into [employer] - see Media section.    Limitation Score: 70%  Category: Mobility    Current : CL = least 60% but < 80% impaired, limited or restricted  Goal: CK = at least 40% but < 60% impaired, limited or restricted  Discharge: N/a         TREATMENT   Treatment Time In: 10:50  Treatment Time Out: 11:00  Total Treatment time separate from Evaluation: 10 minutes    Beau received therapeutic exercises to develop strength, endurance, ROM, flexibility, posture and core stabilization for 10 minutes including:  Seated AROM shoulder flex lubna 10x  scap squeezes 10x  Seated LAQ 10x 5" holds    Home Exercises and Patient Education Provided    Education provided:   - - Patient educated regarding pathophysiology, pathogenesis, diagnosis, protocol, prognosis, POC, and HEP. Written Home Exercises Provided with written and verbal instructions for frequency and duration of the following " exercises: seated LAQ, scap squeezes, OH shoulder flex . Pt educated on HEP and activity modifications to reduce c/o pain and improve overall function.   - Pt was educated in posture and body mechanics.  Use of a lumbar roll was recommended and demonstrated here today.  Purchase information provided.   - Pt also educated on use of modalities prn to reduce c/o pain and dysfunction.       Written Home Exercises Provided: yes.  Exercises were reviewed and Beau was able to demonstrate them prior to the end of the session.  Beau demonstrated good  understanding of the education provided.     See EMR under Media for exercises provided 6/28/2019.    Assessment   Beau is a 49 y.o. male referred to outpatient Physical Therapy with a medical diagnosis of multiple diagnoses of back, neck, and multi joint pain. Pt presents with global limitations in ROM, flexibility, and strength which is facilitated by multiple medical diagnoses. Impairments limit all functional activities.    Pt prognosis is Good.   Pt will benefit from skilled outpatient Physical Therapy to address the deficits stated above and in the chart below, provide pt/family education, and to maximize pt's level of independence.     Plan of care discussed with patient: Yes  Pt's spiritual, cultural and educational needs considered and patient is agreeable to the plan of care and goals as stated below:     Anticipated Barriers for therapy: multiple medical diagnoses, financial considerations, transportation, chronicity of symptoms    Medical Necessity is demonstrated by the following  History  Co-morbidities and personal factors that may impact the plan of care Co-morbidities:   anxiety, coping style/mechanism, difficulty sleeping, education level, excessive commute time/distance, financial considerations, level of undertstanding of current condition, poor medication/medical compliance, transportation assistance required and ankylosing spondylitis,  osteoporosis    Personal Factors:   age  education level  coping style  social background  lifestyle     high   Examination  Body Structures and Functions, activity limitations and participation restrictions that may impact the plan of care Body Regions:   head  neck  back  lower extremities  upper extremities  trunk    Body Systems:    gross symmetry  ROM  strength  gross coordinated movement  balance  gait  transfers  transitions  motor control  motor learning  posture, joint mobility, flexibility    Participation Restrictions:   ADLs, HHCs driving, sleeping    Activity limitations:   Learning and applying knowledge  no deficits    General Tasks and Commands  no deficits    Communication  no deficits    Mobility  lifting and carrying objects  fine hand use (grasping/picking up)  walking  driving (bike, car, motorcycle)    Self care  washing oneself (bathing, drying, washing hands)  caring for body parts (brushing teeth, shaving, grooming)  toileting  dressing  eating  drinking  looking after one's health    Domestic Life  shopping  cooking  doing house work (cleaning house, washing dishes, laundry)  assisting others    Interactions/Relationships  no deficits    Life Areas  no deficits    Community and Social Life  community life  recreation and leisure  Temple and spirituality         high   Clinical Presentation unstable clinical presentation with unpredictable characteristics high   Decision Making/ Complexity Score: high     Goals:  Short Term Goals: 6 weeks   1. Pt able to stand and do HHCs with Max-Mod difficulty.  2. Pt able to perform sit and perform dressing activities, such as donning shoes/socks, with max-mod difficulty.  3. Pt able to stand while performing self care with max-mod difficulty.  4. Pt able to sit while driving for 1 hour with max-mod difficulty.  5. Pt to demonstrate improved functional mobility with <=60% disability.    Long Term Goals: 12 weeks   1. Pt able to stand and do HHCs with  Mod-Min difficulty.  2. Pt able to perform sit and perform dressing activities, such as donning shoes/socks, with mod-min difficulty.  3. Pt able to stand while performing self care with mod-min difficulty.  4. Pt able to sit while driving for 1 hour with mod-min difficulty.  5. Pt to demonstrate improved functional mobility with <=50% disability.    Plan   Plan of care Certification: 6/28/2019 to 09/28/2019.    Outpatient Physical Therapy 2 times weekly for 12 weeks to include the following interventions: Aquatic Therapy, Electrical Stimulation prn, Gait Training, Iontophoresis (with dexamethasone prn), Manual Therapy, Moist Heat/ Ice, Neuromuscular Re-ed, Orthotic Management and Training, Patient Education, Self Care, Therapeutic Activites, Therapeutic Exercise and IASTYM, therapeutic taping, dry needling. Progress HEP towards D/C. Recommend F/U with MD if symptoms worsen or do not resolve. Patient may be seen by a PTA for treatment to carry out their plan of care.  Face-to-face conferences will be held.        Krystina Villa, PT

## 2019-07-03 ENCOUNTER — TELEPHONE (OUTPATIENT)
Dept: FAMILY MEDICINE | Facility: CLINIC | Age: 49
End: 2019-07-03

## 2019-07-03 NOTE — LETTER
July 3, 2019    [facility]au Ferraro  [address] [facility] [place] [address]             [facility]  [address]  [place] [address]  Phone: [phone]  Fax: [phone] Dear Mr. Ferraro:    Sorry we were unable to contact you to schedule your Pain Medicine appointment. Please give the referral department a call at [phone].        If you have any questions or concerns, please don't hesitate to call.    Sincerely,        Javier Shi MA

## 2019-07-09 DIAGNOSIS — I10 ESSENTIAL HYPERTENSION: ICD-10-CM

## 2019-07-09 RX ORDER — AMLODIPINE BESYLATE 5 MG/1
5 TABLET ORAL DAILY
Qty: 30 TABLET | Refills: 5 | Status: SHIPPED | OUTPATIENT
Start: 2019-07-09 | End: 2019-12-03 | Stop reason: SDUPTHER

## 2019-07-11 ENCOUNTER — DOCUMENTATION ONLY (OUTPATIENT)
Dept: REHABILITATION | Facility: HOSPITAL | Age: 49
End: 2019-07-11

## 2019-07-16 ENCOUNTER — DOCUMENTATION ONLY (OUTPATIENT)
Dept: REHABILITATION | Facility: OTHER | Age: 49
End: 2019-07-16

## 2019-08-15 ENCOUNTER — DOCUMENTATION ONLY (OUTPATIENT)
Dept: REHABILITATION | Facility: OTHER | Age: 49
End: 2019-08-15

## 2019-08-15 DIAGNOSIS — M54.2 CHRONIC NECK AND BACK PAIN: ICD-10-CM

## 2019-08-15 DIAGNOSIS — R53.1 DECREASED STRENGTH, ENDURANCE, AND MOBILITY: ICD-10-CM

## 2019-08-15 DIAGNOSIS — M79.603 ARM AND LEG PAIN: ICD-10-CM

## 2019-08-15 DIAGNOSIS — G89.29 CHRONIC NECK AND BACK PAIN: ICD-10-CM

## 2019-08-15 DIAGNOSIS — Z74.09 DECREASED STRENGTH, ENDURANCE, AND MOBILITY: ICD-10-CM

## 2019-08-15 DIAGNOSIS — R68.89 DECREASED STRENGTH, ENDURANCE, AND MOBILITY: ICD-10-CM

## 2019-08-15 DIAGNOSIS — M79.606 ARM AND LEG PAIN: ICD-10-CM

## 2019-08-15 DIAGNOSIS — M54.9 CHRONIC NECK AND BACK PAIN: ICD-10-CM

## 2019-08-15 DIAGNOSIS — R29.3 POSTURAL IMBALANCE: ICD-10-CM

## 2019-08-15 NOTE — PROGRESS NOTES
[facility] OUTPATIENT DISCHARGE SUMMARY  Physical Therapy      Name:  Beau Ferraro  Date:  8/15/2019    Date of Evaluation:  6/28/19  Physician: No ref. provider found  Total # Of Visits:  1   Diagnosis:    Encounter Diagnoses   Name Primary?    Chronic neck and back pain     Arm and leg pain     Postural imbalance     Decreased strength, endurance, and mobility          Physical/Functional Status:  Unable to assess pt's functional limitations and current impairments due to pt not returning to the clinic.     The patient is to be discharged from our Therapy service for the following reason(s):  Patient has not attended therapy since 6/28/19 - initial evaluation    Degree of Goal Achievement: Patient has not met goals secondary to:  Not returning to clinic for follow up assessment.    Patient Education: None provided    Discharge Plan:  D/C due to non-compliance for remaining visit.    Krystina Villa, PT  8/15/2019

## 2019-09-03 ENCOUNTER — OFFICE VISIT (OUTPATIENT)
Dept: FAMILY MEDICINE | Facility: CLINIC | Age: 49
End: 2019-09-03
Payer: MEDICARE

## 2019-09-03 VITALS
TEMPERATURE: 98 F | DIASTOLIC BLOOD PRESSURE: 82 MMHG | RESPIRATION RATE: 20 BRPM | HEIGHT: 68 IN | WEIGHT: 177.25 LBS | OXYGEN SATURATION: 98 % | HEART RATE: 72 BPM | BODY MASS INDEX: 26.86 KG/M2 | SYSTOLIC BLOOD PRESSURE: 120 MMHG

## 2019-09-03 DIAGNOSIS — L30.9 CHRONIC ECZEMA: ICD-10-CM

## 2019-09-03 DIAGNOSIS — K21.9 GASTROESOPHAGEAL REFLUX DISEASE WITHOUT ESOPHAGITIS: ICD-10-CM

## 2019-09-03 DIAGNOSIS — E55.9 VITAMIN D INSUFFICIENCY: ICD-10-CM

## 2019-09-03 DIAGNOSIS — M45.0 ANKYLOSING SPONDYLITIS OF MULTIPLE SITES IN SPINE: ICD-10-CM

## 2019-09-03 DIAGNOSIS — I10 ESSENTIAL HYPERTENSION: Primary | ICD-10-CM

## 2019-09-03 DIAGNOSIS — E78.5 HYPERLIPIDEMIA, ACQUIRED: ICD-10-CM

## 2019-09-03 DIAGNOSIS — M51.37 DEGENERATION OF LUMBAR OR LUMBOSACRAL INTERVERTEBRAL DISC: ICD-10-CM

## 2019-09-03 PROCEDURE — 99999 PR PBB SHADOW E&M-EST. PATIENT-LVL III: CPT | Mod: PBBFAC,,, | Performed by: FAMILY MEDICINE

## 2019-09-03 PROCEDURE — 99213 OFFICE O/P EST LOW 20 MIN: CPT | Mod: PBBFAC,PO | Performed by: FAMILY MEDICINE

## 2019-09-03 PROCEDURE — 99999 PR PBB SHADOW E&M-EST. PATIENT-LVL III: ICD-10-PCS | Mod: PBBFAC,,, | Performed by: FAMILY MEDICINE

## 2019-09-03 PROCEDURE — 99214 OFFICE O/P EST MOD 30 MIN: CPT | Mod: S$PBB,,, | Performed by: FAMILY MEDICINE

## 2019-09-03 PROCEDURE — 99214 PR OFFICE/OUTPT VISIT, EST, LEVL IV, 30-39 MIN: ICD-10-PCS | Mod: S$PBB,,, | Performed by: FAMILY MEDICINE

## 2019-09-03 RX ORDER — TRIAMCINOLONE ACETONIDE 5 MG/G
OINTMENT TOPICAL 2 TIMES DAILY
Qty: 30 G | Refills: 5 | Status: SHIPPED | OUTPATIENT
Start: 2019-09-03 | End: 2020-01-23 | Stop reason: SDUPTHER

## 2019-09-03 RX ORDER — OXYCODONE AND ACETAMINOPHEN 10; 325 MG/1; MG/1
1 TABLET ORAL 3 TIMES DAILY PRN
Qty: 90 TABLET | Refills: 0 | Status: SHIPPED | OUTPATIENT
Start: 2019-09-03 | End: 2019-10-03

## 2019-09-03 RX ORDER — OXYCODONE AND ACETAMINOPHEN 10; 325 MG/1; MG/1
1 TABLET ORAL 3 TIMES DAILY PRN
Qty: 90 TABLET | Refills: 0 | Status: SHIPPED | OUTPATIENT
Start: 2019-10-01 | End: 2019-10-31

## 2019-09-03 RX ORDER — FAMOTIDINE 40 MG/1
40 TABLET, FILM COATED ORAL EVERY 12 HOURS
Qty: 30 TABLET | Refills: 5 | Status: SHIPPED | OUTPATIENT
Start: 2019-09-03 | End: 2019-11-19 | Stop reason: SDUPTHER

## 2019-09-03 RX ORDER — OXYCODONE AND ACETAMINOPHEN 10; 325 MG/1; MG/1
1 TABLET ORAL 3 TIMES DAILY PRN
Qty: 90 TABLET | Refills: 0 | Status: SHIPPED | OUTPATIENT
Start: 2019-10-29 | End: 2019-11-29 | Stop reason: SDUPTHER

## 2019-09-03 NOTE — PROGRESS NOTES
Chief Complaint   Patient presents with    Ankylosing spondylitis of multiple sites in spine     follow up for refill       Beau Ferraro is a 49 y.o. male who presents per the Chief Complaint.  Pt is known to me and was last seen by me on 6/11/2019.  All known chronic medical issues have been documented.       Hypertension   This is a new problem. The current episode started more than 1 month ago. The problem is unchanged. The problem is uncontrolled. Pertinent negatives include no anxiety, blurred vision, chest pain, headaches, malaise/fatigue, neck pain, orthopnea, palpitations, peripheral edema, PND, shortness of breath or sweats. There are no associated agents to hypertension. Risk factors for coronary artery disease include male gender and sedentary lifestyle. Past treatments include nothing. Compliance problems include exercise and psychosocial issues.  There is no history of angina, kidney disease, CAD/MI, CVA, heart failure, left ventricular hypertrophy, PVD or retinopathy. There is no history of chronic renal disease, coarctation of the aorta, hyperaldosteronism, hypercortisolism, hyperparathyroidism, a hypertension causing med, pheochromocytoma, renovascular disease, sleep apnea or a thyroid problem.   Back Pain   This is a chronic problem. The current episode started more than 1 year ago. The problem occurs daily. The problem is unchanged. The pain is present in the lumbar spine. The quality of the pain is described as aching. The pain does not radiate. The pain is at a severity of 7/10. The pain is moderate. The pain is the same all the time. The symptoms are aggravated by bending, position, standing and lying down. Stiffness is present all day. Associated symptoms include leg pain, paresis and tingling. Pertinent negatives include no abdominal pain, bladder incontinence, bowel incontinence, chest pain, dysuria, fever, headaches, numbness, paresthesias, pelvic pain, perianal numbness, weakness or  weight loss. Risk factors include lack of exercise and poor posture. He has tried analgesics and muscle relaxant for the symptoms. The treatment provided moderate relief.   Arthritis   Presents for follow-up visit. He complains of pain and stiffness. He reports no joint swelling or joint warmth. The symptoms have been stable. Affected locations include the neck, right hip, left hip, right knee, left knee, right ankle, left ankle, right foot, left foot, right wrist, left wrist, right elbow, left elbow, right shoulder and left shoulder. His pain is at a severity of 9/10. Associated symptoms include dry eyes, pain at night, pain while resting, rash (on face and behind ears) and uveitis. Pertinent negatives include no diarrhea, dry mouth, dysuria, fatigue, fever, Raynaud's syndrome or weight loss. Side effects of treatment include joint pain and limb pain.   Chronic Pain  Past Medical History includes: rheumatoid arthritis and chronic pain syndrome. Patient states that the pain is chronic. Beau describes pain involving the cervical spine, lumbar spine, thoracic spine, knee and hip. The onset of his pain began more than 1 year ago. The frequency of pain is described as constantly. Progression of pain since onset is waxing and waning. Patient describes pain as a 8/10. Beau is currently treating his symptoms with oxycodone/acetaminophen (Percocet) and tramadol. Patient has shown moderate improvement with current treatment.  Goals of therapy include: Improve ADL's. Beau has previously tried hydrocodone/acetaminophen (Hycet, Lorcet, Lortab, Norco,Vicodin), Nerve Block, NSAIDs, Gabapentin (Neurontin) and oxycodone/acetaminophen (Percocet) to treat his pain. Associated symptoms include: leg pain, paresis, tingling and ADLs performed poorly. Previous Imaging studies include: MRI Scan and X-Ray.  Patient has not had a drug screen. Patient does have a pain contract. Prescription Monitoring Program has been reviewed..  Patient's history of substance abuse includes: none. Patient's psychiatric disorders include: none.       ROS  Review of Systems   Constitutional: Negative.  Negative for activity change, appetite change, chills, diaphoresis, fatigue, fever, malaise/fatigue, unexpected weight change and weight loss.   HENT: Negative.  Negative for congestion, ear pain, hearing loss, nosebleeds, postnasal drip, rhinorrhea, sinus pressure, sneezing, sore throat and trouble swallowing.    Eyes: Negative for blurred vision, pain and visual disturbance.   Respiratory: Negative for apnea, cough, choking, chest tightness and shortness of breath.    Cardiovascular: Negative for chest pain, palpitations, orthopnea, leg swelling and PND.   Gastrointestinal: Negative for abdominal distention, abdominal pain, anal bleeding, blood in stool, bowel incontinence, constipation, diarrhea, nausea, rectal pain and vomiting.   Genitourinary: Negative for bladder incontinence, difficulty urinating, dysuria, flank pain, frequency, pelvic pain, penile pain, penile swelling, scrotal swelling, testicular pain and urgency.   Musculoskeletal: Positive for arthralgias (right hand; most joints), arthritis, back pain, gait problem, myalgias, neck stiffness and stiffness. Negative for joint swelling and neck pain.   Skin: Positive for rash (on face and behind ears) and wound (under right arm pit). Negative for color change and pallor.   Allergic/Immunologic: Negative for environmental allergies, food allergies and immunocompromised state.   Neurological: Positive for tingling. Negative for dizziness, seizures, syncope, weakness, light-headedness, numbness, headaches and paresthesias.   Psychiatric/Behavioral: Positive for dysphoric mood. Negative for confusion, decreased concentration and sleep disturbance. The patient is nervous/anxious.        Physical Exam  Vitals:    09/03/19 1541   BP: 120/82   Pulse: 72   Resp: 20   Temp: 98.3 °F (36.8 °C)    Body mass index  "is 26.95 kg/m².  Weight: 80.4 kg (177 lb 4 oz)   Height: 5' 8" (172.7 cm)     Physical Exam   Constitutional: He is oriented to person, place, and time. He appears well-developed and well-nourished. He is active and cooperative.  Non-toxic appearance. He does not have a sickly appearance. He does not appear ill. No distress.   HENT:   Head: Normocephalic and atraumatic.   Right Ear: Hearing, tympanic membrane, external ear and ear canal normal. No tenderness. No foreign bodies. No mastoid tenderness. Tympanic membrane is not injected, not scarred, not perforated, not erythematous, not retracted and not bulging. No hemotympanum. No decreased hearing is noted.   Left Ear: Hearing, tympanic membrane, external ear and ear canal normal. No tenderness. No foreign bodies. No mastoid tenderness. Tympanic membrane is not injected, not scarred, not perforated, not erythematous, not retracted and not bulging. No hemotympanum. No decreased hearing is noted.   Nose: Nose normal. No rhinorrhea, sinus tenderness, nasal deformity or septal deviation. No epistaxis.  No foreign bodies.   Mouth/Throat: Uvula is midline, oropharynx is clear and moist and mucous membranes are normal. He does not have dentures. Normal dentition. No uvula swelling or dental caries. No oropharyngeal exudate, posterior oropharyngeal edema, posterior oropharyngeal erythema or tonsillar abscesses.   Eyes: Pupils are equal, round, and reactive to light. Conjunctivae, EOM and lids are normal. Right eye exhibits no chemosis, no discharge, no exudate and no hordeolum. No foreign body present in the right eye. Left eye exhibits no chemosis, no discharge, no exudate and no hordeolum. No foreign body present in the left eye. No scleral icterus.   Neck: Normal range of motion and full passive range of motion without pain. Neck supple. No thyroid mass and no thyromegaly present.   Cardiovascular: Normal rate, regular rhythm, S1 normal, S2 normal and normal heart " sounds. Exam reveals no gallop and no friction rub.   No murmur heard.  Pulmonary/Chest: Effort normal and breath sounds normal. No accessory muscle usage. No respiratory distress. He has no decreased breath sounds. He has no wheezes. He has no rhonchi. He has no rales.   Abdominal: Soft. Normal appearance and bowel sounds are normal. He exhibits no distension, no ascites and no mass. There is no hepatosplenomegaly. There is no tenderness. There is no rigidity, no rebound and no guarding. No hernia.   Musculoskeletal:        Right hip: He exhibits tenderness and bony tenderness. He exhibits normal range of motion, normal strength, no swelling, no crepitus, no deformity and no laceration.        Left hip: He exhibits tenderness and bony tenderness. He exhibits normal range of motion, normal strength, no swelling, no crepitus, no deformity and no laceration.        Cervical back: He exhibits decreased range of motion and pain. He exhibits no tenderness, no bony tenderness, no swelling, no edema, no deformity, no laceration, no spasm and normal pulse.        Lumbar back: He exhibits decreased range of motion, bony tenderness and pain. He exhibits no tenderness, no swelling, no edema, no deformity, no laceration, no spasm and normal pulse.        Right hand: He exhibits decreased range of motion, tenderness, bony tenderness, deformity and swelling. He exhibits normal two-point discrimination, normal capillary refill and no laceration. Normal sensation noted. Decreased sensation is not present in the ulnar distribution and is not present in the medial distribution. Decreased strength noted. He exhibits finger abduction and thumb/finger opposition. He exhibits no wrist extension trouble.        Right foot: There is decreased range of motion, tenderness and swelling. There is no bony tenderness, normal capillary refill, no crepitus, no deformity and no laceration.        Left foot: There is decreased range of motion,  tenderness and swelling. There is no bony tenderness, normal capillary refill, no crepitus, no deformity and no laceration.   Lymphadenopathy:        Head (right side): No submental, no submandibular, no preauricular and no posterior auricular adenopathy present.        Head (left side): No submental, no submandibular, no preauricular and no posterior auricular adenopathy present.     He has no cervical adenopathy.   Neurological: He is alert and oriented to person, place, and time. He has normal strength. He displays no atrophy and no tremor. No cranial nerve deficit or sensory deficit. He exhibits normal muscle tone. He displays no seizure activity. Coordination and gait normal.   Skin: Skin is warm, dry and intact. No rash noted. He is not diaphoretic. No erythema. No pallor.   Covered in multiple tattoos over face, torso, and arms.   Psychiatric: He has a normal mood and affect. His speech is normal and behavior is normal. Judgment and thought content normal. His mood appears not anxious. His affect is not angry, not blunt, not labile and not inappropriate. Cognition and memory are normal. He does not exhibit a depressed mood. He is attentive.   Vitals reviewed.      Assessment & Plan    Discussion of plan of care including treatment options regarding health and wellness were reviewed and discussed with patient.  Any changes to medication or treatment plan, as well as any screening blood test, imaging, or referrals to specialist, are documented.  Follow up as indicated.     1. Essential hypertension  Patient was counseled and encouraged to maintain a low sodium diet, as well as increasing physical activity.  Recommend random BP checks at home on a regular basis.  Repeat BP at end of visit was not necessary. Will continue medication at this time, and follow up in 3-6 months, or sooner if blood pressure begins to increase.     - Lipid panel; Future  - Comprehensive metabolic panel; Future    2. Ankylosing  spondylitis of multiple sites in spine  Discussed rules regarding chronic pain management with opiate/opioid therapy.  Discussed use of alternative medications to manage pain with goal of reducing and possibly discontinue opioid therapy, and advised that in order to continue current therapy they would need to schedule appointment at least once every 90 days, as well as consent to random urine drug screening.  The legitimate medical purpose of using a controlled substance for pain management is ankylosing spondylitis.  Patient has been screened through the [facility] and is not receiving controlled substances from any other provider.  At this time, I have no suspicion of illegal activity and feel that with proper usage, there is low risk for overdose.     - oxyCODONE-acetaminophen (PERCOCET)  mg per tablet; Take 1 tablet by mouth 3 (three) times daily as needed.  Dispense: 90 tablet; Refill: 0  - oxyCODONE-acetaminophen (PERCOCET)  mg per tablet; Take 1 tablet by mouth 3 (three) times daily as needed.  Dispense: 90 tablet; Refill: 0  - oxyCODONE-acetaminophen (PERCOCET)  mg per tablet; Take 1 tablet by mouth 3 (three) times daily as needed.  Dispense: 90 tablet; Refill: 0    3. Degeneration of lumbar or lumbosacral intervertebral disc  The current medical regimen is effective at this time and no changes to present plan or medications will be made at this visit.     - oxyCODONE-acetaminophen (PERCOCET)  mg per tablet; Take 1 tablet by mouth 3 (three) times daily as needed.  Dispense: 90 tablet; Refill: 0  - oxyCODONE-acetaminophen (PERCOCET)  mg per tablet; Take 1 tablet by mouth 3 (three) times daily as needed.  Dispense: 90 tablet; Refill: 0  - oxyCODONE-acetaminophen (PERCOCET)  mg per tablet; Take 1 tablet by mouth 3 (three) times daily as needed.  Dispense: 90 tablet; Refill: 0    4. Hyperlipidemia, acquired  Stable; no therapeutic changes at this time.     5. Chronic  eczema  Recommended skin protection including long sleeves and sunscreen to avoid overexposure to the sun.  Use of topical steroids supported and advised cold compress to area to reduce inflammation and itching.  Avoid scratching or excessive rubbing affected area.    - triamcinolone (KENALOG) 0.5 % ointment; Apply topically 2 (two) times daily.  Dispense: 30 g; Refill: 5    6. Gastroesophageal reflux disease without esophagitis  Patient was advised to continue medication and to decrease intake of caffeine and spicy foods and to elevate head while lying down.  Also advised to avoid lying down within 3 hours of last meal.  If symptoms continue with treatment, follow up for further evaluation.        7. Vitamin D insufficiency  Screening test will be ordered and once results available patient will be notified of results and managed accordingly.   - Vitamin D; Future     Follow up in about 3 months (around 12/3/2019), or if symptoms worsen or fail to improve.        ACTIVE MEDICAL ISSUES:  Documented in Problem List    PAST MEDICAL HISTORY  Documented    PAST SURGICAL HISTORY:  Documented    SOCIAL HISTORY:  Documented    FAMILY HISTORY:  Documented    ALLERGIES AND MEDICATIONS: updated and reviewed.  Documented    Health Maintenance       Date Due Completion Date    TETANUS VACCINE 06/23/1988 ---    Sign Pain Contract 06/23/1988 ---    Naloxone Prescription 06/23/1988 ---    Lipid Panel 06/20/2019 6/20/2018    Influenza Vaccine (1) 09/01/2019 9/21/2018    Urine Drug Screen 09/08/2019 3/8/2019

## 2019-09-12 DIAGNOSIS — M06.4 INFLAMMATORY POLYARTHRITIS: ICD-10-CM

## 2019-09-12 DIAGNOSIS — M45.0 ANKYLOSING SPONDYLITIS OF MULTIPLE SITES IN SPINE: ICD-10-CM

## 2019-09-12 DIAGNOSIS — H20.021 IRITIS, RECURRENT, RIGHT: ICD-10-CM

## 2019-09-12 DIAGNOSIS — M47.899 HLA-B27 SPONDYLOARTHROPATHY: ICD-10-CM

## 2019-09-12 RX ORDER — ADALIMUMAB 40MG/0.8ML
40 KIT SUBCUTANEOUS
Qty: 12 PEN | Refills: 2 | Status: SHIPPED | OUTPATIENT
Start: 2019-09-12 | End: 2019-11-21 | Stop reason: SDUPTHER
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
## 2019-09-19 ENCOUNTER — OFFICE VISIT (OUTPATIENT)
Dept: RHEUMATOLOGY | Facility: CLINIC | Age: 49
End: 2019-09-19
Payer: MEDICARE

## 2019-09-19 ENCOUNTER — LAB VISIT (OUTPATIENT)
Dept: LAB | Facility: HOSPITAL | Age: 49
End: 2019-09-19
Attending: INTERNAL MEDICINE
Payer: MEDICARE

## 2019-09-19 VITALS
HEIGHT: 68 IN | HEART RATE: 69 BPM | SYSTOLIC BLOOD PRESSURE: 144 MMHG | BODY MASS INDEX: 27.6 KG/M2 | DIASTOLIC BLOOD PRESSURE: 85 MMHG | WEIGHT: 182.13 LBS

## 2019-09-19 DIAGNOSIS — M81.0 OSTEOPOROSIS WITHOUT CURRENT PATHOLOGICAL FRACTURE, UNSPECIFIED OSTEOPOROSIS TYPE: ICD-10-CM

## 2019-09-19 DIAGNOSIS — M47.899 HLA-B27 SPONDYLOARTHROPATHY: ICD-10-CM

## 2019-09-19 DIAGNOSIS — E55.9 VITAMIN D INSUFFICIENCY: ICD-10-CM

## 2019-09-19 DIAGNOSIS — M06.4 INFLAMMATORY POLYARTHRITIS: ICD-10-CM

## 2019-09-19 DIAGNOSIS — E55.9 MILD VITAMIN D DEFICIENCY: ICD-10-CM

## 2019-09-19 DIAGNOSIS — Z79.60 LONG-TERM USE OF IMMUNOSUPPRESSANT MEDICATION: ICD-10-CM

## 2019-09-19 DIAGNOSIS — M47.899 HLA-B27 SPONDYLOARTHROPATHY: Primary | ICD-10-CM

## 2019-09-19 LAB
25(OH)D3+25(OH)D2 SERPL-MCNC: 22 NG/ML (ref 30–96)
ALBUMIN SERPL BCP-MCNC: 3.8 G/DL (ref 3.5–5.2)
ALP SERPL-CCNC: 88 U/L (ref 55–135)
ALT SERPL W/O P-5'-P-CCNC: 15 U/L (ref 10–44)
ANION GAP SERPL CALC-SCNC: 8 MMOL/L (ref 8–16)
AST SERPL-CCNC: 23 U/L (ref 10–40)
BASOPHILS # BLD AUTO: 0.04 K/UL (ref 0–0.2)
BASOPHILS NFR BLD: 0.7 % (ref 0–1.9)
BILIRUB SERPL-MCNC: 0.4 MG/DL (ref 0.1–1)
BUN SERPL-MCNC: 15 MG/DL (ref 6–20)
CALCIUM SERPL-MCNC: 8.9 MG/DL (ref 8.7–10.5)
CHLORIDE SERPL-SCNC: 108 MMOL/L (ref 95–110)
CO2 SERPL-SCNC: 26 MMOL/L (ref 23–29)
CREAT SERPL-MCNC: 1.1 MG/DL (ref 0.5–1.4)
CRP SERPL-MCNC: 1.7 MG/L (ref 0–8.2)
DIFFERENTIAL METHOD: ABNORMAL
EOSINOPHIL # BLD AUTO: 0.1 K/UL (ref 0–0.5)
EOSINOPHIL NFR BLD: 2.5 % (ref 0–8)
ERYTHROCYTE [DISTWIDTH] IN BLOOD BY AUTOMATED COUNT: 15.6 % (ref 11.5–14.5)
ERYTHROCYTE [SEDIMENTATION RATE] IN BLOOD BY WESTERGREN METHOD: 31 MM/HR (ref 0–23)
EST. GFR  (AFRICAN AMERICAN): >60 ML/MIN/1.73 M^2
EST. GFR  (NON AFRICAN AMERICAN): >60 ML/MIN/1.73 M^2
GLUCOSE SERPL-MCNC: 79 MG/DL (ref 70–110)
HCT VFR BLD AUTO: 36.8 % (ref 40–54)
HGB BLD-MCNC: 11.2 G/DL (ref 14–18)
IMM GRANULOCYTES # BLD AUTO: 0.01 K/UL (ref 0–0.04)
IMM GRANULOCYTES NFR BLD AUTO: 0.2 % (ref 0–0.5)
LYMPHOCYTES # BLD AUTO: 3.5 K/UL (ref 1–4.8)
LYMPHOCYTES NFR BLD: 61.6 % (ref 18–48)
MCH RBC QN AUTO: 22.5 PG (ref 27–31)
MCHC RBC AUTO-ENTMCNC: 30.4 G/DL (ref 32–36)
MCV RBC AUTO: 74 FL (ref 82–98)
MONOCYTES # BLD AUTO: 0.5 K/UL (ref 0.3–1)
MONOCYTES NFR BLD: 8.9 % (ref 4–15)
NEUTROPHILS # BLD AUTO: 1.5 K/UL (ref 1.8–7.7)
NEUTROPHILS NFR BLD: 26.1 % (ref 38–73)
NRBC BLD-RTO: 0 /100 WBC
PLATELET # BLD AUTO: 202 K/UL (ref 150–350)
PMV BLD AUTO: 9.9 FL (ref 9.2–12.9)
POTASSIUM SERPL-SCNC: 3.8 MMOL/L (ref 3.5–5.1)
PROT SERPL-MCNC: 7.3 G/DL (ref 6–8.4)
RBC # BLD AUTO: 4.97 M/UL (ref 4.6–6.2)
SODIUM SERPL-SCNC: 142 MMOL/L (ref 136–145)
WBC # BLD AUTO: 5.7 K/UL (ref 3.9–12.7)

## 2019-09-19 PROCEDURE — 82306 VITAMIN D 25 HYDROXY: CPT

## 2019-09-19 PROCEDURE — 86140 C-REACTIVE PROTEIN: CPT

## 2019-09-19 PROCEDURE — 36415 COLL VENOUS BLD VENIPUNCTURE: CPT

## 2019-09-19 PROCEDURE — 85652 RBC SED RATE AUTOMATED: CPT

## 2019-09-19 PROCEDURE — 80053 COMPREHEN METABOLIC PANEL: CPT

## 2019-09-19 PROCEDURE — 99214 OFFICE O/P EST MOD 30 MIN: CPT | Mod: S$PBB,,, | Performed by: INTERNAL MEDICINE

## 2019-09-19 PROCEDURE — 99214 PR OFFICE/OUTPT VISIT, EST, LEVL IV, 30-39 MIN: ICD-10-PCS | Mod: S$PBB,,, | Performed by: INTERNAL MEDICINE

## 2019-09-19 PROCEDURE — 99999 PR PBB SHADOW E&M-EST. PATIENT-LVL IV: CPT | Mod: PBBFAC,,, | Performed by: INTERNAL MEDICINE

## 2019-09-19 PROCEDURE — 99999 PR PBB SHADOW E&M-EST. PATIENT-LVL IV: ICD-10-PCS | Mod: PBBFAC,,, | Performed by: INTERNAL MEDICINE

## 2019-09-19 PROCEDURE — 85025 COMPLETE CBC W/AUTO DIFF WBC: CPT

## 2019-09-19 PROCEDURE — 99214 OFFICE O/P EST MOD 30 MIN: CPT | Mod: PBBFAC | Performed by: INTERNAL MEDICINE

## 2019-09-19 RX ORDER — ASPIRIN 325 MG
50000 TABLET, DELAYED RELEASE (ENTERIC COATED) ORAL
Qty: 40 CAPSULE | Refills: 0 | Status: SHIPPED | OUTPATIENT
Start: 2019-09-19 | End: 2020-04-06 | Stop reason: SDUPTHER

## 2019-09-19 ASSESSMENT — ROUTINE ASSESSMENT OF PATIENT INDEX DATA (RAPID3)
PAIN SCORE: 10
MDHAQ FUNCTION SCORE: 2.1
AM STIFFNESS SCORE: 1, YES
PSYCHOLOGICAL DISTRESS SCORE: 3.3
TOTAL RAPID3 SCORE: 9
FATIGUE SCORE: 6
PATIENT GLOBAL ASSESSMENT SCORE: 10
WHEN YOU AWAKENED IN THE MORNING OVER THE LAST WEEK, PLEASE INDICATE THE AMOUNT OF TIME IT TAKES UNTIL YOU ARE AS LIMBER AS YOU WILL BE FOR THE DAY: 3.5HRS

## 2019-09-19 NOTE — Clinical Note
Please contact patient and tell him that I sent a rx to his pharmacy for vitamin D by prescription. If he cannot take it, he can take vitamin D3 5,000 IU capsules that are OTC; He can take 2/day x 3 months & then 2 once a week thereafter.

## 2019-11-19 RX ORDER — FAMOTIDINE 40 MG/1
40 TABLET, FILM COATED ORAL EVERY 12 HOURS
Qty: 30 TABLET | Refills: 5 | Status: SHIPPED | OUTPATIENT
Start: 2019-11-19 | End: 2020-03-05 | Stop reason: SDUPTHER

## 2019-11-21 DIAGNOSIS — M47.899 HLA-B27 SPONDYLOARTHROPATHY: ICD-10-CM

## 2019-11-21 DIAGNOSIS — M45.0 ANKYLOSING SPONDYLITIS OF MULTIPLE SITES IN SPINE: ICD-10-CM

## 2019-11-21 DIAGNOSIS — M06.4 INFLAMMATORY POLYARTHRITIS: ICD-10-CM

## 2019-11-21 DIAGNOSIS — H20.021 IRITIS, RECURRENT, RIGHT: ICD-10-CM

## 2019-11-21 RX ORDER — ADALIMUMAB 40MG/0.8ML
40 KIT SUBCUTANEOUS
Qty: 12 PEN | Refills: 2 | Status: SHIPPED | OUTPATIENT
Start: 2019-11-21 | End: 2020-03-09 | Stop reason: SDUPTHER

## 2019-11-29 ENCOUNTER — OFFICE VISIT (OUTPATIENT)
Dept: FAMILY MEDICINE | Facility: CLINIC | Age: 49
End: 2019-11-29
Payer: MEDICARE

## 2019-11-29 VITALS
BODY MASS INDEX: 29.79 KG/M2 | HEIGHT: 65 IN | HEART RATE: 83 BPM | OXYGEN SATURATION: 97 % | DIASTOLIC BLOOD PRESSURE: 96 MMHG | SYSTOLIC BLOOD PRESSURE: 124 MMHG | TEMPERATURE: 99 F | RESPIRATION RATE: 16 BRPM | WEIGHT: 178.81 LBS

## 2019-11-29 DIAGNOSIS — M48.02 SPINAL STENOSIS OF CERVICAL REGION: ICD-10-CM

## 2019-11-29 DIAGNOSIS — M06.4 INFLAMMATORY POLYARTHRITIS: ICD-10-CM

## 2019-11-29 DIAGNOSIS — I10 ESSENTIAL HYPERTENSION: ICD-10-CM

## 2019-11-29 DIAGNOSIS — M45.0 ANKYLOSING SPONDYLITIS OF MULTIPLE SITES IN SPINE: Primary | ICD-10-CM

## 2019-11-29 DIAGNOSIS — L02.32 BOIL OF BUTTOCK: ICD-10-CM

## 2019-11-29 DIAGNOSIS — M51.37 DEGENERATION OF LUMBAR OR LUMBOSACRAL INTERVERTEBRAL DISC: ICD-10-CM

## 2019-11-29 PROCEDURE — 99215 OFFICE O/P EST HI 40 MIN: CPT | Mod: S$PBB,,, | Performed by: FAMILY MEDICINE

## 2019-11-29 PROCEDURE — 99999 PR PBB SHADOW E&M-EST. PATIENT-LVL III: CPT | Mod: PBBFAC,,, | Performed by: FAMILY MEDICINE

## 2019-11-29 PROCEDURE — 99999 PR PBB SHADOW E&M-EST. PATIENT-LVL III: ICD-10-PCS | Mod: PBBFAC,,, | Performed by: FAMILY MEDICINE

## 2019-11-29 PROCEDURE — 99215 PR OFFICE/OUTPT VISIT, EST, LEVL V, 40-54 MIN: ICD-10-PCS | Mod: S$PBB,,, | Performed by: FAMILY MEDICINE

## 2019-11-29 PROCEDURE — 99213 OFFICE O/P EST LOW 20 MIN: CPT | Mod: PBBFAC,PO | Performed by: FAMILY MEDICINE

## 2019-11-29 RX ORDER — OXYCODONE AND ACETAMINOPHEN 10; 325 MG/1; MG/1
1 TABLET ORAL 3 TIMES DAILY PRN
Qty: 90 TABLET | Refills: 0 | Status: SHIPPED | OUTPATIENT
Start: 2019-12-27 | End: 2020-01-26

## 2019-11-29 RX ORDER — OXYCODONE AND ACETAMINOPHEN 10; 325 MG/1; MG/1
1 TABLET ORAL 3 TIMES DAILY PRN
Qty: 90 TABLET | Refills: 0 | Status: SHIPPED | OUTPATIENT
Start: 2019-11-29 | End: 2019-12-29

## 2019-11-29 RX ORDER — SULFAMETHOXAZOLE AND TRIMETHOPRIM 800; 160 MG/1; MG/1
TABLET ORAL
Refills: 0 | COMMUNITY
Start: 2019-11-19 | End: 2020-07-27

## 2019-11-29 RX ORDER — OXYCODONE AND ACETAMINOPHEN 10; 325 MG/1; MG/1
1 TABLET ORAL 3 TIMES DAILY PRN
Qty: 90 TABLET | Refills: 0 | Status: SHIPPED | OUTPATIENT
Start: 2020-01-24 | End: 2020-02-21 | Stop reason: SDUPTHER

## 2019-11-29 NOTE — PROGRESS NOTES
Chief Complaint   Patient presents with    Medication Refill    Mass     on buttocks       Beau Ferraro is a 49 y.o. male who presents per the Chief Complaint.  Pt is known to me and was last seen by me on 9/3/2019.  All known chronic medical issues have been documented.       Mass   This is a new problem. The current episode started 1 to 4 weeks ago. The problem occurs constantly. The problem has been gradually improving. Associated symptoms include arthralgias (right hand; most joints), myalgias and a rash (on face and behind ears). Pertinent negatives include no abdominal pain, anorexia, change in bowel habit, chest pain, chills, congestion, coughing, diaphoresis, fatigue, fever, headaches, joint swelling, nausea, neck pain, numbness, sore throat, swollen glands, urinary symptoms, vertigo, visual change, vomiting or weakness. Nothing aggravates the symptoms. He has tried nothing for the symptoms.   Hypertension   This is a new problem. The current episode started more than 1 month ago. The problem is unchanged. The problem is uncontrolled. Pertinent negatives include no anxiety, blurred vision, chest pain, headaches, malaise/fatigue, neck pain, orthopnea, palpitations, peripheral edema, PND, shortness of breath or sweats. There are no associated agents to hypertension. Risk factors for coronary artery disease include male gender and sedentary lifestyle. Past treatments include nothing. Compliance problems include exercise and psychosocial issues.  There is no history of angina, kidney disease, CAD/MI, CVA, heart failure, left ventricular hypertrophy, PVD or retinopathy. There is no history of chronic renal disease, coarctation of the aorta, hyperaldosteronism, hypercortisolism, hyperparathyroidism, a hypertension causing med, pheochromocytoma, renovascular disease, sleep apnea or a thyroid problem.   Back Pain   This is a chronic problem. The current episode started more than 1 year ago. The problem occurs  daily. The problem is unchanged. The pain is present in the lumbar spine. The quality of the pain is described as aching. The pain does not radiate. The pain is at a severity of 7/10. The pain is moderate. The pain is the same all the time. The symptoms are aggravated by bending, position, standing and lying down. Stiffness is present all day. Associated symptoms include leg pain, paresis and tingling. Pertinent negatives include no abdominal pain, bladder incontinence, bowel incontinence, chest pain, dysuria, fever, headaches, numbness, paresthesias, pelvic pain, perianal numbness, weakness or weight loss. Risk factors include lack of exercise and poor posture. He has tried analgesics and muscle relaxant for the symptoms. The treatment provided moderate relief.   Arthritis   Presents for follow-up visit. He complains of pain and stiffness. He reports no joint swelling or joint warmth. The symptoms have been stable. Affected locations include the neck, right hip, left hip, right knee, left knee, right ankle, left ankle, right foot, left foot, right wrist, left wrist, right elbow, left elbow, right shoulder and left shoulder. His pain is at a severity of 9/10. Associated symptoms include dry eyes, pain at night, pain while resting, rash (on face and behind ears) and uveitis. Pertinent negatives include no diarrhea, dry mouth, dysuria, fatigue, fever, Raynaud's syndrome or weight loss. Side effects of treatment include joint pain and limb pain.   Chronic Pain  Past Medical History includes: rheumatoid arthritis and chronic pain syndrome. Patient states that the pain is chronic. Beau describes pain involving the cervical spine, lumbar spine, thoracic spine, knee and hip. The onset of his pain began more than 1 year ago. The frequency of pain is described as constantly. Progression of pain since onset is waxing and waning. Patient describes pain as a 8/10. Beau is currently treating his symptoms with  oxycodone/acetaminophen (Percocet) and tramadol. Patient has shown moderate improvement with current treatment.  Goals of therapy include: Improve ADL's. Beau has previously tried hydrocodone/acetaminophen (Hycet, Lorcet, Lortab, Norco,Vicodin), Nerve Block, NSAIDs, Gabapentin (Neurontin) and oxycodone/acetaminophen (Percocet) to treat his pain. Associated symptoms include: leg pain, paresis, tingling and ADLs performed poorly. Previous Imaging studies include: MRI Scan and X-Ray.  Patient has not had a drug screen. Patient does have a pain contract. Prescription Monitoring Program has been reviewed.. Patient's history of substance abuse includes: none. Patient's psychiatric disorders include: none.       ROS  Review of Systems   Constitutional: Negative.  Negative for activity change, appetite change, chills, diaphoresis, fatigue, fever, malaise/fatigue, unexpected weight change and weight loss.   HENT: Negative.  Negative for congestion, ear pain, hearing loss, nosebleeds, postnasal drip, rhinorrhea, sinus pressure, sneezing, sore throat and trouble swallowing.    Eyes: Negative for blurred vision, pain and visual disturbance.   Respiratory: Negative for apnea, cough, choking, chest tightness and shortness of breath.    Cardiovascular: Negative for chest pain, palpitations, orthopnea, leg swelling and PND.   Gastrointestinal: Negative for abdominal distention, abdominal pain, anal bleeding, anorexia, blood in stool, bowel incontinence, change in bowel habit, constipation, diarrhea, nausea, rectal pain and vomiting.   Genitourinary: Negative for bladder incontinence, difficulty urinating, dysuria, flank pain, frequency, pelvic pain, penile pain, penile swelling, scrotal swelling, testicular pain and urgency.   Musculoskeletal: Positive for arthralgias (right hand; most joints), arthritis, back pain, gait problem, myalgias, neck stiffness and stiffness. Negative for joint swelling and neck pain.   Skin: Positive  "for rash (on face and behind ears) and wound (under right arm pit). Negative for color change and pallor.   Allergic/Immunologic: Negative for environmental allergies, food allergies and immunocompromised state.   Neurological: Positive for tingling. Negative for dizziness, vertigo, seizures, syncope, weakness, light-headedness, numbness, headaches and paresthesias.   Psychiatric/Behavioral: Positive for dysphoric mood. Negative for confusion, decreased concentration and sleep disturbance. The patient is nervous/anxious.        Physical Exam  Vitals:    11/29/19 1028   BP: (!) 124/96   Pulse: 83   Resp: 16   Temp: 98.6 °F (37 °C)    Body mass index is 29.75 kg/m².  Weight: 81.1 kg (178 lb 12.7 oz)   Height: 5' 5" (165.1 cm)     Physical Exam   Constitutional: He is oriented to person, place, and time. He appears well-developed and well-nourished. He is active and cooperative.  Non-toxic appearance. He does not have a sickly appearance. He does not appear ill. No distress.   HENT:   Head: Normocephalic and atraumatic.   Right Ear: Hearing, tympanic membrane, external ear and ear canal normal. No tenderness. No foreign bodies. No mastoid tenderness. Tympanic membrane is not injected, not scarred, not perforated, not erythematous, not retracted and not bulging. No hemotympanum. No decreased hearing is noted.   Left Ear: Hearing, tympanic membrane, external ear and ear canal normal. No tenderness. No foreign bodies. No mastoid tenderness. Tympanic membrane is not injected, not scarred, not perforated, not erythematous, not retracted and not bulging. No hemotympanum. No decreased hearing is noted.   Nose: Nose normal. No rhinorrhea, sinus tenderness, nasal deformity or septal deviation. No epistaxis.  No foreign bodies.   Mouth/Throat: Uvula is midline, oropharynx is clear and moist and mucous membranes are normal. He does not have dentures. Normal dentition. No uvula swelling or dental caries. No oropharyngeal exudate, " posterior oropharyngeal edema, posterior oropharyngeal erythema or tonsillar abscesses.   Eyes: Pupils are equal, round, and reactive to light. Conjunctivae, EOM and lids are normal. Right eye exhibits no chemosis, no discharge, no exudate and no hordeolum. No foreign body present in the right eye. Left eye exhibits no chemosis, no discharge, no exudate and no hordeolum. No foreign body present in the left eye. No scleral icterus.   Neck: Normal range of motion and full passive range of motion without pain. Neck supple. No thyroid mass and no thyromegaly present.   Cardiovascular: Normal rate, regular rhythm, S1 normal, S2 normal and normal heart sounds. Exam reveals no gallop and no friction rub.   No murmur heard.  Pulmonary/Chest: Effort normal and breath sounds normal. No accessory muscle usage. No respiratory distress. He has no decreased breath sounds. He has no wheezes. He has no rhonchi. He has no rales.   Abdominal: Soft. Normal appearance and bowel sounds are normal. He exhibits no distension, no ascites and no mass. There is no hepatosplenomegaly. There is no tenderness. There is no rigidity, no rebound and no guarding. No hernia.   Musculoskeletal:        Right hip: He exhibits tenderness and bony tenderness. He exhibits normal range of motion, normal strength, no swelling, no crepitus, no deformity and no laceration.        Left hip: He exhibits tenderness and bony tenderness. He exhibits normal range of motion, normal strength, no swelling, no crepitus, no deformity and no laceration.        Cervical back: He exhibits decreased range of motion and pain. He exhibits no tenderness, no bony tenderness, no swelling, no edema, no deformity, no laceration, no spasm and normal pulse.        Lumbar back: He exhibits decreased range of motion, bony tenderness and pain. He exhibits no tenderness, no swelling, no edema, no deformity, no laceration, no spasm and normal pulse.        Right hand: He exhibits  decreased range of motion, tenderness, bony tenderness, deformity and swelling. He exhibits normal two-point discrimination, normal capillary refill and no laceration. Normal sensation noted. Decreased sensation is not present in the ulnar distribution and is not present in the medial distribution. Decreased strength noted. He exhibits finger abduction and thumb/finger opposition. He exhibits no wrist extension trouble.        Right foot: There is decreased range of motion, tenderness and swelling. There is no bony tenderness, normal capillary refill, no crepitus, no deformity and no laceration.        Left foot: There is decreased range of motion, tenderness and swelling. There is no bony tenderness, normal capillary refill, no crepitus, no deformity and no laceration.   Lymphadenopathy:        Head (right side): No submental, no submandibular, no preauricular and no posterior auricular adenopathy present.        Head (left side): No submental, no submandibular, no preauricular and no posterior auricular adenopathy present.     He has no cervical adenopathy.   Neurological: He is alert and oriented to person, place, and time. He has normal strength. He displays no atrophy and no tremor. No cranial nerve deficit or sensory deficit. He exhibits normal muscle tone. He displays no seizure activity. Coordination and gait normal.   Skin: Skin is warm, dry and intact. No rash noted. He is not diaphoretic. No erythema. No pallor.   Covered in multiple tattoos over face, torso, and arms.   Psychiatric: He has a normal mood and affect. His speech is normal and behavior is normal. Judgment and thought content normal. His mood appears not anxious. His affect is not angry, not blunt, not labile and not inappropriate. Cognition and memory are normal. He does not exhibit a depressed mood. He is attentive.   Vitals reviewed.      Assessment & Plan    Discussion of plan of care including treatment options regarding health and  wellness were reviewed and discussed with patient.  Any changes to medication or treatment plan, as well as any screening blood test, imaging, or referrals to specialist, are documented.  Follow up as indicated.     1. Ankylosing spondylitis of multiple sites in spine  Managed by Rheumatology.  Stable; no therapeutic changes at this time.  Discussed rules regarding chronic pain management with opiate/opioid therapy.  Discussed use of alternative medications to manage pain with goal of reducing and possibly discontinue opioid therapy, and advised that in order to continue current therapy they would need to schedule appointment at least once every 90 days, as well as consent to random urine drug screening.  The legitimate medical purpose of using a controlled substance for pain management is AS, DDD, and cervical stenosis.  Patient has been screened through the [facility] and is not receiving controlled substances from any other provider.  At this time, I have no suspicion of illegal activity and feel that with proper usage, there is low risk for overdose.  Will reorder PT/OT to improve symptoms.  - oxyCODONE-acetaminophen (PERCOCET)  mg per tablet; Take 1 tablet by mouth 3 (three) times daily as needed.  Dispense: 90 tablet; Refill: 0  - oxyCODONE-acetaminophen (PERCOCET)  mg per tablet; Take 1 tablet by mouth 3 (three) times daily as needed.  Dispense: 90 tablet; Refill: 0  - oxyCODONE-acetaminophen (PERCOCET)  mg per tablet; Take 1 tablet by mouth 3 (three) times daily as needed.  Dispense: 90 tablet; Refill: 0  - Ambulatory Referral to Physical/Occupational Therapy    2. Degeneration of lumbar or lumbosacral intervertebral disc  The current medical regimen will be continued at this time as discussed.       - oxyCODONE-acetaminophen (PERCOCET)  mg per tablet; Take 1 tablet by mouth 3 (three) times daily as needed.  Dispense: 90 tablet; Refill: 0  - oxyCODONE-acetaminophen (PERCOCET)   mg per tablet; Take 1 tablet by mouth 3 (three) times daily as needed.  Dispense: 90 tablet; Refill: 0  - oxyCODONE-acetaminophen (PERCOCET)  mg per tablet; Take 1 tablet by mouth 3 (three) times daily as needed.  Dispense: 90 tablet; Refill: 0  - Ambulatory Referral to Physical/Occupational Therapy    3. Essential hypertension  Patient was counseled and encouraged to maintain a low sodium diet, as well as increasing physical activity.  Recommend random BP checks at home on a regular basis.  Repeat BP at end of visit was not necessary. Will continue medication at this time, and follow up in 3-6 months, or sooner if blood pressure begins to increase.       4. Inflammatory polyarthritis  Stable; no therapeutic changes at this time.   - Ambulatory Referral to Physical/Occupational Therapy    5. Spinal stenosis of cervical region  The current medical regimen will be continued at this time as discussed.       - Ambulatory Referral to Physical/Occupational Therapy    6. Boil of buttock  Managed with antibiotics; encouraged to complete antibiotic therapy.  Advised not to squeeze in order to force discharge, but rather apply warm to hot compresses and/or a baking soda based paste or OTC therapy to area to assist in drainage.         Follow up in about 3 months (around 2/29/2020), or if symptoms worsen or fail to improve.        ACTIVE MEDICAL ISSUES:  Documented in Problem List    PAST MEDICAL HISTORY  Documented    PAST SURGICAL HISTORY:  Documented    SOCIAL HISTORY:  Documented    FAMILY HISTORY:  Documented    ALLERGIES AND MEDICATIONS: updated and reviewed.  Documented    Health Maintenance       Date Due Completion Date    TETANUS VACCINE 06/23/1988 ---    Sign Pain Contract 06/23/1988 ---    Lipid Panel 06/20/2019 6/20/2018    Influenza Vaccine (1) 09/01/2019 9/21/2018

## 2019-12-03 DIAGNOSIS — I10 ESSENTIAL HYPERTENSION: ICD-10-CM

## 2019-12-04 RX ORDER — AMLODIPINE BESYLATE 5 MG/1
5 TABLET ORAL DAILY
Qty: 30 TABLET | Refills: 5 | Status: SHIPPED | OUTPATIENT
Start: 2019-12-04 | End: 2020-05-05 | Stop reason: SDUPTHER

## 2020-01-23 DIAGNOSIS — L30.9 CHRONIC ECZEMA: ICD-10-CM

## 2020-01-23 NOTE — TELEPHONE ENCOUNTER
Last Office Visit Info:   The patient's last visit with Azikiwe K Lombard, MD was on 11/29/2019.    The patient's last visit in current department was on 11/29/2019.        Last CBC Results:   Lab Results   Component Value Date    WBC 5.70 09/19/2019    HGB 11.2 (L) 09/19/2019    HCT 36.8 (L) 09/19/2019     09/19/2019       Last CMP Results  Lab Results   Component Value Date     09/19/2019    K 3.8 09/19/2019     09/19/2019    CO2 26 09/19/2019    BUN 15 09/19/2019    CREATININE 1.1 09/19/2019    CALCIUM 8.9 09/19/2019    ALBUMIN 3.8 09/19/2019    AST 23 09/19/2019    ALT 15 09/19/2019       Last Lipids  Lab Results   Component Value Date    CHOL 212 (H) 06/20/2018    TRIG 72 06/20/2018    HDL 51 06/20/2018    LDLCALC 146.6 06/20/2018       Last A1C  No results found for: HGBA1C    Last TSH  Lab Results   Component Value Date    TSH 1.453 12/16/2014         Current Med Refills  Medication List with Changes/Refills   Current Medications    ADALIMUMAB (HUMIRA PEN) 40 MG/0.8 ML PNKT    Inject 1 pen (40 mg total) into the skin every 7 days.       Start Date: 11/21/2019End Date: 2/13/2020    AMLODIPINE (NORVASC) 5 MG TABLET    Take 1 tablet (5 mg total) by mouth once daily.       Start Date: 12/4/2019 End Date: --    BACK BRACE MISC    1 each by Misc.(Non-Drug; Combo Route) route Daily.       Start Date: 5/12/2016 End Date: --    CHOLECALCIFEROL, VITAMIN D3, (DECARA) 50,000 UNIT CAPSULE    Take 1 capsule (50,000 Units total) by mouth twice a week. X 20 weeks followed by OTC vitamin D3 1000 IU daily.       Start Date: 9/19/2019 End Date: --    CLONAZEPAM (KLONOPIN) 1 MG TABLET    TAKE 1/2 TABLET BY MOUTH TWICE DAILY AND 1 TABLET AT BEDTIME       Start Date: 6/19/2018 End Date: --    CLOTRIMAZOLE (LOTRIMIN) 1 % SOLN    APPLY A DROP BETWEEN THE TOES TWICE DAILY       Start Date: 6/1/2019  End Date: --    FAMOTIDINE (PEPCID) 40 MG TABLET    Take 1 tablet (40 mg total) by mouth every 12 (twelve) hours.        Start Date: 11/19/2019End Date: --    OXYCODONE-ACETAMINOPHEN (PERCOCET)  MG PER TABLET    Take 1 tablet by mouth 3 (three) times daily as needed.       Start Date: 12/27/2019End Date: 1/26/2020    OXYCODONE-ACETAMINOPHEN (PERCOCET)  MG PER TABLET    Take 1 tablet by mouth 3 (three) times daily as needed.       Start Date: 1/24/2020 End Date: 2/23/2020    PRAVASTATIN (PRAVACHOL) 20 MG TABLET    Take 20 mg by mouth nightly.       Start Date: 10/10/2015End Date: --    SULFAMETHOXAZOLE-TRIMETHOPRIM 800-160MG (BACTRIM DS) 800-160 MG TAB    TAKE ONE TABLET BY MOUTH EVERY TWELVE HOURS FOR 10 DAYS       Start Date: 11/19/2019End Date: --    TERBINAFINE HCL (LAMISIL AT) 1 % CREAM    Apply topically 2 (two) times daily.       Start Date: 6/11/2019 End Date: --    TRIAMCINOLONE (KENALOG) 0.5 % OINTMENT    Apply topically 2 (two) times daily.       Start Date: 9/3/2019  End Date: --       Order(s) placed per written order guidelines:     Please advise.

## 2020-01-24 RX ORDER — TRIAMCINOLONE ACETONIDE 5 MG/G
OINTMENT TOPICAL 2 TIMES DAILY
Qty: 30 G | Refills: 5 | Status: SHIPPED | OUTPATIENT
Start: 2020-01-24 | End: 2020-06-23 | Stop reason: SDUPTHER

## 2020-02-05 ENCOUNTER — TELEPHONE (OUTPATIENT)
Dept: FAMILY MEDICINE | Facility: CLINIC | Age: 50
End: 2020-02-05

## 2020-02-05 NOTE — TELEPHONE ENCOUNTER
----- Message from Cynthia Torrez sent at 2/5/2020 12:47 PM CST -----  Contact: Lisa Villela   Type:  Pharmacy Calling to Clarify an RX    Name of Caller: Lisa Villela     Pharmacy Name: Pharmacy    Prescription Name: famotidine (PEPCID) 40 MG tablet    What do they need to clarify? Pharmacy[facility]é [facility] is requesting a 20 MG 2x a day     Can you be contacted via [facility]?    Best Call Back Number: [phone]      [facility]- [place], [place] - [address]  [address]  [place] [address]  Phone: [phone] Fax: [phone]

## 2020-02-05 NOTE — TELEPHONE ENCOUNTER
Spoke with pharmacy, states that pepcid 40mg is on back order, needs to give two 20 mg tabs. Gave ok, per PCP.

## 2020-02-19 ENCOUNTER — TELEPHONE (OUTPATIENT)
Dept: FAMILY MEDICINE | Facility: CLINIC | Age: 50
End: 2020-02-19

## 2020-02-19 NOTE — TELEPHONE ENCOUNTER
Patient scheduled ok per Dr.Lombard, patient notified of appt date and time, patient verbalized understanding

## 2020-02-19 NOTE — TELEPHONE ENCOUNTER
----- Message from Lula Ratliff sent at 2/19/2020  1:31 PM CST -----  Contact: Self   Type: Patient Call Back    Who called: Self     What is the request in detail:patient would like to know if he can come in before Teddy Moreno to  See the doctor and get a refill on his BP medication. Please call     Can the clinic reply by [facility]? No   Would the patient rather a call back or a response via [facility]?  Call     Best call back number:[phone]

## 2020-02-21 ENCOUNTER — OFFICE VISIT (OUTPATIENT)
Dept: FAMILY MEDICINE | Facility: CLINIC | Age: 50
End: 2020-02-21
Payer: MEDICARE

## 2020-02-21 VITALS
SYSTOLIC BLOOD PRESSURE: 132 MMHG | TEMPERATURE: 98 F | OXYGEN SATURATION: 97 % | WEIGHT: 183 LBS | BODY MASS INDEX: 30.49 KG/M2 | HEIGHT: 65 IN | RESPIRATION RATE: 20 BRPM | DIASTOLIC BLOOD PRESSURE: 86 MMHG | HEART RATE: 77 BPM

## 2020-02-21 DIAGNOSIS — M45.0 ANKYLOSING SPONDYLITIS OF MULTIPLE SITES IN SPINE: Primary | ICD-10-CM

## 2020-02-21 DIAGNOSIS — K50.10 CROHN'S DISEASE OF LARGE INTESTINE WITHOUT COMPLICATION: ICD-10-CM

## 2020-02-21 DIAGNOSIS — M06.4 INFLAMMATORY POLYARTHRITIS: ICD-10-CM

## 2020-02-21 DIAGNOSIS — F11.20 OPIOID DEPENDENCE, CONTINUOUS: ICD-10-CM

## 2020-02-21 DIAGNOSIS — M51.37 DEGENERATION OF LUMBAR OR LUMBOSACRAL INTERVERTEBRAL DISC: ICD-10-CM

## 2020-02-21 PROCEDURE — 99215 PR OFFICE/OUTPT VISIT, EST, LEVL V, 40-54 MIN: ICD-10-PCS | Mod: S$PBB,,, | Performed by: FAMILY MEDICINE

## 2020-02-21 PROCEDURE — 99999 PR PBB SHADOW E&M-EST. PATIENT-LVL III: CPT | Mod: PBBFAC,,, | Performed by: FAMILY MEDICINE

## 2020-02-21 PROCEDURE — 99215 OFFICE O/P EST HI 40 MIN: CPT | Mod: S$PBB,,, | Performed by: FAMILY MEDICINE

## 2020-02-21 PROCEDURE — 99213 OFFICE O/P EST LOW 20 MIN: CPT | Mod: PBBFAC,PO | Performed by: FAMILY MEDICINE

## 2020-02-21 PROCEDURE — 99999 PR PBB SHADOW E&M-EST. PATIENT-LVL III: ICD-10-PCS | Mod: PBBFAC,,, | Performed by: FAMILY MEDICINE

## 2020-02-21 RX ORDER — OXYCODONE AND ACETAMINOPHEN 10; 325 MG/1; MG/1
1 TABLET ORAL 3 TIMES DAILY PRN
Qty: 90 TABLET | Refills: 0 | Status: SHIPPED | OUTPATIENT
Start: 2020-04-27 | End: 2020-05-21 | Stop reason: SDUPTHER

## 2020-02-21 RX ORDER — OXYCODONE AND ACETAMINOPHEN 10; 325 MG/1; MG/1
1 TABLET ORAL 3 TIMES DAILY PRN
Qty: 90 TABLET | Refills: 0 | Status: SHIPPED | OUTPATIENT
Start: 2020-03-27 | End: 2020-04-26

## 2020-02-21 RX ORDER — FAMOTIDINE 20 MG/1
TABLET, FILM COATED ORAL
COMMUNITY
Start: 2020-02-17 | End: 2020-03-05

## 2020-02-21 RX ORDER — OXYCODONE AND ACETAMINOPHEN 10; 325 MG/1; MG/1
1 TABLET ORAL 3 TIMES DAILY PRN
Qty: 90 TABLET | Refills: 0 | Status: SHIPPED | OUTPATIENT
Start: 2020-02-27 | End: 2020-03-28

## 2020-02-21 NOTE — PROGRESS NOTES
No chief complaint on file.      Beau Ferraro is a 49 y.o. male who presents per the Chief Complaint.  Pt is known to me and was last seen by me on 11/29/2019.  All known chronic medical issues have been documented.       Hypertension   This is a new problem. The current episode started more than 1 month ago. The problem is unchanged. The problem is uncontrolled. Pertinent negatives include no anxiety, blurred vision, chest pain, headaches, malaise/fatigue, neck pain, orthopnea, palpitations, peripheral edema, PND, shortness of breath or sweats. There are no associated agents to hypertension. Risk factors for coronary artery disease include male gender and sedentary lifestyle. Past treatments include nothing. Compliance problems include exercise and psychosocial issues.  There is no history of angina, kidney disease, CAD/MI, CVA, heart failure, left ventricular hypertrophy, PVD or retinopathy. There is no history of chronic renal disease, coarctation of the aorta, hyperaldosteronism, hypercortisolism, hyperparathyroidism, a hypertension causing med, pheochromocytoma, renovascular disease, sleep apnea or a thyroid problem.   Back Pain   This is a chronic problem. The current episode started more than 1 year ago. The problem occurs daily. The problem is unchanged. The pain is present in the lumbar spine. The quality of the pain is described as aching. The pain does not radiate. The pain is at a severity of 7/10. The pain is moderate. The pain is the same all the time. The symptoms are aggravated by bending, position, standing and lying down. Stiffness is present all day. Associated symptoms include leg pain, paresis and tingling. Pertinent negatives include no abdominal pain, bladder incontinence, bowel incontinence, chest pain, dysuria, fever, headaches, numbness, paresthesias, pelvic pain, perianal numbness, weakness or weight loss. Risk factors include lack of exercise and poor posture. He has tried analgesics  and muscle relaxant for the symptoms. The treatment provided moderate relief.   Arthritis   Presents for follow-up visit. He complains of pain and stiffness. He reports no joint swelling or joint warmth. The symptoms have been stable. Affected locations include the neck, right hip, left hip, right knee, left knee, right ankle, left ankle, right foot, left foot, right wrist, left wrist, right elbow, left elbow, right shoulder and left shoulder. His pain is at a severity of 9/10. Associated symptoms include dry eyes, pain at night, pain while resting, rash (on face and behind ears) and uveitis. Pertinent negatives include no diarrhea, dry mouth, dysuria, fatigue, fever, Raynaud's syndrome or weight loss. Side effects of treatment include joint pain and limb pain.   Chronic Pain  Past Medical History includes: rheumatoid arthritis and chronic pain syndrome. Patient states that the pain is chronic. Beau describes pain involving the cervical spine, lumbar spine, thoracic spine, knee and hip. The onset of his pain began more than 1 year ago. The frequency of pain is described as constantly. Progression of pain since onset is waxing and waning. Patient describes pain as a 8/10. Beau is currently treating his symptoms with oxycodone/acetaminophen (Percocet) and tramadol. Patient has shown moderate improvement with current treatment.  Goals of therapy include: Improve ADL's. Beau has previously tried hydrocodone/acetaminophen (Hycet, Lorcet, Lortab, Norco,Vicodin), Nerve Block, NSAIDs, Gabapentin (Neurontin) and oxycodone/acetaminophen (Percocet) to treat his pain. Associated symptoms include: leg pain, paresis, tingling and ADLs performed poorly. Previous Imaging studies include: MRI Scan and X-Ray.  Patient has not had a drug screen. Patient does have a pain contract. Prescription Monitoring Program has been reviewed.. Patient's history of substance abuse includes: none. Patient's psychiatric disorders include:  none.       ROS  Review of Systems   Constitutional: Negative.  Negative for activity change, appetite change, chills, diaphoresis, fatigue, fever, malaise/fatigue, unexpected weight change and weight loss.   HENT: Negative.  Negative for congestion, ear pain, hearing loss, nosebleeds, postnasal drip, rhinorrhea, sinus pressure, sneezing, sore throat and trouble swallowing.    Eyes: Negative for blurred vision, pain and visual disturbance.   Respiratory: Negative for apnea, cough, choking, chest tightness and shortness of breath.    Cardiovascular: Negative for chest pain, palpitations, orthopnea, leg swelling and PND.   Gastrointestinal: Negative for abdominal distention, abdominal pain, anal bleeding, anorexia, blood in stool, bowel incontinence, change in bowel habit, constipation, diarrhea, nausea, rectal pain and vomiting.   Genitourinary: Negative for bladder incontinence, difficulty urinating, dysuria, flank pain, frequency, pelvic pain, penile pain, penile swelling, scrotal swelling, testicular pain and urgency.   Musculoskeletal: Positive for arthralgias (right hand; most joints), arthritis, back pain, gait problem, myalgias, neck stiffness and stiffness. Negative for joint swelling and neck pain.   Skin: Positive for rash (on face and behind ears) and wound (under right arm pit). Negative for color change and pallor.   Allergic/Immunologic: Negative for environmental allergies, food allergies and immunocompromised state.   Neurological: Positive for tingling. Negative for dizziness, vertigo, seizures, syncope, weakness, light-headedness, numbness, headaches and paresthesias.   Psychiatric/Behavioral: Positive for dysphoric mood. Negative for confusion, decreased concentration and sleep disturbance. The patient is nervous/anxious.        Physical Exam  Vitals:    02/21/20 1150   BP: 132/86   Pulse: 77   Resp: 20   Temp: 98 °F (36.7 °C)    Body mass index is 30.45 kg/m².  Weight: 83 kg (182 lb 15.7 oz)  "  Height: 5' 5" (165.1 cm)     Physical Exam   Constitutional: He is oriented to person, place, and time. He appears well-developed and well-nourished. He is active and cooperative.  Non-toxic appearance. He does not have a sickly appearance. He does not appear ill. No distress.   HENT:   Head: Normocephalic and atraumatic.   Right Ear: Hearing, tympanic membrane, external ear and ear canal normal. No tenderness. No foreign bodies. No mastoid tenderness. Tympanic membrane is not injected, not scarred, not perforated, not erythematous, not retracted and not bulging. No hemotympanum. No decreased hearing is noted.   Left Ear: Hearing, tympanic membrane, external ear and ear canal normal. No tenderness. No foreign bodies. No mastoid tenderness. Tympanic membrane is not injected, not scarred, not perforated, not erythematous, not retracted and not bulging. No hemotympanum. No decreased hearing is noted.   Nose: Nose normal. No rhinorrhea, sinus tenderness, nasal deformity or septal deviation. No epistaxis.  No foreign bodies.   Mouth/Throat: Uvula is midline, oropharynx is clear and moist and mucous membranes are normal. He does not have dentures. Normal dentition. No uvula swelling or dental caries. No oropharyngeal exudate, posterior oropharyngeal edema, posterior oropharyngeal erythema or tonsillar abscesses.   Eyes: Pupils are equal, round, and reactive to light. Conjunctivae, EOM and lids are normal. Right eye exhibits no chemosis, no discharge, no exudate and no hordeolum. No foreign body present in the right eye. Left eye exhibits no chemosis, no discharge, no exudate and no hordeolum. No foreign body present in the left eye. No scleral icterus.   Neck: Normal range of motion and full passive range of motion without pain. Neck supple. No thyroid mass and no thyromegaly present.   Cardiovascular: Normal rate, regular rhythm, S1 normal, S2 normal and normal heart sounds. Exam reveals no gallop and no friction rub. "   No murmur heard.  Pulmonary/Chest: Effort normal and breath sounds normal. No accessory muscle usage. No respiratory distress. He has no decreased breath sounds. He has no wheezes. He has no rhonchi. He has no rales.   Abdominal: Soft. Normal appearance and bowel sounds are normal. He exhibits no distension, no ascites and no mass. There is no hepatosplenomegaly. There is no tenderness. There is no rigidity, no rebound and no guarding. No hernia.   Musculoskeletal:        Right hip: He exhibits tenderness and bony tenderness. He exhibits normal range of motion, normal strength, no swelling, no crepitus, no deformity and no laceration.        Left hip: He exhibits tenderness and bony tenderness. He exhibits normal range of motion, normal strength, no swelling, no crepitus, no deformity and no laceration.        Cervical back: He exhibits decreased range of motion and pain. He exhibits no tenderness, no bony tenderness, no swelling, no edema, no deformity, no laceration, no spasm and normal pulse.        Lumbar back: He exhibits decreased range of motion, bony tenderness and pain. He exhibits no tenderness, no swelling, no edema, no deformity, no laceration, no spasm and normal pulse.        Right hand: He exhibits decreased range of motion, tenderness, bony tenderness, deformity and swelling. He exhibits normal two-point discrimination, normal capillary refill and no laceration. Normal sensation noted. Decreased sensation is not present in the ulnar distribution and is not present in the medial distribution. Decreased strength noted. He exhibits finger abduction and thumb/finger opposition. He exhibits no wrist extension trouble.        Right foot: There is decreased range of motion, tenderness and swelling. There is no bony tenderness, normal capillary refill, no crepitus, no deformity and no laceration.        Left foot: There is decreased range of motion, tenderness and swelling. There is no bony tenderness,  normal capillary refill, no crepitus, no deformity and no laceration.   Lymphadenopathy:        Head (right side): No submental, no submandibular, no preauricular and no posterior auricular adenopathy present.        Head (left side): No submental, no submandibular, no preauricular and no posterior auricular adenopathy present.     He has no cervical adenopathy.   Neurological: He is alert and oriented to person, place, and time. He has normal strength. He displays no atrophy and no tremor. No cranial nerve deficit or sensory deficit. He exhibits normal muscle tone. He displays no seizure activity. Coordination and gait normal.   Skin: Skin is warm, dry and intact. No rash noted. He is not diaphoretic. No erythema. No pallor.   Covered in multiple tattoos over face, torso, and arms.   Psychiatric: He has a normal mood and affect. His speech is normal and behavior is normal. Judgment and thought content normal. His mood appears not anxious. His affect is not angry, not blunt, not labile and not inappropriate. Cognition and memory are normal. He does not exhibit a depressed mood. He is attentive.   Vitals reviewed.      Assessment & Plan    Discussion of plan of care including treatment options regarding health and wellness were reviewed and discussed with patient.  Any changes to medication or treatment plan, as well as any screening blood test, imaging, or referrals to specialist, are documented.  Follow up as indicated.     1. Ankylosing spondylitis of multiple sites in spine  Discussed rules regarding chronic pain management with opiate/opioid therapy.  Discussed use of alternative medications to manage pain with goal of reducing and possibly discontinue opioid therapy, and advised that in order to continue current therapy they would need to schedule appointment at least once every 90 days, as well as consent to random urine drug screening.  The legitimate medical purpose of using a controlled substance for pain  management is chronic pain due to AS.  Patient has been screened through the [facility] and is not receiving controlled substances from any other provider.  At this time, I have no suspicion of illegal activity and feel that with proper usage, there is low risk for overdose.     - oxyCODONE-acetaminophen (PERCOCET)  mg per tablet; Take 1 tablet by mouth 3 (three) times daily as needed.  Dispense: 90 tablet; Refill: 0  - oxyCODONE-acetaminophen (PERCOCET)  mg per tablet; Take 1 tablet by mouth 3 (three) times daily as needed.  Dispense: 90 tablet; Refill: 0  - oxyCODONE-acetaminophen (PERCOCET)  mg per tablet; Take 1 tablet by mouth 3 (three) times daily as needed.  Dispense: 90 tablet; Refill: 0  - Ambulatory referral/consult to Physical/Occupational Therapy; Future    2. Degeneration of lumbar or lumbosacral intervertebral disc  The current medical regimen will be continued at this time as discussed.    - oxyCODONE-acetaminophen (PERCOCET)  mg per tablet; Take 1 tablet by mouth 3 (three) times daily as needed.  Dispense: 90 tablet; Refill: 0  - oxyCODONE-acetaminophen (PERCOCET)  mg per tablet; Take 1 tablet by mouth 3 (three) times daily as needed.  Dispense: 90 tablet; Refill: 0  - oxyCODONE-acetaminophen (PERCOCET)  mg per tablet; Take 1 tablet by mouth 3 (three) times daily as needed.  Dispense: 90 tablet; Refill: 0  - Ambulatory referral/consult to Physical/Occupational Therapy; Future    3. Crohn's disease of large intestine without complication  Stable; no therapeutic changes at this time.     4. Inflammatory polyarthritis  The current medical regimen will be continued at this time as discussed.  Referral to appropriate specialist for evaluation and management placed in [facility].   - Ambulatory referral/consult to Physical/Occupational Therapy; Future    5. Opioid dependence, continuous  Stable; no therapeutic changes at this time.        Follow up in about 3 months (around  5/21/2020), or if symptoms worsen or fail to improve.        ACTIVE MEDICAL ISSUES:  Documented in Problem List    PAST MEDICAL HISTORY  Documented    PAST SURGICAL HISTORY:  Documented    SOCIAL HISTORY:  Documented    FAMILY HISTORY:  Documented    ALLERGIES AND MEDICATIONS: updated and reviewed.  Documented    Health Maintenance       Date Due Completion Date    TETANUS VACCINE 06/23/1988 ---    Sign Pain Contract 06/23/1988 ---    Naloxone Prescription 06/23/1988 ---    Lipid Panel 06/20/2019 6/20/2018    Urine Drug Screen 09/08/2019 3/8/2019

## 2020-03-05 RX ORDER — FAMOTIDINE 40 MG/1
40 TABLET, FILM COATED ORAL EVERY 12 HOURS
Qty: 30 TABLET | Refills: 5 | Status: SHIPPED | OUTPATIENT
Start: 2020-03-05 | End: 2020-07-08 | Stop reason: SDUPTHER

## 2020-03-05 NOTE — TELEPHONE ENCOUNTER
Last Office Visit Info:   The patient's last visit with Azikiwe K Lombard, MD was on 2/21/2020.    The patient's last visit in current department was on 2/21/2020.        Last CBC Results:   Lab Results   Component Value Date    WBC 5.70 09/19/2019    HGB 11.2 (L) 09/19/2019    HCT 36.8 (L) 09/19/2019     09/19/2019       Last CMP Results  Lab Results   Component Value Date     09/19/2019    K 3.8 09/19/2019     09/19/2019    CO2 26 09/19/2019    BUN 15 09/19/2019    CREATININE 1.1 09/19/2019    CALCIUM 8.9 09/19/2019    ALBUMIN 3.8 09/19/2019    AST 23 09/19/2019    ALT 15 09/19/2019       Last Lipids  Lab Results   Component Value Date    CHOL 212 (H) 06/20/2018    TRIG 72 06/20/2018    HDL 51 06/20/2018    LDLCALC 146.6 06/20/2018       Last A1C  No results found for: HGBA1C    Last TSH  Lab Results   Component Value Date    TSH 1.453 12/16/2014         Current Med Refills  Medication List with Changes/Refills   Current Medications    ADALIMUMAB (HUMIRA PEN) 40 MG/0.8 ML PNKT    Inject 1 pen (40 mg total) into the skin every 7 days.       Start Date: 11/21/2019End Date: 2/21/2020    AMLODIPINE (NORVASC) 5 MG TABLET    Take 1 tablet (5 mg total) by mouth once daily.       Start Date: 12/4/2019 End Date: --    BACK BRACE MISC    1 each by Misc.(Non-Drug; Combo Route) route Daily.       Start Date: 5/12/2016 End Date: --    CHOLECALCIFEROL, VITAMIN D3, (DECARA) 50,000 UNIT CAPSULE    Take 1 capsule (50,000 Units total) by mouth twice a week. X 20 weeks followed by OTC vitamin D3 1000 IU daily.       Start Date: 9/19/2019 End Date: --    CLONAZEPAM (KLONOPIN) 1 MG TABLET    TAKE 1/2 TABLET BY MOUTH TWICE DAILY AND 1 TABLET AT BEDTIME       Start Date: 6/19/2018 End Date: --    CLOTRIMAZOLE (LOTRIMIN) 1 % SOLN    APPLY A DROP BETWEEN THE TOES TWICE DAILY       Start Date: 6/1/2019  End Date: --    FAMOTIDINE (PEPCID) 20 MG TABLET           Start Date: 2/17/2020 End Date: --    FAMOTIDINE (PEPCID) 40  MG TABLET    Take 1 tablet (40 mg total) by mouth every 12 (twelve) hours.       Start Date: 11/19/2019End Date: --    OXYCODONE-ACETAMINOPHEN (PERCOCET)  MG PER TABLET    Take 1 tablet by mouth 3 (three) times daily as needed.       Start Date: 2/27/2020 End Date: 3/28/2020    OXYCODONE-ACETAMINOPHEN (PERCOCET)  MG PER TABLET    Take 1 tablet by mouth 3 (three) times daily as needed.       Start Date: 3/27/2020 End Date: 4/26/2020    OXYCODONE-ACETAMINOPHEN (PERCOCET)  MG PER TABLET    Take 1 tablet by mouth 3 (three) times daily as needed.       Start Date: 4/27/2020 End Date: 5/27/2020    PRAVASTATIN (PRAVACHOL) 20 MG TABLET    Take 20 mg by mouth nightly.       Start Date: 10/10/2015End Date: --    SULFAMETHOXAZOLE-TRIMETHOPRIM 800-160MG (BACTRIM DS) 800-160 MG TAB    TAKE ONE TABLET BY MOUTH EVERY TWELVE HOURS FOR 10 DAYS       Start Date: 11/19/2019End Date: --    TERBINAFINE HCL (LAMISIL AT) 1 % CREAM    Apply topically 2 (two) times daily.       Start Date: 6/11/2019 End Date: --    TRIAMCINOLONE (KENALOG) 0.5 % OINTMENT    Apply topically 2 (two) times daily.       Start Date: 1/24/2020 End Date: --       Order(s) placed per written order guidelines:     Please advise.

## 2020-03-09 DIAGNOSIS — M47.899 HLA-B27 SPONDYLOARTHROPATHY: ICD-10-CM

## 2020-03-09 DIAGNOSIS — M06.4 INFLAMMATORY POLYARTHRITIS: ICD-10-CM

## 2020-03-09 DIAGNOSIS — M45.0 ANKYLOSING SPONDYLITIS OF MULTIPLE SITES IN SPINE: ICD-10-CM

## 2020-03-09 DIAGNOSIS — H20.021 IRITIS, RECURRENT, RIGHT: ICD-10-CM

## 2020-03-09 RX ORDER — ADALIMUMAB 40MG/0.8ML
40 KIT SUBCUTANEOUS
Qty: 12 PEN | Refills: 2 | Status: SHIPPED | OUTPATIENT
Start: 2020-03-09 | End: 2020-05-27 | Stop reason: SDUPTHER

## 2020-03-12 ENCOUNTER — DOCUMENTATION ONLY (OUTPATIENT)
Dept: REHABILITATION | Facility: OTHER | Age: 50
End: 2020-03-12

## 2020-03-12 NOTE — PROGRESS NOTES
Physical Therapy No Show Note       Patient was scheduled for physical therapy initial evaluation at [facility] for 3/12/2020. Pt failed to appear for appointment without prior notification for today. Pt will need to contact the clinic to reschedule evaluation as needed.        Ada Cash, PT

## 2020-03-31 ENCOUNTER — PATIENT OUTREACH (OUTPATIENT)
Dept: ADMINISTRATIVE | Facility: OTHER | Age: 50
End: 2020-03-31

## 2020-04-01 ENCOUNTER — OFFICE VISIT (OUTPATIENT)
Dept: RHEUMATOLOGY | Facility: CLINIC | Age: 50
End: 2020-04-01
Payer: MEDICARE

## 2020-04-01 DIAGNOSIS — M06.4 INFLAMMATORY POLYARTHRITIS: ICD-10-CM

## 2020-04-01 DIAGNOSIS — Z55.9 EDUCATIONAL CIRCUMSTANCES: ICD-10-CM

## 2020-04-01 DIAGNOSIS — M47.899 HLA-B27 SPONDYLOARTHROPATHY: Primary | ICD-10-CM

## 2020-04-01 DIAGNOSIS — E55.9 VITAMIN D INSUFFICIENCY: ICD-10-CM

## 2020-04-01 DIAGNOSIS — Z79.60 LONG-TERM USE OF IMMUNOSUPPRESSANT MEDICATION: ICD-10-CM

## 2020-04-01 DIAGNOSIS — M81.0 OSTEOPOROSIS WITHOUT CURRENT PATHOLOGICAL FRACTURE, UNSPECIFIED OSTEOPOROSIS TYPE: ICD-10-CM

## 2020-04-01 DIAGNOSIS — E55.9 MILD VITAMIN D DEFICIENCY: ICD-10-CM

## 2020-04-01 PROCEDURE — 99213 PR OFFICE/OUTPT VISIT, EST, LEVL III, 20-29 MIN: ICD-10-PCS | Mod: S$PBB,,, | Performed by: INTERNAL MEDICINE

## 2020-04-01 PROCEDURE — 99211 OFF/OP EST MAY X REQ PHY/QHP: CPT | Mod: PBBFAC | Performed by: INTERNAL MEDICINE

## 2020-04-01 PROCEDURE — 99999 PR PBB SHADOW E&M-EST. PATIENT-LVL I: CPT | Mod: PBBFAC,,, | Performed by: INTERNAL MEDICINE

## 2020-04-01 PROCEDURE — 99999 PR PBB SHADOW E&M-EST. PATIENT-LVL I: ICD-10-PCS | Mod: PBBFAC,,, | Performed by: INTERNAL MEDICINE

## 2020-04-01 PROCEDURE — 99213 OFFICE O/P EST LOW 20 MIN: CPT | Mod: S$PBB,,, | Performed by: INTERNAL MEDICINE

## 2020-04-01 SDOH — SOCIAL DETERMINANTS OF HEALTH (SDOH): PROBLEMS RELATED TO EDUCATION AND LITERACY, UNSPECIFIED: Z55.9

## 2020-04-06 DIAGNOSIS — E55.9 VITAMIN D INSUFFICIENCY: ICD-10-CM

## 2020-04-06 RX ORDER — ASPIRIN 325 MG
50000 TABLET, DELAYED RELEASE (ENTERIC COATED) ORAL
Qty: 40 CAPSULE | Refills: 0 | Status: SHIPPED | OUTPATIENT
Start: 2020-04-06 | End: 2020-07-08 | Stop reason: SDUPTHER

## 2020-05-05 DIAGNOSIS — I10 ESSENTIAL HYPERTENSION: ICD-10-CM

## 2020-05-05 RX ORDER — AMLODIPINE BESYLATE 5 MG/1
5 TABLET ORAL DAILY
Qty: 30 TABLET | Refills: 5 | Status: SHIPPED | OUTPATIENT
Start: 2020-05-05 | End: 2020-07-08 | Stop reason: SDUPTHER

## 2020-05-05 NOTE — TELEPHONE ENCOUNTER
Last Office Visit Info:   The patient's last visit with Azikiwe K Lombard, MD was on 2/21/2020.    The patient's last visit in current department was on 2/21/2020.        Last CBC Results:   Lab Results   Component Value Date    WBC 5.70 09/19/2019    HGB 11.2 (L) 09/19/2019    HCT 36.8 (L) 09/19/2019     09/19/2019       Last CMP Results  Lab Results   Component Value Date     09/19/2019    K 3.8 09/19/2019     09/19/2019    CO2 26 09/19/2019    BUN 15 09/19/2019    CREATININE 1.1 09/19/2019    CALCIUM 8.9 09/19/2019    ALBUMIN 3.8 09/19/2019    AST 23 09/19/2019    ALT 15 09/19/2019       Last Lipids  Lab Results   Component Value Date    CHOL 212 (H) 06/20/2018    TRIG 72 06/20/2018    HDL 51 06/20/2018    LDLCALC 146.6 06/20/2018       Last A1C  No results found for: HGBA1C    Last TSH  Lab Results   Component Value Date    TSH 1.453 12/16/2014         Current Med Refills  Medication List with Changes/Refills   Current Medications    ADALIMUMAB (HUMIRA PEN) 40 MG/0.8 ML PNKT    Inject 1 pen (40 mg total) into the skin every 7 days.       Start Date: 3/9/2020  End Date: 6/1/2020    AMLODIPINE (NORVASC) 5 MG TABLET    Take 1 tablet (5 mg total) by mouth once daily.       Start Date: 12/4/2019 End Date: --    BACK BRACE MISC    1 each by Misc.(Non-Drug; Combo Route) route Daily.       Start Date: 5/12/2016 End Date: --    CHOLECALCIFEROL, VITAMIN D3, (DECARA) 1,250 MCG (50,000 UNIT) CAPSULE    Take 1 capsule (50,000 Units total) by mouth twice a week. X 20 weeks followed by OTC vitamin D3 1000 IU daily.       Start Date: 4/6/2020  End Date: --    CLONAZEPAM (KLONOPIN) 1 MG TABLET    TAKE 1/2 TABLET BY MOUTH TWICE DAILY AND 1 TABLET AT BEDTIME       Start Date: 6/19/2018 End Date: --    CLOTRIMAZOLE (LOTRIMIN) 1 % SOLN    APPLY A DROP BETWEEN THE TOES TWICE DAILY       Start Date: 6/1/2019  End Date: --    FAMOTIDINE (PEPCID) 40 MG TABLET    Take 1 tablet (40 mg total) by mouth every 12 (twelve)  hours.       Start Date: 3/5/2020  End Date: --    OXYCODONE-ACETAMINOPHEN (PERCOCET)  MG PER TABLET    Take 1 tablet by mouth 3 (three) times daily as needed.       Start Date: 4/27/2020 End Date: 5/27/2020    PRAVASTATIN (PRAVACHOL) 20 MG TABLET    Take 20 mg by mouth nightly.       Start Date: 10/10/2015End Date: --    SULFAMETHOXAZOLE-TRIMETHOPRIM 800-160MG (BACTRIM DS) 800-160 MG TAB    TAKE ONE TABLET BY MOUTH EVERY TWELVE HOURS FOR 10 DAYS       Start Date: 11/19/2019End Date: --    TERBINAFINE HCL (LAMISIL AT) 1 % CREAM    Apply topically 2 (two) times daily.       Start Date: 6/11/2019 End Date: --    TRIAMCINOLONE (KENALOG) 0.5 % OINTMENT    Apply topically 2 (two) times daily.       Start Date: 1/24/2020 End Date: --       Order(s) placed per written order guidelines:     Please advise.

## 2020-05-21 ENCOUNTER — TELEPHONE (OUTPATIENT)
Dept: FAMILY MEDICINE | Facility: CLINIC | Age: 50
End: 2020-05-21

## 2020-05-21 DIAGNOSIS — M51.37 DEGENERATION OF LUMBAR OR LUMBOSACRAL INTERVERTEBRAL DISC: ICD-10-CM

## 2020-05-21 DIAGNOSIS — M45.0 ANKYLOSING SPONDYLITIS OF MULTIPLE SITES IN SPINE: ICD-10-CM

## 2020-05-21 RX ORDER — OXYCODONE AND ACETAMINOPHEN 10; 325 MG/1; MG/1
1 TABLET ORAL 3 TIMES DAILY PRN
Qty: 90 TABLET | Refills: 0 | Status: SHIPPED | OUTPATIENT
Start: 2020-05-21 | End: 2020-07-27 | Stop reason: SDUPTHER

## 2020-05-21 RX ORDER — CLONAZEPAM 2 MG/1
TABLET ORAL
COMMUNITY
Start: 2020-05-04 | End: 2021-11-02 | Stop reason: SDUPTHER

## 2020-05-21 NOTE — TELEPHONE ENCOUNTER
----- Message from Kary Patel sent at 5/21/2020 10:56 AM CDT -----  Contact: JACOBO GUEVARA   Name of Who is Calling: JACOBO GUEVARA       What is the request in detail:  Patient is requesting a call back concerning his medication he was advised he was unable to see any other doctor to get his medication refilled         Can the clinic reply by [facility]: no      What Number to Call Back if not in [facility]: [phone]

## 2020-05-21 NOTE — TELEPHONE ENCOUNTER
LOV 02/21/2020, Patient has been scheduled for 07/08/2020. Patient is requesting a refill on [facility] medication. Please advise.

## 2020-05-21 NOTE — TELEPHONE ENCOUNTER
Spoke with patient was told medication still pending we will call him back as soon will be approve , patient verbalized understand .

## 2020-05-26 DIAGNOSIS — E55.9 VITAMIN D INSUFFICIENCY: ICD-10-CM

## 2020-05-26 RX ORDER — ASPIRIN 325 MG
50000 TABLET, DELAYED RELEASE (ENTERIC COATED) ORAL
Qty: 40 CAPSULE | Refills: 0 | OUTPATIENT
Start: 2020-05-28

## 2020-05-26 NOTE — TELEPHONE ENCOUNTER
----- Message from Stan Shipman sent at 5/26/2020  1:49 PM CDT -----  Contact: Beau [phone]  Type: RX Refill Request    Who Called: Beau     Refill or New Rx: refill     RX Name and Strength: amLODIPine (NORVASC) 5 MG tablet, oxyCODONE-acetaminophen (PERCOCET)  mg per tablet, famotidine (PEPCID) 40 MG tablet, pravastatin (PRAVACHOL) 20 MG tablet    Is this a 30 day or 90 day RX:30 day     Preferred Pharmacy with phone number: [facility] - [facility] [address]    Would the patient rather a call back or a response via [facility]? Call back     Best Call Back Number:[phone]    Additional Information: The patient stated that he has called several times to get the medication refilled. He is completely out and sick.

## 2020-05-27 DIAGNOSIS — M47.899 HLA-B27 SPONDYLOARTHROPATHY: ICD-10-CM

## 2020-05-27 DIAGNOSIS — M45.0 ANKYLOSING SPONDYLITIS OF MULTIPLE SITES IN SPINE: ICD-10-CM

## 2020-05-27 DIAGNOSIS — M06.4 INFLAMMATORY POLYARTHRITIS: ICD-10-CM

## 2020-05-27 DIAGNOSIS — H20.021 IRITIS, RECURRENT, RIGHT: ICD-10-CM

## 2020-05-27 RX ORDER — ADALIMUMAB 40MG/0.8ML
40 KIT SUBCUTANEOUS
Qty: 12 PEN | Refills: 2 | Status: SHIPPED | OUTPATIENT
Start: 2020-05-27 | End: 2020-07-08 | Stop reason: SDUPTHER

## 2020-05-28 RX ORDER — PRAVASTATIN SODIUM 20 MG/1
20 TABLET ORAL NIGHTLY
Qty: 30 TABLET | Refills: 3 | Status: SHIPPED | OUTPATIENT
Start: 2020-05-28 | End: 2020-07-08 | Stop reason: SDUPTHER

## 2020-06-23 DIAGNOSIS — L30.9 CHRONIC ECZEMA: ICD-10-CM

## 2020-06-23 NOTE — TELEPHONE ENCOUNTER
Last Office Visit Info:   The patient's last visit with Azikiwe K Lombard, MD was on 2/21/2020.    The patient's last visit in current department was on 2/21/2020.        Last CBC Results:   Lab Results   Component Value Date    WBC 5.70 09/19/2019    HGB 11.2 (L) 09/19/2019    HCT 36.8 (L) 09/19/2019     09/19/2019       Last CMP Results  Lab Results   Component Value Date     09/19/2019    K 3.8 09/19/2019     09/19/2019    CO2 26 09/19/2019    BUN 15 09/19/2019    CREATININE 1.1 09/19/2019    CALCIUM 8.9 09/19/2019    ALBUMIN 3.8 09/19/2019    AST 23 09/19/2019    ALT 15 09/19/2019       Last Lipids  Lab Results   Component Value Date    CHOL 212 (H) 06/20/2018    TRIG 72 06/20/2018    HDL 51 06/20/2018    LDLCALC 146.6 06/20/2018       Last A1C  No results found for: HGBA1C    Last TSH  Lab Results   Component Value Date    TSH 1.453 12/16/2014         Current Med Refills  Medication List with Changes/Refills   Current Medications    ADALIMUMAB (HUMIRA PEN) 40 MG/0.8 ML PNKT    Inject 1 pen (40 mg total) into the skin every 7 days.       Start Date: 5/27/2020 End Date: 8/19/2020    AMLODIPINE (NORVASC) 5 MG TABLET    Take 1 tablet (5 mg total) by mouth once daily.       Start Date: 5/5/2020  End Date: --    BACK BRACE MISC    1 each by Misc.(Non-Drug; Combo Route) route Daily.       Start Date: 5/12/2016 End Date: --    CHOLECALCIFEROL, VITAMIN D3, (DECARA) 1,250 MCG (50,000 UNIT) CAPSULE    Take 1 capsule (50,000 Units total) by mouth twice a week. X 20 weeks followed by OTC vitamin D3 1000 IU daily.       Start Date: 4/6/2020  End Date: --    CLONAZEPAM (KLONOPIN) 1 MG TABLET    TAKE 1/2 TABLET BY MOUTH TWICE DAILY AND 1 TABLET AT BEDTIME       Start Date: 6/19/2018 End Date: --    CLONAZEPAM (KLONOPIN) 2 MG TAB           Start Date: 5/4/2020  End Date: --    CLOTRIMAZOLE (LOTRIMIN) 1 % SOLN    APPLY A DROP BETWEEN THE TOES TWICE DAILY       Start Date: 6/1/2019  End Date: --    FAMOTIDINE  (PEPCID) 40 MG TABLET    Take 1 tablet (40 mg total) by mouth every 12 (twelve) hours.       Start Date: 3/5/2020  End Date: --    PRAVASTATIN (PRAVACHOL) 20 MG TABLET    Take 1 tablet (20 mg total) by mouth nightly.       Start Date: 5/28/2020 End Date: --    SULFAMETHOXAZOLE-TRIMETHOPRIM 800-160MG (BACTRIM DS) 800-160 MG TAB    TAKE ONE TABLET BY MOUTH EVERY TWELVE HOURS FOR 10 DAYS       Start Date: 11/19/2019End Date: --    TERBINAFINE HCL (LAMISIL AT) 1 % CREAM    Apply topically 2 (two) times daily.       Start Date: 6/11/2019 End Date: --    TRIAMCINOLONE (KENALOG) 0.5 % OINTMENT    Apply topically 2 (two) times daily.       Start Date: 1/24/2020 End Date: --       Order(s) placed per written order guidelines:     Please advise.

## 2020-06-24 RX ORDER — TRIAMCINOLONE ACETONIDE 5 MG/G
OINTMENT TOPICAL 2 TIMES DAILY
Qty: 30 G | Refills: 5 | Status: SHIPPED | OUTPATIENT
Start: 2020-06-24 | End: 2020-07-08 | Stop reason: SDUPTHER

## 2020-07-06 ENCOUNTER — TELEPHONE (OUTPATIENT)
Dept: FAMILY MEDICINE | Facility: CLINIC | Age: 50
End: 2020-07-06

## 2020-07-06 RX ORDER — OXYCODONE AND ACETAMINOPHEN 10; 325 MG/1; MG/1
1 TABLET ORAL 3 TIMES DAILY PRN
Qty: 90 TABLET | Refills: 0 | Status: CANCELLED | OUTPATIENT
Start: 2020-07-06

## 2020-07-06 NOTE — TELEPHONE ENCOUNTER
----- Message from Chantel Rubin sent at 7/6/2020  2:15 PM CDT -----  Contact: Self  [phone]  Type: Patient Call Back    Who called: Self    What is the request in detail: calling to speak with the nurse he states that he was told to call back at 2pm    Can the clinic reply by [facility]? Call back    Would the patient rather a call back or a response via [facility]? Call back    Best call back number: [phone]

## 2020-07-06 NOTE — TELEPHONE ENCOUNTER
Pt appt needed to be rescheduled due to provider out of office; pt states he has been without pain medication for 2 weeks and is asking for refill to be sent to pharmacy; next available appointment not until end of august; please advise

## 2020-07-06 NOTE — TELEPHONE ENCOUNTER
Informed pt we will call him once we get response from Dr Lombard if he will fill the pain medication or not; pt verbalized understanding

## 2020-07-07 DIAGNOSIS — M06.4 INFLAMMATORY POLYARTHRITIS: ICD-10-CM

## 2020-07-07 DIAGNOSIS — K21.9 GASTROESOPHAGEAL REFLUX DISEASE WITHOUT ESOPHAGITIS: ICD-10-CM

## 2020-07-07 DIAGNOSIS — K50.10 CROHN'S DISEASE OF LARGE INTESTINE WITHOUT COMPLICATION: Primary | ICD-10-CM

## 2020-07-07 DIAGNOSIS — H20.021 IRITIS, RECURRENT, RIGHT: ICD-10-CM

## 2020-07-07 DIAGNOSIS — M47.899 HLA-B27 SPONDYLOARTHROPATHY: ICD-10-CM

## 2020-07-07 DIAGNOSIS — E55.9 VITAMIN D INSUFFICIENCY: ICD-10-CM

## 2020-07-07 DIAGNOSIS — B36.9 FUNGAL DERMATITIS: ICD-10-CM

## 2020-07-07 DIAGNOSIS — B35.3 TINEA PEDIS OF BOTH FEET: ICD-10-CM

## 2020-07-07 DIAGNOSIS — L30.9 CHRONIC ECZEMA: ICD-10-CM

## 2020-07-07 DIAGNOSIS — M51.37 DEGENERATION OF LUMBAR OR LUMBOSACRAL INTERVERTEBRAL DISC: ICD-10-CM

## 2020-07-07 DIAGNOSIS — I10 ESSENTIAL HYPERTENSION: ICD-10-CM

## 2020-07-07 DIAGNOSIS — M45.0 ANKYLOSING SPONDYLITIS OF MULTIPLE SITES IN SPINE: ICD-10-CM

## 2020-07-07 DIAGNOSIS — E78.5 HYPERLIPIDEMIA, ACQUIRED: ICD-10-CM

## 2020-07-07 NOTE — TELEPHONE ENCOUNTER
----- Message from Lula Ratliff sent at 7/7/2020  3:05 PM CDT -----  Type: Patient Call Back    Who called: Self     What is the request in detail: patient called to check the status of  medication refill. Please call     Can the clinic reply by [facility]? No     Would the patient rather a call back or a response via [facility]?  Call     Best call back number:[phone] (home)

## 2020-07-07 NOTE — TELEPHONE ENCOUNTER
Spoke with patient was told medication still pending provider is out of the office we will call him once we get response from dr. Lombard

## 2020-07-08 RX ORDER — OXYCODONE AND ACETAMINOPHEN 10; 325 MG/1; MG/1
1 TABLET ORAL 3 TIMES DAILY PRN
Qty: 90 TABLET | Refills: 0 | Status: CANCELLED | OUTPATIENT
Start: 2020-07-08 | End: 2020-08-07

## 2020-07-08 RX ORDER — CLONAZEPAM 2 MG/1
TABLET ORAL
Status: CANCELLED | OUTPATIENT
Start: 2020-07-08

## 2020-07-08 NOTE — TELEPHONE ENCOUNTER
----- Message from Shreya Garcia sent at 7/8/2020 12:23 PM CDT -----  Pt needs new script for all his meds, he lives in [place], pls send meds to pharmacy in [place], pls call patient if needs more info and when meds are called to pharmacy.

## 2020-07-10 RX ORDER — PRENATAL VIT 91/IRON/FOLIC/DHA 28-975-200
COMBINATION PACKAGE (EA) ORAL 2 TIMES DAILY
Qty: 30 G | Refills: 5 | Status: SHIPPED | OUTPATIENT
Start: 2020-07-10 | End: 2021-04-16

## 2020-07-10 RX ORDER — TRIAMCINOLONE ACETONIDE 5 MG/G
OINTMENT TOPICAL 2 TIMES DAILY
Qty: 30 G | Refills: 5 | Status: SHIPPED | OUTPATIENT
Start: 2020-07-10 | End: 2020-11-20 | Stop reason: SDUPTHER

## 2020-07-10 RX ORDER — PRAVASTATIN SODIUM 20 MG/1
20 TABLET ORAL NIGHTLY
Qty: 30 TABLET | Refills: 5 | Status: SHIPPED | OUTPATIENT
Start: 2020-07-10 | End: 2020-08-10

## 2020-07-10 RX ORDER — FAMOTIDINE 40 MG/1
40 TABLET, FILM COATED ORAL EVERY 12 HOURS
Qty: 30 TABLET | Refills: 5 | Status: SHIPPED | OUTPATIENT
Start: 2020-07-10 | End: 2020-07-27 | Stop reason: SDUPTHER

## 2020-07-10 RX ORDER — ASPIRIN 325 MG
50000 TABLET, DELAYED RELEASE (ENTERIC COATED) ORAL DAILY
Qty: 30 CAPSULE | Refills: 5 | Status: SHIPPED | OUTPATIENT
Start: 2020-07-10 | End: 2021-02-12

## 2020-07-10 RX ORDER — ADALIMUMAB 40MG/0.8ML
40 KIT SUBCUTANEOUS
Qty: 12 PEN | Refills: 2 | Status: SHIPPED | OUTPATIENT
Start: 2020-07-10 | End: 2020-12-03 | Stop reason: SDUPTHER

## 2020-07-10 RX ORDER — AMLODIPINE BESYLATE 5 MG/1
5 TABLET ORAL DAILY
Qty: 30 TABLET | Refills: 5 | Status: SHIPPED | OUTPATIENT
Start: 2020-07-10 | End: 2020-10-01 | Stop reason: SDUPTHER

## 2020-07-13 ENCOUNTER — PATIENT OUTREACH (OUTPATIENT)
Dept: ADMINISTRATIVE | Facility: HOSPITAL | Age: 50
End: 2020-07-13

## 2020-07-13 DIAGNOSIS — Z12.11 SCREEN FOR COLON CANCER: Primary | ICD-10-CM

## 2020-07-16 DIAGNOSIS — Z12.11 SCREEN FOR COLON CANCER: ICD-10-CM

## 2020-07-17 DIAGNOSIS — Z71.89 COMPLEX CARE COORDINATION: ICD-10-CM

## 2020-07-27 ENCOUNTER — LAB VISIT (OUTPATIENT)
Dept: LAB | Facility: HOSPITAL | Age: 50
End: 2020-07-27
Attending: FAMILY MEDICINE
Payer: MEDICARE

## 2020-07-27 ENCOUNTER — OFFICE VISIT (OUTPATIENT)
Dept: FAMILY MEDICINE | Facility: CLINIC | Age: 50
End: 2020-07-27
Payer: MEDICARE

## 2020-07-27 VITALS
OXYGEN SATURATION: 98 % | BODY MASS INDEX: 29.65 KG/M2 | WEIGHT: 177.94 LBS | RESPIRATION RATE: 20 BRPM | HEART RATE: 79 BPM | DIASTOLIC BLOOD PRESSURE: 90 MMHG | HEIGHT: 65 IN | SYSTOLIC BLOOD PRESSURE: 150 MMHG | TEMPERATURE: 98 F

## 2020-07-27 DIAGNOSIS — E55.9 VITAMIN D INSUFFICIENCY: ICD-10-CM

## 2020-07-27 DIAGNOSIS — Z86.39 PERSONAL HISTORY OF OTHER ENDOCRINE, NUTRITIONAL AND METABOLIC DISEASE: ICD-10-CM

## 2020-07-27 DIAGNOSIS — M51.37 DEGENERATION OF LUMBAR OR LUMBOSACRAL INTERVERTEBRAL DISC: ICD-10-CM

## 2020-07-27 DIAGNOSIS — M45.0 ANKYLOSING SPONDYLITIS OF MULTIPLE SITES IN SPINE: Primary | ICD-10-CM

## 2020-07-27 DIAGNOSIS — M47.899 HLA-B27 SPONDYLOARTHROPATHY: ICD-10-CM

## 2020-07-27 DIAGNOSIS — E78.5 HYPERLIPIDEMIA, ACQUIRED: ICD-10-CM

## 2020-07-27 DIAGNOSIS — I10 ESSENTIAL HYPERTENSION: ICD-10-CM

## 2020-07-27 DIAGNOSIS — K21.9 GASTROESOPHAGEAL REFLUX DISEASE WITHOUT ESOPHAGITIS: ICD-10-CM

## 2020-07-27 DIAGNOSIS — Z12.5 SPECIAL SCREENING FOR MALIGNANT NEOPLASM OF PROSTATE: ICD-10-CM

## 2020-07-27 LAB
25(OH)D3+25(OH)D2 SERPL-MCNC: 24 NG/ML (ref 30–96)
ALBUMIN SERPL BCP-MCNC: 3.8 G/DL (ref 3.5–5.2)
ALP SERPL-CCNC: 77 U/L (ref 55–135)
ALT SERPL W/O P-5'-P-CCNC: 22 U/L (ref 10–44)
ANION GAP SERPL CALC-SCNC: 9 MMOL/L (ref 8–16)
AST SERPL-CCNC: 23 U/L (ref 10–40)
BILIRUB SERPL-MCNC: 0.4 MG/DL (ref 0.1–1)
BUN SERPL-MCNC: 17 MG/DL (ref 6–20)
CALCIUM SERPL-MCNC: 8.7 MG/DL (ref 8.7–10.5)
CHLORIDE SERPL-SCNC: 109 MMOL/L (ref 95–110)
CHOLEST SERPL-MCNC: 225 MG/DL (ref 120–199)
CHOLEST SERPL-MCNC: 225 MG/DL (ref 120–199)
CHOLEST/HDLC SERPL: 4.2 {RATIO} (ref 2–5)
CHOLEST/HDLC SERPL: 4.2 {RATIO} (ref 2–5)
CO2 SERPL-SCNC: 22 MMOL/L (ref 23–29)
COMPLEXED PSA SERPL-MCNC: 0.74 NG/ML (ref 0–4)
CREAT SERPL-MCNC: 1.2 MG/DL (ref 0.5–1.4)
CRP SERPL-MCNC: 3.4 MG/L (ref 0–8.2)
EST. GFR  (AFRICAN AMERICAN): >60 ML/MIN/1.73 M^2
EST. GFR  (NON AFRICAN AMERICAN): >60 ML/MIN/1.73 M^2
GLUCOSE SERPL-MCNC: 109 MG/DL (ref 70–110)
HDLC SERPL-MCNC: 54 MG/DL (ref 40–75)
HDLC SERPL-MCNC: 54 MG/DL (ref 40–75)
HDLC SERPL: 24 % (ref 20–50)
HDLC SERPL: 24 % (ref 20–50)
LDLC SERPL CALC-MCNC: 155.2 MG/DL (ref 63–159)
LDLC SERPL CALC-MCNC: 155.4 MG/DL (ref 63–159)
NONHDLC SERPL-MCNC: 171 MG/DL
NONHDLC SERPL-MCNC: 171 MG/DL
POTASSIUM SERPL-SCNC: 3.7 MMOL/L (ref 3.5–5.1)
PROT SERPL-MCNC: 7.3 G/DL (ref 6–8.4)
SODIUM SERPL-SCNC: 140 MMOL/L (ref 136–145)
T4 FREE SERPL-MCNC: 1.07 NG/DL (ref 0.71–1.51)
TRIGL SERPL-MCNC: 78 MG/DL (ref 30–150)
TRIGL SERPL-MCNC: 79 MG/DL (ref 30–150)
TSH SERPL DL<=0.005 MIU/L-ACNC: 1.2 UIU/ML (ref 0.4–4)

## 2020-07-27 PROCEDURE — 84153 ASSAY OF PSA TOTAL: CPT

## 2020-07-27 PROCEDURE — 99999 PR PBB SHADOW E&M-EST. PATIENT-LVL IV: CPT | Mod: PBBFAC,,, | Performed by: FAMILY MEDICINE

## 2020-07-27 PROCEDURE — 84439 ASSAY OF FREE THYROXINE: CPT

## 2020-07-27 PROCEDURE — 84443 ASSAY THYROID STIM HORMONE: CPT

## 2020-07-27 PROCEDURE — 99215 OFFICE O/P EST HI 40 MIN: CPT | Mod: S$PBB,,, | Performed by: FAMILY MEDICINE

## 2020-07-27 PROCEDURE — 86140 C-REACTIVE PROTEIN: CPT

## 2020-07-27 PROCEDURE — 99214 OFFICE O/P EST MOD 30 MIN: CPT | Mod: PBBFAC,PO | Performed by: FAMILY MEDICINE

## 2020-07-27 PROCEDURE — 80061 LIPID PANEL: CPT

## 2020-07-27 PROCEDURE — 80053 COMPREHEN METABOLIC PANEL: CPT

## 2020-07-27 PROCEDURE — 82306 VITAMIN D 25 HYDROXY: CPT

## 2020-07-27 PROCEDURE — 99999 PR PBB SHADOW E&M-EST. PATIENT-LVL IV: ICD-10-PCS | Mod: PBBFAC,,, | Performed by: FAMILY MEDICINE

## 2020-07-27 PROCEDURE — 99215 PR OFFICE/OUTPT VISIT, EST, LEVL V, 40-54 MIN: ICD-10-PCS | Mod: S$PBB,,, | Performed by: FAMILY MEDICINE

## 2020-07-27 RX ORDER — OXYCODONE AND ACETAMINOPHEN 10; 325 MG/1; MG/1
1 TABLET ORAL 3 TIMES DAILY PRN
Qty: 90 TABLET | Refills: 0 | Status: SHIPPED | OUTPATIENT
Start: 2020-09-25 | End: 2020-09-25 | Stop reason: SDUPTHER

## 2020-07-27 RX ORDER — OXYCODONE AND ACETAMINOPHEN 10; 325 MG/1; MG/1
1 TABLET ORAL 3 TIMES DAILY PRN
Qty: 90 TABLET | Refills: 0 | Status: SHIPPED | OUTPATIENT
Start: 2020-07-27 | End: 2020-08-26

## 2020-07-27 RX ORDER — OXYCODONE AND ACETAMINOPHEN 10; 325 MG/1; MG/1
1 TABLET ORAL 3 TIMES DAILY PRN
Qty: 90 TABLET | Refills: 0 | Status: SHIPPED | OUTPATIENT
Start: 2020-08-26 | End: 2020-09-25

## 2020-07-27 RX ORDER — FAMOTIDINE 40 MG/1
40 TABLET, FILM COATED ORAL EVERY 12 HOURS
Qty: 30 TABLET | Refills: 5 | Status: SHIPPED | OUTPATIENT
Start: 2020-07-27 | End: 2021-02-12 | Stop reason: SDUPTHER

## 2020-07-27 NOTE — PROGRESS NOTES
Chief Complaint   Patient presents with    Ankylosing spondylitis of multiple sites in spine     follow up        Beau Ferraro is a 50 y.o. male who presents per the Chief Complaint.  Pt is known to me and was last seen by me on 2/21/2020.  All known chronic medical issues have been documented.    Hypertension  This is a new problem. The current episode started more than 1 month ago. The problem is unchanged. The problem is uncontrolled. Pertinent negatives include no anxiety, blurred vision, chest pain, headaches, malaise/fatigue, neck pain, orthopnea, palpitations, peripheral edema, PND, shortness of breath or sweats. There are no associated agents to hypertension. Risk factors for coronary artery disease include male gender and sedentary lifestyle. Past treatments include nothing. Compliance problems include exercise and psychosocial issues.  There is no history of angina, kidney disease, CAD/MI, CVA, heart failure, left ventricular hypertrophy, PVD or retinopathy. There is no history of chronic renal disease, coarctation of the aorta, hyperaldosteronism, hypercortisolism, hyperparathyroidism, a hypertension causing med, pheochromocytoma, renovascular disease, sleep apnea or a thyroid problem.   Back Pain  This is a chronic problem. The current episode started more than 1 year ago. The problem occurs daily. The problem is unchanged. The pain is present in the lumbar spine. The quality of the pain is described as aching. The pain does not radiate. The pain is at a severity of 7/10. The pain is moderate. The pain is the same all the time. The symptoms are aggravated by bending, position, standing and lying down. Stiffness is present all day. Associated symptoms include abdominal pain, leg pain, paresis, tingling and weakness. Pertinent negatives include no bladder incontinence, bowel incontinence, chest pain, dysuria, fever, headaches, numbness, paresthesias, pelvic pain, perianal numbness or weight loss. Risk  factors include lack of exercise and poor posture. He has tried analgesics and muscle relaxant for the symptoms. The treatment provided moderate relief.   Arthritis  Presents for follow-up visit. He complains of pain and stiffness. He reports no joint swelling or joint warmth. The symptoms have been stable. Affected locations include the neck, right hip, left hip, right knee, left knee, right ankle, left ankle, right foot, left foot, right wrist, left wrist, right elbow, left elbow, right shoulder and left shoulder. His pain is at a severity of 9/10. Associated symptoms include diarrhea (crohn's disease flare up), dry eyes, pain at night, pain while resting, rash (on face and behind ears) and uveitis. Pertinent negatives include no dry mouth, dysuria, fatigue, fever, Raynaud's syndrome or weight loss. Side effects of treatment include joint pain and limb pain.   Chronic Pain  Past Medical History Includes::  Rheumatoid arthritis and chronic pain syndrome  Chronicity:  Chronic  Onset:  More than 1 year ago  Frequency:  Constantly  Progression since onset:  Waxing and waning  Pain location:  Cervical spine, lumbar spine, thoracic spine, knee and hip  Medications Tried:  Hydrocodone/acetaminophen (Hycet, Lorcet, Lortab, Norco,Vicodin), Nerve Block, NSAIDs, Gabapentin (Neurontin) and oxycodone/acetaminophen (Percocet)  Previous Imaging:  MRI Scan and X-Ray  Current treatment:  Oxycodone/acetaminophen (Percocet) and tramadol  Improvement on Current Treatment:  Moderate  Goals of therapy:  Improve ADL's  Associated symptoms: abdominal pain, difficulty with ADL's, leg pain, paresis, tingling and weakness    Associated symptoms: no behavior changes, no upper extremity pain, no bladder incontinence, no bowel incontinence, no chest pain, no constipation, no dizziness, no shortness of breath, no headaches, no altered mental status, no numbness, no paresthesias, no urinary retention, no weight loss, no nausea and no diaphoresis     Drug Screen: No    Pain Contract: Yes    Prescription Monitoring Program  reviewed: Yes    Psychiatric Disorders:  None  History of Substance Abuse:  None      ROS  Review of Systems   Constitutional: Negative.  Negative for activity change, appetite change, chills, diaphoresis, fatigue, fever, malaise/fatigue, unexpected weight change and weight loss.   HENT: Negative.  Negative for congestion, ear pain, hearing loss, nosebleeds, postnasal drip, rhinorrhea, sinus pressure, sneezing, sore throat and trouble swallowing.    Eyes: Negative for blurred vision, pain and visual disturbance.   Respiratory: Negative for apnea, cough, choking, chest tightness and shortness of breath.    Cardiovascular: Negative for chest pain, palpitations, orthopnea, leg swelling and PND.   Gastrointestinal: Positive for abdominal pain and diarrhea (crohn's disease flare up). Negative for abdominal distention, anal bleeding, blood in stool, bowel incontinence, constipation, nausea, rectal pain and vomiting.   Genitourinary: Negative for bladder incontinence, difficulty urinating, dysuria, flank pain, frequency, pelvic pain, penile pain, penile swelling, scrotal swelling, testicular pain and urgency.   Musculoskeletal: Positive for arthralgias (right hand; most joints), arthritis, back pain, gait problem, myalgias, neck stiffness and stiffness. Negative for joint swelling and neck pain.   Skin: Positive for rash (on face and behind ears) and wound (under right arm pit). Negative for color change and pallor.   Allergic/Immunologic: Negative for environmental allergies, food allergies and immunocompromised state.   Neurological: Positive for tingling and weakness. Negative for dizziness, seizures, syncope, light-headedness, numbness, headaches and paresthesias.   Psychiatric/Behavioral: Positive for dysphoric mood. Negative for confusion, decreased concentration, hallucinations and sleep disturbance. The patient is nervous/anxious. The  "patient is not hyperactive.        Physical Exam  Vitals:    07/27/20 0815   BP: (!) 150/90   Pulse: 79   Resp: 20   Temp: 98.2 °F (36.8 °C)    Body mass index is 29.61 kg/m².  Weight: 80.7 kg (177 lb 14.6 oz)   Height: 5' 5" (165.1 cm)     Physical Exam  Vitals signs reviewed.   Constitutional:       General: He is not in acute distress.     Appearance: Normal appearance. He is well-developed. He is not ill-appearing, toxic-appearing or diaphoretic.   HENT:      Head: Normocephalic and atraumatic.      Right Ear: Hearing, tympanic membrane, ear canal and external ear normal. No decreased hearing noted. No tenderness. No foreign body. No mastoid tenderness. No hemotympanum. Tympanic membrane is not injected, scarred, perforated, erythematous, retracted or bulging.      Left Ear: Hearing, tympanic membrane, ear canal and external ear normal. No decreased hearing noted. No tenderness. No foreign body. No mastoid tenderness. No hemotympanum. Tympanic membrane is not injected, scarred, perforated, erythematous, retracted or bulging.      Nose: Nose normal. No nasal deformity, septal deviation or rhinorrhea.      Mouth/Throat:      Dentition: Normal dentition. Does not have dentures. No dental caries.      Pharynx: Uvula midline. No oropharyngeal exudate, posterior oropharyngeal erythema or uvula swelling.      Tonsils: No tonsillar abscesses.   Eyes:      General: Lids are normal. No scleral icterus.        Right eye: No foreign body, discharge or hordeolum.         Left eye: No foreign body, discharge or hordeolum.      Conjunctiva/sclera: Conjunctivae normal.      Right eye: No chemosis or exudate.     Left eye: No chemosis or exudate.     Pupils: Pupils are equal, round, and reactive to light.   Neck:      Musculoskeletal: Full passive range of motion without pain, normal range of motion and neck supple.      Thyroid: No thyroid mass or thyromegaly.   Cardiovascular:      Rate and Rhythm: Normal rate and regular " rhythm.      Heart sounds: Normal heart sounds, S1 normal and S2 normal. No murmur. No friction rub. No gallop.    Pulmonary:      Effort: Pulmonary effort is normal. No accessory muscle usage or respiratory distress.      Breath sounds: Normal breath sounds. No decreased breath sounds, wheezing, rhonchi or rales.   Abdominal:      General: Bowel sounds are normal. There is no distension.      Palpations: Abdomen is soft. Abdomen is not rigid. There is no mass.      Tenderness: There is no abdominal tenderness. There is no guarding or rebound.      Hernia: No hernia is present.   Musculoskeletal:      Right hip: He exhibits tenderness and bony tenderness. He exhibits normal range of motion, normal strength, no swelling, no crepitus, no deformity and no laceration.      Left hip: He exhibits tenderness and bony tenderness. He exhibits normal range of motion, normal strength, no swelling, no crepitus, no deformity and no laceration.      Cervical back: He exhibits decreased range of motion and pain. He exhibits no tenderness, no bony tenderness, no swelling, no edema, no deformity, no laceration, no spasm and normal pulse.      Lumbar back: He exhibits decreased range of motion, bony tenderness and pain. He exhibits no tenderness, no swelling, no edema, no deformity, no laceration, no spasm and normal pulse.      Right hand: He exhibits decreased range of motion, tenderness, bony tenderness, deformity and swelling. He exhibits normal two-point discrimination, normal capillary refill and no laceration. Normal sensation noted. Decreased sensation is not present in the ulnar distribution and is not present in the medial distribution. Decreased strength noted. He exhibits finger abduction and thumb/finger opposition. He exhibits no wrist extension trouble.      Right foot: Decreased range of motion. Normal capillary refill. Tenderness and swelling present. No bony tenderness, crepitus, deformity or laceration.      Left  foot: Decreased range of motion. Normal capillary refill. Tenderness and swelling present. No bony tenderness, crepitus, deformity or laceration.   Lymphadenopathy:      Head:      Right side of head: No submental, submandibular, preauricular or posterior auricular adenopathy.      Left side of head: No submental, submandibular, preauricular or posterior auricular adenopathy.      Cervical: No cervical adenopathy.   Skin:     General: Skin is warm and dry.      Coloration: Skin is not pale.      Findings: No erythema or rash.      Comments: Covered in multiple tattoos over face, torso, and arms.   Neurological:      Mental Status: He is alert and oriented to person, place, and time.      Cranial Nerves: No cranial nerve deficit.      Sensory: No sensory deficit.      Motor: No tremor, atrophy, abnormal muscle tone or seizure activity.      Coordination: Coordination normal.      Gait: Gait normal.   Psychiatric:         Attention and Perception: He is attentive.         Mood and Affect: Mood is not anxious or depressed. Affect is not labile, blunt, angry or inappropriate.         Speech: Speech normal.         Behavior: Behavior normal. Behavior is cooperative.         Thought Content: Thought content normal.         Judgment: Judgment normal.         Assessment & Plan    Discussion of plan of care including treatment options regarding health and wellness were reviewed and discussed with patient.  Any changes to medication or treatment plan, as well as any screening blood test, imaging, or referrals to specialist, are documented.  Follow up as indicated.     1. Ankylosing spondylitis of multiple sites in spine  Discussed rules regarding chronic pain management with opiate/opioid therapy.  Discussed use of alternative medications to manage pain with goal of reducing and possibly discontinue opioid therapy, and advised that in order to continue current therapy they would need to schedule appointment at least once  every 90 days, as well as consent to random urine drug screening.  The legitimate medical purpose of using a controlled substance for pain management is chronic pain related to autoimmune disease; not a surgical candidate.  Patient has been screened through the [facility] and is not receiving controlled substances from any other provider.  At this time, I have no suspicion of illegal activity and feel that with proper usage, there is low risk for overdose.     - oxyCODONE-acetaminophen (PERCOCET)  mg per tablet; Take 1 tablet by mouth 3 (three) times daily as needed.  Dispense: 90 tablet; Refill: 0  - oxyCODONE-acetaminophen (PERCOCET)  mg per tablet; Take 1 tablet by mouth 3 (three) times daily as needed.  Dispense: 90 tablet; Refill: 0  - oxyCODONE-acetaminophen (PERCOCET)  mg per tablet; Take 1 tablet by mouth 3 (three) times daily as needed.  Dispense: 90 tablet; Refill: 0    2. Essential hypertension  Patient was counseled and encouraged to maintain a low sodium diet, as well as increasing physical activity.  Recommend random BP checks at home on a regular basis.  Repeat BP at end of visit was not necessary. Will continue medication at this time, and follow up in 3-6 months, or sooner if blood pressure begins to increase.  Current elevated BP is likely due to pain exacerbation.  - CBC auto differential; Future    3. Degeneration of lumbar or lumbosacral intervertebral disc  The current medical regimen will be continued at this time as discussed.   - oxyCODONE-acetaminophen (PERCOCET)  mg per tablet; Take 1 tablet by mouth 3 (three) times daily as needed.  Dispense: 90 tablet; Refill: 0  - oxyCODONE-acetaminophen (PERCOCET)  mg per tablet; Take 1 tablet by mouth 3 (three) times daily as needed.  Dispense: 90 tablet; Refill: 0  - oxyCODONE-acetaminophen (PERCOCET)  mg per tablet; Take 1 tablet by mouth 3 (three) times daily as needed.  Dispense: 90 tablet; Refill: 0    4.  Hyperlipidemia, acquired  We discussed ways to manage cholesterol levels, including increasing whole grain foods and decreasing fried and fatty foods.  I also recommended OTC Omega 3 and Omega 6 supplements to improve overall cholesterol levels.  Will continue current therapy at this visit and will monitor lipids appropriately.   - Lipid Panel; Future    5. Gastroesophageal reflux disease without esophagitis  Patient was advised to continue medication and to decrease intake of caffeine and spicy foods and to elevate head while lying down.  Also advised to avoid lying down within 3 hours of last meal.  If symptoms continue with treatment, follow up for further evaluation.    - famotidine (PEPCID) 40 MG tablet; Take 1 tablet (40 mg total) by mouth every 12 (twelve) hours.  Dispense: 30 tablet; Refill: 5    6. Vitamin D insufficiency  An OTC Vitamin D3 regimen of 3915-5202 IU daily is recommended.    - Vitamin D; Future    7. Personal history of other endocrine, nutritional and metabolic disease  Screening test will be ordered and once results available patient will be notified of results and managed accordingly.   - TSH; Future  - T4, free; Future  - Hemoglobin A1C; Future    8. Special screening for malignant neoplasm of prostate  Screening test will be ordered and once results available patient will be notified of results and managed accordingly.   - PSA, Screening; Future      Follow up in about 3 months (around 10/27/2020), or if symptoms worsen or fail to improve, for Chronic Pain Management, Chronic Disease Management.      ACTIVE MEDICAL ISSUES:  Documented in Problem List    PAST MEDICAL HISTORY  Documented  .  PAST SURGICAL HISTORY:  Documented    SOCIAL HISTORY:  Documented    FAMILY HISTORY:  Documented    ALLERGIES AND MEDICATIONS: updated and reviewed.  Documented    Health Maintenance       Date Due Completion Date    TETANUS VACCINE 06/23/1988 ---    Lipid Panel 06/20/2019 6/20/2018    Shingles Vaccine  (1 of 2) 06/23/2020 ---    Colorectal Cancer Screening 06/23/2020 ---    Influenza Vaccine (1) 09/01/2020 11/19/2019

## 2020-09-25 DIAGNOSIS — M45.0 ANKYLOSING SPONDYLITIS OF MULTIPLE SITES IN SPINE: ICD-10-CM

## 2020-09-25 DIAGNOSIS — M51.37 DEGENERATION OF LUMBAR OR LUMBOSACRAL INTERVERTEBRAL DISC: ICD-10-CM

## 2020-09-25 RX ORDER — OXYCODONE AND ACETAMINOPHEN 10; 325 MG/1; MG/1
1 TABLET ORAL 3 TIMES DAILY PRN
Qty: 90 TABLET | Refills: 0 | Status: SHIPPED | OUTPATIENT
Start: 2020-09-25 | End: 2020-11-13 | Stop reason: SDUPTHER

## 2020-09-25 NOTE — TELEPHONE ENCOUNTER
----- Message from Janelle Ferraro sent at 9/25/2020 11:11 AM CDT -----  Type: Patient Call Back    Who called: Self     What is the request in detail: pt. Is calling in regards scheduling a sooner appt. With  To get medication refilled. Pt. Stated doctor informed him to do this early so he won't have issues with medication running out.     Can the clinic reply by [facility]? Call back     Would the patient rather a call back or a response via [facility]? Call back     Best call back number: [phone]

## 2020-10-01 DIAGNOSIS — I10 ESSENTIAL HYPERTENSION: ICD-10-CM

## 2020-10-01 RX ORDER — AMLODIPINE BESYLATE 5 MG/1
5 TABLET ORAL DAILY
Qty: 30 TABLET | Refills: 0 | Status: SHIPPED | OUTPATIENT
Start: 2020-10-01 | End: 2020-11-13 | Stop reason: SDUPTHER

## 2020-10-01 NOTE — TELEPHONE ENCOUNTER
Last Office Visit Info:   The patient's last visit with Azikiwe K Lombard, MD was on 7/27/2020.    The patient's last visit in current department was on 7/27/2020.        Last CBC Results:   Lab Results   Component Value Date    WBC 5.70 09/19/2019    HGB 11.2 (L) 09/19/2019    HCT 36.8 (L) 09/19/2019     09/19/2019       Last CMP Results  Lab Results   Component Value Date     07/27/2020    K 3.7 07/27/2020     07/27/2020    CO2 22 (L) 07/27/2020    BUN 17 07/27/2020    CREATININE 1.2 07/27/2020    CALCIUM 8.7 07/27/2020    ALBUMIN 3.8 07/27/2020    AST 23 07/27/2020    ALT 22 07/27/2020       Last Lipids  Lab Results   Component Value Date    CHOL 225 (H) 07/27/2020    CHOL 225 (H) 07/27/2020    TRIG 78 07/27/2020    TRIG 79 07/27/2020    HDL 54 07/27/2020    HDL 54 07/27/2020    LDLCALC 155.4 07/27/2020    LDLCALC 155.2 07/27/2020       Last A1C  No results found for: HGBA1C    Last TSH  Lab Results   Component Value Date    TSH 1.196 07/27/2020         Current Med Refills  Medication List with Changes/Refills   Current Medications    ADALIMUMAB (HUMIRA PEN) 40 MG/0.8 ML PNKT    Inject 1 pen (40 mg total) into the skin every 7 days.       Start Date: 7/10/2020 End Date: 10/2/2020    AMLODIPINE (NORVASC) 5 MG TABLET    Take 1 tablet (5 mg total) by mouth once daily.       Start Date: 7/10/2020 End Date: --    BACK BRACE MISC    1 each by Misc.(Non-Drug; Combo Route) route Daily.       Start Date: 5/12/2016 End Date: --    CHOLECALCIFEROL, VITAMIN D3, (DECARA) 1,250 MCG (50,000 UNIT) CAPSULE    Take 1 capsule (50,000 Units total) by mouth once daily.       Start Date: 7/10/2020 End Date: --    CLONAZEPAM (KLONOPIN) 2 MG TAB           Start Date: 5/4/2020  End Date: --    CLOTRIMAZOLE (LOTRIMIN) 1 % SOLN    as needed.        Start Date: 6/1/2019  End Date: --    FAMOTIDINE (PEPCID) 40 MG TABLET    Take 1 tablet (40 mg total) by mouth every 12 (twelve) hours.       Start Date: 7/27/2020 End Date:  --    OXYCODONE-ACETAMINOPHEN (PERCOCET)  MG PER TABLET    Take 1 tablet by mouth 3 (three) times daily as needed.       Start Date: 9/25/2020 End Date: 10/25/2020    PRAVASTATIN (PRAVACHOL) 20 MG TABLET    TAKE ONE TABLET BY MOUTH nightly       Start Date: 8/10/2020 End Date: --    TERBINAFINE HCL (LAMISIL AT) 1 % CREAM    Apply topically 2 (two) times daily.       Start Date: 7/10/2020 End Date: --    TRIAMCINOLONE (KENALOG) 0.5 % OINTMENT    Apply topically 2 (two) times daily.       Start Date: 7/10/2020 End Date: --       Order(s) placed per written order guidelines:     Please advise.

## 2020-11-13 ENCOUNTER — OFFICE VISIT (OUTPATIENT)
Dept: FAMILY MEDICINE | Facility: CLINIC | Age: 50
End: 2020-11-13
Payer: MEDICARE

## 2020-11-13 VITALS
SYSTOLIC BLOOD PRESSURE: 112 MMHG | TEMPERATURE: 99 F | WEIGHT: 177.94 LBS | DIASTOLIC BLOOD PRESSURE: 80 MMHG | HEIGHT: 65 IN | RESPIRATION RATE: 16 BRPM | OXYGEN SATURATION: 99 % | HEART RATE: 73 BPM | BODY MASS INDEX: 29.65 KG/M2

## 2020-11-13 DIAGNOSIS — M51.37 DEGENERATION OF LUMBAR OR LUMBOSACRAL INTERVERTEBRAL DISC: ICD-10-CM

## 2020-11-13 DIAGNOSIS — M45.0 ANKYLOSING SPONDYLITIS OF MULTIPLE SITES IN SPINE: Primary | ICD-10-CM

## 2020-11-13 DIAGNOSIS — Z23 FLU VACCINE NEED: ICD-10-CM

## 2020-11-13 DIAGNOSIS — M48.02 SPINAL STENOSIS OF CERVICAL REGION: ICD-10-CM

## 2020-11-13 DIAGNOSIS — L02.32 BOIL OF BUTTOCK: ICD-10-CM

## 2020-11-13 DIAGNOSIS — F11.20 OPIOID DEPENDENCE, CONTINUOUS: ICD-10-CM

## 2020-11-13 DIAGNOSIS — Z12.11 SCREEN FOR COLON CANCER: ICD-10-CM

## 2020-11-13 DIAGNOSIS — I10 ESSENTIAL HYPERTENSION: ICD-10-CM

## 2020-11-13 PROCEDURE — 99215 PR OFFICE/OUTPT VISIT, EST, LEVL V, 40-54 MIN: ICD-10-PCS | Mod: S$PBB,,, | Performed by: FAMILY MEDICINE

## 2020-11-13 PROCEDURE — 99215 OFFICE O/P EST HI 40 MIN: CPT | Mod: PBBFAC,PO,25 | Performed by: FAMILY MEDICINE

## 2020-11-13 PROCEDURE — 99999 PR PBB SHADOW E&M-EST. PATIENT-LVL V: CPT | Mod: PBBFAC,,, | Performed by: FAMILY MEDICINE

## 2020-11-13 PROCEDURE — 90686 IIV4 VACC NO PRSV 0.5 ML IM: CPT | Mod: PBBFAC,PO

## 2020-11-13 PROCEDURE — 99215 OFFICE O/P EST HI 40 MIN: CPT | Mod: S$PBB,,, | Performed by: FAMILY MEDICINE

## 2020-11-13 PROCEDURE — 99999 PR PBB SHADOW E&M-EST. PATIENT-LVL V: ICD-10-PCS | Mod: PBBFAC,,, | Performed by: FAMILY MEDICINE

## 2020-11-13 RX ORDER — OXYCODONE AND ACETAMINOPHEN 10; 325 MG/1; MG/1
1 TABLET ORAL 3 TIMES DAILY PRN
Qty: 90 TABLET | Refills: 0 | Status: SHIPPED | OUTPATIENT
Start: 2021-01-22 | End: 2021-02-12 | Stop reason: SDUPTHER

## 2020-11-13 RX ORDER — OXYCODONE AND ACETAMINOPHEN 10; 325 MG/1; MG/1
1 TABLET ORAL 3 TIMES DAILY PRN
Qty: 90 TABLET | Refills: 0 | Status: SHIPPED | OUTPATIENT
Start: 2020-11-23 | End: 2020-12-23

## 2020-11-13 RX ORDER — SULFAMETHOXAZOLE AND TRIMETHOPRIM 800; 160 MG/1; MG/1
1 TABLET ORAL 2 TIMES DAILY
Qty: 14 TABLET | Refills: 0 | Status: SHIPPED | OUTPATIENT
Start: 2020-11-13 | End: 2021-02-12

## 2020-11-13 RX ORDER — OXYCODONE AND ACETAMINOPHEN 10; 325 MG/1; MG/1
1 TABLET ORAL 3 TIMES DAILY PRN
Qty: 90 TABLET | Refills: 0 | Status: SHIPPED | OUTPATIENT
Start: 2020-12-23 | End: 2021-01-22

## 2020-11-13 RX ORDER — CLOTRIMAZOLE 1 G/ML
SOLUTION TOPICAL
Refills: 11 | Status: CANCELLED | OUTPATIENT
Start: 2020-11-13

## 2020-11-13 RX ORDER — AMLODIPINE BESYLATE 5 MG/1
5 TABLET ORAL DAILY
Qty: 30 TABLET | Refills: 5 | Status: SHIPPED | OUTPATIENT
Start: 2020-11-13 | End: 2021-02-12 | Stop reason: SDUPTHER

## 2020-11-13 NOTE — PROGRESS NOTES
Health Maintenance Due   Topic     Sign Pain Contract  Consult with PCP     Shingles Vaccine (1 of 2) hx chickenpox ; inform pt can get vaccine at pharmacy.    Colorectal Cancer Screening  Pending orders cscope     Influenza Vaccine (1) Ok to get today

## 2020-11-13 NOTE — PROGRESS NOTES
Chief Complaint   Patient presents with    Ankylosing spondylitis of multiple sites in spine     3 mo follow up        Beau Ferraro is a 50 y.o. male who presents per the Chief Complaint.  Pt is known to me and was last seen by me on 7/27/2020.  All known chronic medical issues have been documented.    Hypertension  This is a new problem. The current episode started more than 1 month ago. The problem is unchanged. The problem is uncontrolled. Pertinent negatives include no anxiety, blurred vision, chest pain, headaches, malaise/fatigue, neck pain, orthopnea, palpitations, peripheral edema, PND, shortness of breath or sweats. There are no associated agents to hypertension. Risk factors for coronary artery disease include male gender and sedentary lifestyle. Past treatments include nothing. Compliance problems include exercise and psychosocial issues.  There is no history of angina, kidney disease, CAD/MI, CVA, heart failure, left ventricular hypertrophy, PVD or retinopathy. There is no history of chronic renal disease, coarctation of the aorta, hyperaldosteronism, hypercortisolism, hyperparathyroidism, a hypertension causing med, pheochromocytoma, renovascular disease, sleep apnea or a thyroid problem.   Back Pain  This is a chronic problem. The current episode started more than 1 year ago. The problem occurs daily. The problem is unchanged. The pain is present in the lumbar spine. The quality of the pain is described as aching. The pain does not radiate. The pain is at a severity of 7/10. The pain is moderate. The pain is the same all the time. The symptoms are aggravated by bending, position, standing and lying down. Stiffness is present all day. Associated symptoms include abdominal pain, leg pain, paresis, tingling and weakness. Pertinent negatives include no bladder incontinence, bowel incontinence, chest pain, dysuria, fever, headaches, numbness, paresthesias, pelvic pain, perianal numbness or weight loss.  Risk factors include lack of exercise and poor posture. He has tried analgesics and muscle relaxant for the symptoms. The treatment provided moderate relief.   Arthritis  Presents for follow-up visit. He complains of pain and stiffness. He reports no joint swelling or joint warmth. The symptoms have been stable. Affected locations include the neck, right hip, left hip, right knee, left knee, right ankle, left ankle, right foot, left foot, right wrist, left wrist, right elbow, left elbow, right shoulder and left shoulder. His pain is at a severity of 9/10. Associated symptoms include diarrhea (crohn's disease flare up), dry eyes, pain at night, pain while resting, rash (on face and behind ears) and uveitis. Pertinent negatives include no dry mouth, dysuria, fatigue, fever, Raynaud's syndrome or weight loss. Side effects of treatment include joint pain and limb pain.   Chronic Pain  Past Medical History Includes::  Rheumatoid arthritis and chronic pain syndrome  Chronicity:  Chronic  Onset:  More than 1 year ago  Frequency:  Constantly  Progression since onset:  Waxing and waning  Pain location:  Cervical spine, lumbar spine, thoracic spine, knee and hip  Medications Tried:  Hydrocodone/acetaminophen (Hycet, Lorcet, Lortab, Norco,Vicodin), Nerve Block, NSAIDs, Gabapentin (Neurontin) and oxycodone/acetaminophen (Percocet)  Previous Imaging:  MRI Scan and X-Ray  Current treatment:  Oxycodone/acetaminophen (Percocet) and tramadol  Improvement on Current Treatment:  Moderate  Goals of therapy:  Improve ADL's  Associated symptoms: abdominal pain, difficulty with ADL's, leg pain, paresis, tingling and weakness    Associated symptoms: no behavior changes, no upper extremity pain, no bladder incontinence, no bowel incontinence, no chest pain, no constipation, no dizziness, no shortness of breath, no headaches, no altered mental status, no numbness, no paresthesias, no urinary retention, no weight loss, no nausea and no  diaphoresis    Drug Screen: No    Pain Contract: Yes    Prescription Monitoring Program  reviewed: Yes    Psychiatric Disorders:  None  History of Substance Abuse:  None      ROS  Review of Systems   Constitutional: Negative.  Negative for activity change, appetite change, chills, diaphoresis, fatigue, fever, malaise/fatigue, unexpected weight change and weight loss.   HENT: Negative.  Negative for congestion, ear pain, hearing loss, nosebleeds, postnasal drip, rhinorrhea, sinus pressure, sneezing, sore throat and trouble swallowing.    Eyes: Negative for blurred vision, pain and visual disturbance.   Respiratory: Negative for apnea, cough, choking, chest tightness and shortness of breath.    Cardiovascular: Negative for chest pain, palpitations, orthopnea, leg swelling and PND.   Gastrointestinal: Positive for abdominal pain and diarrhea (crohn's disease flare up). Negative for abdominal distention, anal bleeding, blood in stool, bowel incontinence, constipation, nausea, rectal pain and vomiting.   Genitourinary: Negative for bladder incontinence, difficulty urinating, dysuria, flank pain, frequency, pelvic pain, penile pain, penile swelling, scrotal swelling, testicular pain and urgency.   Musculoskeletal: Positive for arthralgias (right hand; most joints), arthritis, back pain, gait problem, myalgias, neck stiffness and stiffness. Negative for joint swelling and neck pain.   Skin: Positive for rash (on face and behind ears) and wound (under right arm pit). Negative for color change and pallor.   Allergic/Immunologic: Negative for environmental allergies, food allergies and immunocompromised state.   Neurological: Positive for tingling and weakness. Negative for dizziness, seizures, syncope, light-headedness, numbness, headaches and paresthesias.   Psychiatric/Behavioral: Positive for dysphoric mood. Negative for confusion, decreased concentration, hallucinations and sleep disturbance. The patient is  "nervous/anxious. The patient is not hyperactive.      Physical Exam  Vitals:    11/13/20 0921   BP: 112/80   Pulse: 73   Resp: 16   Temp: 98.6 °F (37 °C)    Body mass index is 29.61 kg/m².  Weight: 80.7 kg (177 lb 14.6 oz)   Height: 5' 5" (165.1 cm)     Physical Exam  Vitals signs reviewed.   Constitutional:       General: He is not in acute distress.     Appearance: Normal appearance. He is well-developed. He is not ill-appearing, toxic-appearing or diaphoretic.   HENT:      Head: Normocephalic and atraumatic.      Right Ear: Hearing, tympanic membrane, ear canal and external ear normal. No decreased hearing noted. No tenderness. No foreign body. No mastoid tenderness. No hemotympanum. Tympanic membrane is not injected, scarred, perforated, erythematous, retracted or bulging.      Left Ear: Hearing, tympanic membrane, ear canal and external ear normal. No decreased hearing noted. No tenderness. No foreign body. No mastoid tenderness. No hemotympanum. Tympanic membrane is not injected, scarred, perforated, erythematous, retracted or bulging.      Nose: Nose normal. No nasal deformity, septal deviation or rhinorrhea.      Mouth/Throat:      Dentition: Normal dentition. Does not have dentures. No dental caries.      Pharynx: Uvula midline. No oropharyngeal exudate, posterior oropharyngeal erythema or uvula swelling.      Tonsils: No tonsillar abscesses.   Eyes:      General: Lids are normal. No scleral icterus.        Right eye: No foreign body, discharge or hordeolum.         Left eye: No foreign body, discharge or hordeolum.      Conjunctiva/sclera: Conjunctivae normal.      Right eye: No chemosis or exudate.     Left eye: No chemosis or exudate.     Pupils: Pupils are equal, round, and reactive to light.   Neck:      Musculoskeletal: Full passive range of motion without pain, normal range of motion and neck supple.      Thyroid: No thyroid mass or thyromegaly.   Cardiovascular:      Rate and Rhythm: Normal rate and " regular rhythm.      Heart sounds: Normal heart sounds, S1 normal and S2 normal. No murmur. No friction rub. No gallop.    Pulmonary:      Effort: Pulmonary effort is normal. No accessory muscle usage or respiratory distress.      Breath sounds: Normal breath sounds. No decreased breath sounds, wheezing, rhonchi or rales.   Abdominal:      General: Bowel sounds are normal. There is no distension.      Palpations: Abdomen is soft. Abdomen is not rigid. There is no mass.      Tenderness: There is no abdominal tenderness. There is no guarding or rebound.      Hernia: No hernia is present.   Musculoskeletal:      Right hip: He exhibits tenderness and bony tenderness. He exhibits normal range of motion, normal strength, no swelling, no crepitus, no deformity and no laceration.      Left hip: He exhibits tenderness and bony tenderness. He exhibits normal range of motion, normal strength, no swelling, no crepitus, no deformity and no laceration.      Cervical back: He exhibits decreased range of motion and pain. He exhibits no tenderness, no bony tenderness, no swelling, no edema, no deformity, no laceration, no spasm and normal pulse.      Lumbar back: He exhibits decreased range of motion, bony tenderness and pain. He exhibits no tenderness, no swelling, no edema, no deformity, no laceration, no spasm and normal pulse.      Right hand: He exhibits decreased range of motion, tenderness, bony tenderness, deformity and swelling. He exhibits normal two-point discrimination, normal capillary refill and no laceration. Normal sensation noted. Decreased sensation is not present in the ulnar distribution and is not present in the medial distribution. Decreased strength noted. He exhibits finger abduction and thumb/finger opposition. He exhibits no wrist extension trouble.      Right foot: Decreased range of motion. Normal capillary refill. Tenderness and swelling present. No bony tenderness, crepitus, deformity or laceration.       Left foot: Decreased range of motion. Normal capillary refill. Tenderness and swelling present. No bony tenderness, crepitus, deformity or laceration.   Lymphadenopathy:      Head:      Right side of head: No submental, submandibular, preauricular or posterior auricular adenopathy.      Left side of head: No submental, submandibular, preauricular or posterior auricular adenopathy.      Cervical: No cervical adenopathy.   Skin:     General: Skin is warm and dry.      Coloration: Skin is not pale.      Findings: No erythema or rash.      Comments: Covered in multiple tattoos over face, torso, and arms.   Neurological:      Mental Status: He is alert and oriented to person, place, and time.      Cranial Nerves: No cranial nerve deficit.      Sensory: No sensory deficit.      Motor: No tremor, atrophy, abnormal muscle tone or seizure activity.      Coordination: Coordination normal.      Gait: Gait normal.   Psychiatric:         Attention and Perception: He is attentive.         Mood and Affect: Mood is not anxious or depressed. Affect is not labile, blunt, angry or inappropriate.         Speech: Speech normal.         Behavior: Behavior normal. Behavior is cooperative.         Thought Content: Thought content normal.         Judgment: Judgment normal.       Assessment & Plan    Discussion of plan of care including treatment options regarding health and wellness were reviewed and discussed with patient.  Any changes to medication or treatment plan, as well as any screening blood test, imaging, or referrals to specialist, are documented.  Follow up as indicated.     1. Ankylosing spondylitis of multiple sites in spine  Discussed rules regarding chronic pain management with opiate/opioid therapy.  Discussed use of alternative medications to manage pain with goal of reducing and possibly discontinue opioid therapy, and advised that in order to continue current therapy they would need to schedule appointment at least once  every 90 days, as well as consent to random urine drug screening.  The legitimate medical purpose of using a controlled substance for pain management is chronic pain not amenable to surgery.  Patient has been screened through the [facility] and is not receiving controlled substances from any other provider.  At this time, I have no suspicion of illegal activity and feel that with proper usage, there is low risk for overdose.     - oxyCODONE-acetaminophen (PERCOCET)  mg per tablet; Take 1 tablet by mouth 3 (three) times daily as needed.  Dispense: 90 tablet; Refill: 0  - oxyCODONE-acetaminophen (PERCOCET)  mg per tablet; Take 1 tablet by mouth 3 (three) times daily as needed.  Dispense: 90 tablet; Refill: 0  - oxyCODONE-acetaminophen (PERCOCET)  mg per tablet; Take 1 tablet by mouth 3 (three) times daily as needed.  Dispense: 90 tablet; Refill: 0    2. Essential hypertension  Patient was counseled and encouraged to maintain a low sodium diet, as well as increasing physical activity.  Recommend random BP checks at home on a regular basis.  Repeat BP at end of visit was not necessary. Will continue medication at this time, and follow up in 3-6 months, or sooner if blood pressure begins to increase.     - amLODIPine (NORVASC) 5 MG tablet; Take 1 tablet (5 mg total) by mouth once daily.  Dispense: 30 tablet; Refill: 5    3. Degeneration of lumbar or lumbosacral intervertebral disc  The current medical regimen will be continued at this time as discussed.  Medication prescribed for use only as symptoms require.   - oxyCODONE-acetaminophen (PERCOCET)  mg per tablet; Take 1 tablet by mouth 3 (three) times daily as needed.  Dispense: 90 tablet; Refill: 0  - oxyCODONE-acetaminophen (PERCOCET)  mg per tablet; Take 1 tablet by mouth 3 (three) times daily as needed.  Dispense: 90 tablet; Refill: 0  - oxyCODONE-acetaminophen (PERCOCET)  mg per tablet; Take 1 tablet by mouth 3 (three) times  daily as needed.  Dispense: 90 tablet; Refill: 0    4. Spinal stenosis of cervical region  Stable; no therapeutic changes at this time.     5. Boil of buttock  Patient was advised that area appears infected, and that antibiotics will be prescribed.  Advised not to squeeze in order to force discharge, but rather apply warm to hot compresses and/or a baking soda based paste or OTC therapy to area to assist in drainage.  Pain can be managed with over-the-counter pain medication, and the area should remain as clean and dry as possible.   - sulfamethoxazole-trimethoprim 800-160mg (BACTRIM DS) 800-160 mg Tab; Take 1 tablet by mouth 2 (two) times daily.  Dispense: 14 tablet; Refill: 0    6. Opioid dependence, continuous  Patient has a therapist that he sees for mood changes related to chronic pain and chronic medical issues.      7. Flu vaccine need  Patient requested Flu vaccine, and was consented and vaccine given in office without complication.   - Influenza - Quadrivalent *Preferred* (6 months+) (PF)    8. Screen for colon cancer  Patient is due for colonoscopy.  Order placed in [employer] and patient will be contacted to schedule appointment.  I will notify patient of results once available.    - Case request GI: COLONOSCOPY       Follow up in about 3 months (around 2/13/2021) for Chronic Disease Management.      ACTIVE MEDICAL ISSUES:  Documented in Problem List    PAST MEDICAL HISTORY  Documented  .  PAST SURGICAL HISTORY:  Documented    SOCIAL HISTORY:  Documented    FAMILY HISTORY:  Documented    ALLERGIES AND MEDICATIONS: updated and reviewed.  Documented    Health Maintenance       Date Due Completion Date    TETANUS VACCINE 06/23/1988 ---    Sign Pain Contract 06/23/1988 ---    Shingles Vaccine (1 of 2) 06/23/2020 ---    Colorectal Cancer Screening 06/23/2020 ---    Lipid Panel 07/27/2021 7/27/2020

## 2020-11-20 DIAGNOSIS — L30.9 CHRONIC ECZEMA: ICD-10-CM

## 2020-11-20 RX ORDER — TRIAMCINOLONE ACETONIDE 5 MG/G
OINTMENT TOPICAL 2 TIMES DAILY
Qty: 30 G | Refills: 5 | Status: SHIPPED | OUTPATIENT
Start: 2020-11-20 | End: 2021-04-16

## 2020-11-20 NOTE — TELEPHONE ENCOUNTER
Last Office Visit Info:   The patient's last visit with Azikiwe K Lombard, MD was on 11/13/2020.    The patient's last visit in current department was on 11/13/2020.        Last CBC Results:   Lab Results   Component Value Date    WBC 5.70 09/19/2019    HGB 11.2 (L) 09/19/2019    HCT 36.8 (L) 09/19/2019     09/19/2019       Last CMP Results  Lab Results   Component Value Date     07/27/2020    K 3.7 07/27/2020     07/27/2020    CO2 22 (L) 07/27/2020    BUN 17 07/27/2020    CREATININE 1.2 07/27/2020    CALCIUM 8.7 07/27/2020    ALBUMIN 3.8 07/27/2020    AST 23 07/27/2020    ALT 22 07/27/2020       Last Lipids  Lab Results   Component Value Date    CHOL 225 (H) 07/27/2020    CHOL 225 (H) 07/27/2020    TRIG 78 07/27/2020    TRIG 79 07/27/2020    HDL 54 07/27/2020    HDL 54 07/27/2020    LDLCALC 155.4 07/27/2020    LDLCALC 155.2 07/27/2020       Last A1C  No results found for: HGBA1C    Last TSH  Lab Results   Component Value Date    TSH 1.196 07/27/2020         Current Med Refills  Medication List with Changes/Refills   Current Medications    ADALIMUMAB (HUMIRA PEN) 40 MG/0.8 ML PNKT    Inject 1 pen (40 mg total) into the skin every 7 days.       Start Date: 7/10/2020 End Date: 11/13/2020    AMLODIPINE (NORVASC) 5 MG TABLET    Take 1 tablet (5 mg total) by mouth once daily.       Start Date: 11/13/2020End Date: --    BACK BRACE MISC    1 each by Misc.(Non-Drug; Combo Route) route Daily.       Start Date: 5/12/2016 End Date: --    CHOLECALCIFEROL, VITAMIN D3, (DECARA) 1,250 MCG (50,000 UNIT) CAPSULE    Take 1 capsule (50,000 Units total) by mouth once daily.       Start Date: 7/10/2020 End Date: --    CLONAZEPAM (KLONOPIN) 2 MG TAB           Start Date: 5/4/2020  End Date: --    CLOTRIMAZOLE (LOTRIMIN) 1 % SOLN    as needed.        Start Date: 6/1/2019  End Date: --    FAMOTIDINE (PEPCID) 40 MG TABLET    Take 1 tablet (40 mg total) by mouth every 12 (twelve) hours.       Start Date: 7/27/2020 End  Date: --    OXYCODONE-ACETAMINOPHEN (PERCOCET)  MG PER TABLET    Take 1 tablet by mouth 3 (three) times daily as needed.       Start Date: 11/23/2020End Date: 12/23/2020    OXYCODONE-ACETAMINOPHEN (PERCOCET)  MG PER TABLET    Take 1 tablet by mouth 3 (three) times daily as needed.       Start Date: 12/23/2020End Date: 1/22/2021    OXYCODONE-ACETAMINOPHEN (PERCOCET)  MG PER TABLET    Take 1 tablet by mouth 3 (three) times daily as needed.       Start Date: 1/22/2021 End Date: 2/21/2021    PRAVASTATIN (PRAVACHOL) 20 MG TABLET    TAKE ONE TABLET BY MOUTH nightly       Start Date: 8/10/2020 End Date: --    SULFAMETHOXAZOLE-TRIMETHOPRIM 800-160MG (BACTRIM DS) 800-160 MG TAB    Take 1 tablet by mouth 2 (two) times daily.       Start Date: 11/13/2020End Date: --    TERBINAFINE HCL (LAMISIL AT) 1 % CREAM    Apply topically 2 (two) times daily.       Start Date: 7/10/2020 End Date: --    TRIAMCINOLONE (KENALOG) 0.5 % OINTMENT    Apply topically 2 (two) times daily.       Start Date: 7/10/2020 End Date: --       Order(s) placed per written order guidelines:     Please advise.

## 2020-12-03 DIAGNOSIS — M06.4 INFLAMMATORY POLYARTHRITIS: ICD-10-CM

## 2020-12-03 DIAGNOSIS — M45.0 ANKYLOSING SPONDYLITIS OF MULTIPLE SITES IN SPINE: ICD-10-CM

## 2020-12-03 DIAGNOSIS — M47.899 HLA-B27 SPONDYLOARTHROPATHY: ICD-10-CM

## 2020-12-03 DIAGNOSIS — K50.10 CROHN'S DISEASE OF LARGE INTESTINE WITHOUT COMPLICATION: ICD-10-CM

## 2020-12-04 RX ORDER — ADALIMUMAB 40MG/0.8ML
40 KIT SUBCUTANEOUS
Qty: 12 PEN | Refills: 2 | Status: SHIPPED | OUTPATIENT
Start: 2020-12-04 | End: 2021-06-04 | Stop reason: SDUPTHER
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
## 2021-01-29 ENCOUNTER — PATIENT OUTREACH (OUTPATIENT)
Dept: ADMINISTRATIVE | Facility: HOSPITAL | Age: 51
End: 2021-01-29

## 2021-02-12 ENCOUNTER — OFFICE VISIT (OUTPATIENT)
Dept: FAMILY MEDICINE | Facility: CLINIC | Age: 51
End: 2021-02-12
Payer: MEDICARE

## 2021-02-12 VITALS
SYSTOLIC BLOOD PRESSURE: 116 MMHG | DIASTOLIC BLOOD PRESSURE: 82 MMHG | WEIGHT: 179.44 LBS | OXYGEN SATURATION: 96 % | RESPIRATION RATE: 16 BRPM | BODY MASS INDEX: 29.9 KG/M2 | HEIGHT: 65 IN | TEMPERATURE: 99 F | HEART RATE: 78 BPM

## 2021-02-12 DIAGNOSIS — K50.10 CROHN'S DISEASE OF LARGE INTESTINE WITHOUT COMPLICATION: ICD-10-CM

## 2021-02-12 DIAGNOSIS — M45.0 ANKYLOSING SPONDYLITIS OF MULTIPLE SITES IN SPINE: Primary | ICD-10-CM

## 2021-02-12 DIAGNOSIS — F11.20 OPIOID DEPENDENCE, CONTINUOUS: ICD-10-CM

## 2021-02-12 DIAGNOSIS — I10 ESSENTIAL HYPERTENSION: ICD-10-CM

## 2021-02-12 DIAGNOSIS — K21.9 GASTROESOPHAGEAL REFLUX DISEASE WITHOUT ESOPHAGITIS: ICD-10-CM

## 2021-02-12 DIAGNOSIS — M51.37 DEGENERATION OF LUMBAR OR LUMBOSACRAL INTERVERTEBRAL DISC: ICD-10-CM

## 2021-02-12 PROCEDURE — 99999 PR PBB SHADOW E&M-EST. PATIENT-LVL IV: ICD-10-PCS | Mod: PBBFAC,,, | Performed by: FAMILY MEDICINE

## 2021-02-12 PROCEDURE — 99215 OFFICE O/P EST HI 40 MIN: CPT | Mod: S$PBB,,, | Performed by: FAMILY MEDICINE

## 2021-02-12 PROCEDURE — 99999 PR PBB SHADOW E&M-EST. PATIENT-LVL IV: CPT | Mod: PBBFAC,,, | Performed by: FAMILY MEDICINE

## 2021-02-12 PROCEDURE — 99215 PR OFFICE/OUTPT VISIT, EST, LEVL V, 40-54 MIN: ICD-10-PCS | Mod: S$PBB,,, | Performed by: FAMILY MEDICINE

## 2021-02-12 PROCEDURE — 99214 OFFICE O/P EST MOD 30 MIN: CPT | Mod: PBBFAC,PO | Performed by: FAMILY MEDICINE

## 2021-02-12 RX ORDER — AMLODIPINE BESYLATE 5 MG/1
5 TABLET ORAL DAILY
Qty: 30 TABLET | Refills: 5 | Status: SHIPPED | OUTPATIENT
Start: 2021-02-12 | End: 2021-04-16 | Stop reason: SDUPTHER

## 2021-02-12 RX ORDER — OXYCODONE AND ACETAMINOPHEN 10; 325 MG/1; MG/1
1 TABLET ORAL 3 TIMES DAILY PRN
Qty: 90 TABLET | Refills: 0 | Status: SHIPPED | OUTPATIENT
Start: 2021-03-19 | End: 2021-04-18

## 2021-02-12 RX ORDER — METHYLPREDNISOLONE 4 MG/1
TABLET ORAL
Qty: 1 PACKAGE | Refills: 0 | Status: SHIPPED | OUTPATIENT
Start: 2021-02-12 | End: 2021-04-16

## 2021-02-12 RX ORDER — FAMOTIDINE 40 MG/1
40 TABLET, FILM COATED ORAL EVERY 12 HOURS
Qty: 30 TABLET | Refills: 5 | Status: SHIPPED | OUTPATIENT
Start: 2021-02-12 | End: 2021-06-29 | Stop reason: SDUPTHER

## 2021-02-12 RX ORDER — OXYCODONE AND ACETAMINOPHEN 10; 325 MG/1; MG/1
1 TABLET ORAL 3 TIMES DAILY PRN
Qty: 90 TABLET | Refills: 0 | Status: SHIPPED | OUTPATIENT
Start: 2021-04-16 | End: 2021-04-16 | Stop reason: SDUPTHER

## 2021-02-12 RX ORDER — OXYCODONE AND ACETAMINOPHEN 10; 325 MG/1; MG/1
1 TABLET ORAL 3 TIMES DAILY PRN
Qty: 90 TABLET | Refills: 0 | Status: SHIPPED | OUTPATIENT
Start: 2021-02-19 | End: 2021-03-21

## 2021-03-16 ENCOUNTER — TELEPHONE (OUTPATIENT)
Dept: FAMILY MEDICINE | Facility: CLINIC | Age: 51
End: 2021-03-16

## 2021-04-14 ENCOUNTER — TELEPHONE (OUTPATIENT)
Dept: ENDOSCOPY | Facility: HOSPITAL | Age: 51
End: 2021-04-14

## 2021-04-16 ENCOUNTER — OFFICE VISIT (OUTPATIENT)
Dept: FAMILY MEDICINE | Facility: CLINIC | Age: 51
End: 2021-04-16
Payer: MEDICARE

## 2021-04-16 VITALS
BODY MASS INDEX: 28.91 KG/M2 | SYSTOLIC BLOOD PRESSURE: 118 MMHG | TEMPERATURE: 98 F | WEIGHT: 173.5 LBS | RESPIRATION RATE: 16 BRPM | DIASTOLIC BLOOD PRESSURE: 80 MMHG | OXYGEN SATURATION: 99 % | HEART RATE: 65 BPM | HEIGHT: 65 IN

## 2021-04-16 DIAGNOSIS — Z12.11 SCREEN FOR COLON CANCER: ICD-10-CM

## 2021-04-16 DIAGNOSIS — M45.0 ANKYLOSING SPONDYLITIS OF MULTIPLE SITES IN SPINE: Primary | ICD-10-CM

## 2021-04-16 DIAGNOSIS — I10 ESSENTIAL HYPERTENSION: ICD-10-CM

## 2021-04-16 DIAGNOSIS — M51.37 DEGENERATION OF LUMBAR OR LUMBOSACRAL INTERVERTEBRAL DISC: ICD-10-CM

## 2021-04-16 PROCEDURE — 99999 PR PBB SHADOW E&M-EST. PATIENT-LVL III: ICD-10-PCS | Mod: PBBFAC,,, | Performed by: FAMILY MEDICINE

## 2021-04-16 PROCEDURE — 99213 OFFICE O/P EST LOW 20 MIN: CPT | Mod: PBBFAC,PO | Performed by: FAMILY MEDICINE

## 2021-04-16 PROCEDURE — 99499 RISK ADDL DX/OHS AUDIT: ICD-10-PCS | Mod: S$GLB,,, | Performed by: FAMILY MEDICINE

## 2021-04-16 PROCEDURE — 99999 PR PBB SHADOW E&M-EST. PATIENT-LVL III: CPT | Mod: PBBFAC,,, | Performed by: FAMILY MEDICINE

## 2021-04-16 PROCEDURE — 99215 OFFICE O/P EST HI 40 MIN: CPT | Mod: S$GLB,,, | Performed by: FAMILY MEDICINE

## 2021-04-16 PROCEDURE — 99499 UNLISTED E&M SERVICE: CPT | Mod: S$GLB,,, | Performed by: FAMILY MEDICINE

## 2021-04-16 PROCEDURE — 99215 PR OFFICE/OUTPT VISIT, EST, LEVL V, 40-54 MIN: ICD-10-PCS | Mod: S$GLB,,, | Performed by: FAMILY MEDICINE

## 2021-04-16 RX ORDER — AMLODIPINE BESYLATE 5 MG/1
5 TABLET ORAL DAILY
Qty: 30 TABLET | Refills: 5 | Status: SHIPPED | OUTPATIENT
Start: 2021-04-16 | End: 2022-01-04 | Stop reason: SDUPTHER

## 2021-04-16 RX ORDER — OXYCODONE AND ACETAMINOPHEN 10; 325 MG/1; MG/1
1 TABLET ORAL 3 TIMES DAILY PRN
Qty: 90 TABLET | Refills: 0 | Status: SHIPPED | OUTPATIENT
Start: 2021-06-16 | End: 2021-07-16 | Stop reason: SDUPTHER

## 2021-04-16 RX ORDER — OXYCODONE AND ACETAMINOPHEN 10; 325 MG/1; MG/1
1 TABLET ORAL 3 TIMES DAILY PRN
Qty: 90 TABLET | Refills: 0 | Status: SHIPPED | OUTPATIENT
Start: 2021-05-17 | End: 2021-06-16

## 2021-05-24 ENCOUNTER — PES CALL (OUTPATIENT)
Dept: ADMINISTRATIVE | Facility: CLINIC | Age: 51
End: 2021-05-24

## 2021-06-04 DIAGNOSIS — K50.10 CROHN'S DISEASE OF LARGE INTESTINE WITHOUT COMPLICATION: ICD-10-CM

## 2021-06-04 DIAGNOSIS — M47.899 HLA-B27 SPONDYLOARTHROPATHY: ICD-10-CM

## 2021-06-04 DIAGNOSIS — M45.0 ANKYLOSING SPONDYLITIS OF MULTIPLE SITES IN SPINE: ICD-10-CM

## 2021-06-04 DIAGNOSIS — M06.4 INFLAMMATORY POLYARTHRITIS: ICD-10-CM

## 2021-06-07 ENCOUNTER — TELEPHONE (OUTPATIENT)
Dept: FAMILY MEDICINE | Facility: CLINIC | Age: 51
End: 2021-06-07

## 2021-06-07 RX ORDER — ADALIMUMAB 40MG/0.8ML
40 KIT SUBCUTANEOUS
Qty: 12 PEN | Refills: 2 | Status: SHIPPED | OUTPATIENT
Start: 2021-06-07 | End: 2021-10-15 | Stop reason: SDUPTHER

## 2021-06-29 DIAGNOSIS — E78.5 HYPERLIPIDEMIA, ACQUIRED: ICD-10-CM

## 2021-06-29 DIAGNOSIS — K21.9 GASTROESOPHAGEAL REFLUX DISEASE WITHOUT ESOPHAGITIS: ICD-10-CM

## 2021-06-29 RX ORDER — FAMOTIDINE 40 MG/1
40 TABLET, FILM COATED ORAL EVERY 12 HOURS
Qty: 180 TABLET | Refills: 0 | Status: SHIPPED | OUTPATIENT
Start: 2021-06-29 | End: 2021-10-22 | Stop reason: SDUPTHER

## 2021-06-29 RX ORDER — PRAVASTATIN SODIUM 20 MG/1
20 TABLET ORAL NIGHTLY
Qty: 90 TABLET | Refills: 1 | Status: SHIPPED | OUTPATIENT
Start: 2021-06-29 | End: 2021-11-02 | Stop reason: SDUPTHER

## 2021-07-16 ENCOUNTER — OFFICE VISIT (OUTPATIENT)
Dept: FAMILY MEDICINE | Facility: CLINIC | Age: 51
End: 2021-07-16
Payer: MEDICARE

## 2021-07-16 VITALS
DIASTOLIC BLOOD PRESSURE: 80 MMHG | RESPIRATION RATE: 16 BRPM | OXYGEN SATURATION: 98 % | BODY MASS INDEX: 28.79 KG/M2 | SYSTOLIC BLOOD PRESSURE: 126 MMHG | TEMPERATURE: 99 F | HEIGHT: 65 IN | HEART RATE: 83 BPM | WEIGHT: 172.81 LBS

## 2021-07-16 DIAGNOSIS — M45.0 ANKYLOSING SPONDYLITIS OF MULTIPLE SITES IN SPINE: Primary | ICD-10-CM

## 2021-07-16 DIAGNOSIS — M06.4 INFLAMMATORY POLYARTHRITIS: ICD-10-CM

## 2021-07-16 DIAGNOSIS — Z12.11 ENCOUNTER FOR COLORECTAL CANCER SCREENING: ICD-10-CM

## 2021-07-16 DIAGNOSIS — M51.37 DEGENERATION OF LUMBAR OR LUMBOSACRAL INTERVERTEBRAL DISC: ICD-10-CM

## 2021-07-16 DIAGNOSIS — L02.32 BOIL OF BUTTOCK: ICD-10-CM

## 2021-07-16 DIAGNOSIS — Z86.39 PERSONAL HISTORY OF OTHER ENDOCRINE, NUTRITIONAL AND METABOLIC DISEASE: ICD-10-CM

## 2021-07-16 DIAGNOSIS — Z12.5 SPECIAL SCREENING FOR MALIGNANT NEOPLASM OF PROSTATE: ICD-10-CM

## 2021-07-16 DIAGNOSIS — E55.9 VITAMIN D INSUFFICIENCY: ICD-10-CM

## 2021-07-16 DIAGNOSIS — I10 ESSENTIAL HYPERTENSION: ICD-10-CM

## 2021-07-16 DIAGNOSIS — Z12.12 ENCOUNTER FOR COLORECTAL CANCER SCREENING: ICD-10-CM

## 2021-07-16 DIAGNOSIS — K50.10 CROHN'S DISEASE OF LARGE INTESTINE WITHOUT COMPLICATION: ICD-10-CM

## 2021-07-16 PROCEDURE — 1160F PR REVIEW ALL MEDS BY PRESCRIBER/CLIN PHARMACIST DOCUMENTED: ICD-10-PCS | Mod: CPTII,S$GLB,, | Performed by: FAMILY MEDICINE

## 2021-07-16 PROCEDURE — 99499 UNLISTED E&M SERVICE: CPT | Mod: S$PBB,,, | Performed by: FAMILY MEDICINE

## 2021-07-16 PROCEDURE — 99215 PR OFFICE/OUTPT VISIT, EST, LEVL V, 40-54 MIN: ICD-10-PCS | Mod: S$GLB,,, | Performed by: FAMILY MEDICINE

## 2021-07-16 PROCEDURE — 3079F PR MOST RECENT DIASTOLIC BLOOD PRESSURE 80-89 MM HG: ICD-10-PCS | Mod: CPTII,S$GLB,, | Performed by: FAMILY MEDICINE

## 2021-07-16 PROCEDURE — 99999 PR PBB SHADOW E&M-EST. PATIENT-LVL III: CPT | Mod: PBBFAC,,, | Performed by: FAMILY MEDICINE

## 2021-07-16 PROCEDURE — 3079F DIAST BP 80-89 MM HG: CPT | Mod: CPTII,S$GLB,, | Performed by: FAMILY MEDICINE

## 2021-07-16 PROCEDURE — 99999 PR PBB SHADOW E&M-EST. PATIENT-LVL III: ICD-10-PCS | Mod: PBBFAC,,, | Performed by: FAMILY MEDICINE

## 2021-07-16 PROCEDURE — 99499 RISK ADDL DX/OHS AUDIT: ICD-10-PCS | Mod: S$PBB,,, | Performed by: FAMILY MEDICINE

## 2021-07-16 PROCEDURE — 1125F PR PAIN SEVERITY QUANTIFIED, PAIN PRESENT: ICD-10-PCS | Mod: CPTII,S$GLB,, | Performed by: FAMILY MEDICINE

## 2021-07-16 PROCEDURE — 1159F MED LIST DOCD IN RCRD: CPT | Mod: CPTII,S$GLB,, | Performed by: FAMILY MEDICINE

## 2021-07-16 PROCEDURE — 3074F PR MOST RECENT SYSTOLIC BLOOD PRESSURE < 130 MM HG: ICD-10-PCS | Mod: CPTII,S$GLB,, | Performed by: FAMILY MEDICINE

## 2021-07-16 PROCEDURE — 3008F BODY MASS INDEX DOCD: CPT | Mod: CPTII,S$GLB,, | Performed by: FAMILY MEDICINE

## 2021-07-16 PROCEDURE — 99215 OFFICE O/P EST HI 40 MIN: CPT | Mod: S$GLB,,, | Performed by: FAMILY MEDICINE

## 2021-07-16 PROCEDURE — 1125F AMNT PAIN NOTED PAIN PRSNT: CPT | Mod: CPTII,S$GLB,, | Performed by: FAMILY MEDICINE

## 2021-07-16 PROCEDURE — 3074F SYST BP LT 130 MM HG: CPT | Mod: CPTII,S$GLB,, | Performed by: FAMILY MEDICINE

## 2021-07-16 PROCEDURE — 3008F PR BODY MASS INDEX (BMI) DOCUMENTED: ICD-10-PCS | Mod: CPTII,S$GLB,, | Performed by: FAMILY MEDICINE

## 2021-07-16 PROCEDURE — 1160F RVW MEDS BY RX/DR IN RCRD: CPT | Mod: CPTII,S$GLB,, | Performed by: FAMILY MEDICINE

## 2021-07-16 PROCEDURE — 1159F PR MEDICATION LIST DOCUMENTED IN MEDICAL RECORD: ICD-10-PCS | Mod: CPTII,S$GLB,, | Performed by: FAMILY MEDICINE

## 2021-07-16 RX ORDER — SULFAMETHOXAZOLE AND TRIMETHOPRIM 800; 160 MG/1; MG/1
1 TABLET ORAL 2 TIMES DAILY
Qty: 14 TABLET | Refills: 0 | Status: SHIPPED | OUTPATIENT
Start: 2021-07-16 | End: 2021-10-15

## 2021-07-16 RX ORDER — OXYCODONE AND ACETAMINOPHEN 10; 325 MG/1; MG/1
1 TABLET ORAL 3 TIMES DAILY PRN
Qty: 90 TABLET | Refills: 0 | Status: SHIPPED | OUTPATIENT
Start: 2021-09-10 | End: 2021-10-15 | Stop reason: SDUPTHER

## 2021-07-16 RX ORDER — TRIAMCINOLONE ACETONIDE 5 MG/G
OINTMENT TOPICAL
COMMUNITY
Start: 2021-06-29 | End: 2021-10-15

## 2021-07-16 RX ORDER — OXYCODONE AND ACETAMINOPHEN 10; 325 MG/1; MG/1
1 TABLET ORAL 3 TIMES DAILY PRN
Qty: 90 TABLET | Refills: 0 | Status: SHIPPED | OUTPATIENT
Start: 2021-07-16 | End: 2021-08-15

## 2021-07-16 RX ORDER — OXYCODONE AND ACETAMINOPHEN 10; 325 MG/1; MG/1
1 TABLET ORAL 3 TIMES DAILY PRN
Qty: 90 TABLET | Refills: 0 | Status: SHIPPED | OUTPATIENT
Start: 2021-08-13 | End: 2021-09-12

## 2021-07-27 RX ORDER — ERGOCALCIFEROL 1.25 MG/1
50000 CAPSULE ORAL
Qty: 60 CAPSULE | Refills: 0 | Status: SHIPPED | OUTPATIENT
Start: 2021-07-28 | End: 2021-09-26

## 2021-08-17 ENCOUNTER — TELEPHONE (OUTPATIENT)
Dept: FAMILY MEDICINE | Facility: CLINIC | Age: 51
End: 2021-08-17

## 2021-08-19 ENCOUNTER — LAB VISIT (OUTPATIENT)
Dept: LAB | Facility: HOSPITAL | Age: 51
End: 2021-08-19
Attending: FAMILY MEDICINE
Payer: MEDICARE

## 2021-08-19 DIAGNOSIS — I10 ESSENTIAL HYPERTENSION: ICD-10-CM

## 2021-08-19 DIAGNOSIS — Z12.5 SPECIAL SCREENING FOR MALIGNANT NEOPLASM OF PROSTATE: ICD-10-CM

## 2021-08-19 DIAGNOSIS — K50.10 CROHN'S DISEASE OF LARGE INTESTINE WITHOUT COMPLICATION: ICD-10-CM

## 2021-08-19 DIAGNOSIS — Z86.39 PERSONAL HISTORY OF OTHER ENDOCRINE, NUTRITIONAL AND METABOLIC DISEASE: ICD-10-CM

## 2021-08-19 DIAGNOSIS — E55.9 VITAMIN D INSUFFICIENCY: ICD-10-CM

## 2021-08-19 LAB
25(OH)D3+25(OH)D2 SERPL-MCNC: 29 NG/ML (ref 30–96)
ALBUMIN SERPL BCP-MCNC: 3.5 G/DL (ref 3.5–5.2)
ALP SERPL-CCNC: 71 U/L (ref 55–135)
ALT SERPL W/O P-5'-P-CCNC: 17 U/L (ref 10–44)
ANION GAP SERPL CALC-SCNC: 8 MMOL/L (ref 8–16)
AST SERPL-CCNC: 18 U/L (ref 10–40)
BASOPHILS # BLD AUTO: 0.04 K/UL (ref 0–0.2)
BASOPHILS NFR BLD: 0.7 % (ref 0–1.9)
BILIRUB SERPL-MCNC: 0.3 MG/DL (ref 0.1–1)
BUN SERPL-MCNC: 19 MG/DL (ref 6–20)
CALCIUM SERPL-MCNC: 8.6 MG/DL (ref 8.7–10.5)
CHLORIDE SERPL-SCNC: 109 MMOL/L (ref 95–110)
CHOLEST SERPL-MCNC: 212 MG/DL (ref 120–199)
CHOLEST/HDLC SERPL: 4.9 {RATIO} (ref 2–5)
CO2 SERPL-SCNC: 24 MMOL/L (ref 23–29)
COMPLEXED PSA SERPL-MCNC: 0.8 NG/ML (ref 0–4)
CREAT SERPL-MCNC: 1.2 MG/DL (ref 0.5–1.4)
DIFFERENTIAL METHOD: ABNORMAL
EOSINOPHIL # BLD AUTO: 0.2 K/UL (ref 0–0.5)
EOSINOPHIL NFR BLD: 3.3 % (ref 0–8)
ERYTHROCYTE [DISTWIDTH] IN BLOOD BY AUTOMATED COUNT: 15.3 % (ref 11.5–14.5)
EST. GFR  (AFRICAN AMERICAN): >60 ML/MIN/1.73 M^2
EST. GFR  (NON AFRICAN AMERICAN): >60 ML/MIN/1.73 M^2
ESTIMATED AVG GLUCOSE: 105 MG/DL (ref 68–131)
GLUCOSE SERPL-MCNC: 87 MG/DL (ref 70–110)
HBA1C MFR BLD: 5.3 % (ref 4–5.6)
HCT VFR BLD AUTO: 34 % (ref 40–54)
HDLC SERPL-MCNC: 43 MG/DL (ref 40–75)
HDLC SERPL: 20.3 % (ref 20–50)
HGB BLD-MCNC: 11 G/DL (ref 14–18)
IMM GRANULOCYTES # BLD AUTO: 0.01 K/UL (ref 0–0.04)
IMM GRANULOCYTES NFR BLD AUTO: 0.2 % (ref 0–0.5)
LDLC SERPL CALC-MCNC: 136.8 MG/DL (ref 63–159)
LYMPHOCYTES # BLD AUTO: 3.4 K/UL (ref 1–4.8)
LYMPHOCYTES NFR BLD: 59.1 % (ref 18–48)
MCH RBC QN AUTO: 23.7 PG (ref 27–31)
MCHC RBC AUTO-ENTMCNC: 32.4 G/DL (ref 32–36)
MCV RBC AUTO: 73 FL (ref 82–98)
MONOCYTES # BLD AUTO: 0.6 K/UL (ref 0.3–1)
MONOCYTES NFR BLD: 9.6 % (ref 4–15)
NEUTROPHILS # BLD AUTO: 1.6 K/UL (ref 1.8–7.7)
NEUTROPHILS NFR BLD: 27.1 % (ref 38–73)
NONHDLC SERPL-MCNC: 169 MG/DL
NRBC BLD-RTO: 0 /100 WBC
PLATELET # BLD AUTO: 246 K/UL (ref 150–450)
PMV BLD AUTO: 11.2 FL (ref 9.2–12.9)
POTASSIUM SERPL-SCNC: 4 MMOL/L (ref 3.5–5.1)
PROT SERPL-MCNC: 6.7 G/DL (ref 6–8.4)
RBC # BLD AUTO: 4.64 M/UL (ref 4.6–6.2)
SODIUM SERPL-SCNC: 141 MMOL/L (ref 136–145)
T4 FREE SERPL-MCNC: 0.85 NG/DL (ref 0.71–1.51)
TRIGL SERPL-MCNC: 161 MG/DL (ref 30–150)
TSH SERPL DL<=0.005 MIU/L-ACNC: 1.16 UIU/ML (ref 0.4–4)
WBC # BLD AUTO: 5.82 K/UL (ref 3.9–12.7)

## 2021-08-19 PROCEDURE — 84153 ASSAY OF PSA TOTAL: CPT | Performed by: FAMILY MEDICINE

## 2021-08-19 PROCEDURE — 82306 VITAMIN D 25 HYDROXY: CPT | Performed by: FAMILY MEDICINE

## 2021-08-19 PROCEDURE — 84439 ASSAY OF FREE THYROXINE: CPT | Performed by: FAMILY MEDICINE

## 2021-08-19 PROCEDURE — 84443 ASSAY THYROID STIM HORMONE: CPT | Performed by: FAMILY MEDICINE

## 2021-08-19 PROCEDURE — 80061 LIPID PANEL: CPT | Performed by: FAMILY MEDICINE

## 2021-08-19 PROCEDURE — 85025 COMPLETE CBC W/AUTO DIFF WBC: CPT | Performed by: FAMILY MEDICINE

## 2021-08-19 PROCEDURE — 83036 HEMOGLOBIN GLYCOSYLATED A1C: CPT | Performed by: FAMILY MEDICINE

## 2021-08-19 PROCEDURE — 80053 COMPREHEN METABOLIC PANEL: CPT | Performed by: FAMILY MEDICINE

## 2021-08-19 PROCEDURE — 36415 COLL VENOUS BLD VENIPUNCTURE: CPT | Mod: PO | Performed by: FAMILY MEDICINE

## 2021-08-23 ENCOUNTER — PES CALL (OUTPATIENT)
Dept: ADMINISTRATIVE | Facility: CLINIC | Age: 51
End: 2021-08-23

## 2021-08-24 ENCOUNTER — TELEPHONE (OUTPATIENT)
Dept: INTERNAL MEDICINE | Facility: CLINIC | Age: 51
End: 2021-08-24

## 2021-09-17 ENCOUNTER — TELEPHONE (OUTPATIENT)
Dept: INTERNAL MEDICINE | Facility: CLINIC | Age: 51
End: 2021-09-17

## 2021-09-28 ENCOUNTER — PES CALL (OUTPATIENT)
Dept: ADMINISTRATIVE | Facility: CLINIC | Age: 51
End: 2021-09-28

## 2021-10-04 ENCOUNTER — PES CALL (OUTPATIENT)
Dept: ADMINISTRATIVE | Facility: CLINIC | Age: 51
End: 2021-10-04

## 2021-10-04 ENCOUNTER — TELEPHONE (OUTPATIENT)
Dept: FAMILY MEDICINE | Facility: CLINIC | Age: 51
End: 2021-10-04

## 2021-10-15 ENCOUNTER — OFFICE VISIT (OUTPATIENT)
Dept: FAMILY MEDICINE | Facility: CLINIC | Age: 51
End: 2021-10-15
Payer: MEDICARE

## 2021-10-15 VITALS
WEIGHT: 171.75 LBS | HEIGHT: 65 IN | RESPIRATION RATE: 16 BRPM | SYSTOLIC BLOOD PRESSURE: 134 MMHG | BODY MASS INDEX: 28.61 KG/M2 | TEMPERATURE: 99 F | OXYGEN SATURATION: 98 % | HEART RATE: 76 BPM | DIASTOLIC BLOOD PRESSURE: 86 MMHG

## 2021-10-15 DIAGNOSIS — Z12.11 ENCOUNTER FOR COLORECTAL CANCER SCREENING: ICD-10-CM

## 2021-10-15 DIAGNOSIS — K50.10 CROHN'S DISEASE OF LARGE INTESTINE WITHOUT COMPLICATION: ICD-10-CM

## 2021-10-15 DIAGNOSIS — M06.4 INFLAMMATORY POLYARTHRITIS: ICD-10-CM

## 2021-10-15 DIAGNOSIS — M45.0 ANKYLOSING SPONDYLITIS OF MULTIPLE SITES IN SPINE: Primary | ICD-10-CM

## 2021-10-15 DIAGNOSIS — Z12.12 ENCOUNTER FOR COLORECTAL CANCER SCREENING: ICD-10-CM

## 2021-10-15 DIAGNOSIS — M47.899 HLA-B27 SPONDYLOARTHROPATHY: ICD-10-CM

## 2021-10-15 PROCEDURE — 99499 UNLISTED E&M SERVICE: CPT | Mod: S$GLB,,, | Performed by: FAMILY MEDICINE

## 2021-10-15 PROCEDURE — 1159F PR MEDICATION LIST DOCUMENTED IN MEDICAL RECORD: ICD-10-PCS | Mod: HCNC,CPTII,S$GLB, | Performed by: FAMILY MEDICINE

## 2021-10-15 PROCEDURE — 3044F PR MOST RECENT HEMOGLOBIN A1C LEVEL <7.0%: ICD-10-PCS | Mod: HCNC,CPTII,S$GLB, | Performed by: FAMILY MEDICINE

## 2021-10-15 PROCEDURE — 1160F RVW MEDS BY RX/DR IN RCRD: CPT | Mod: HCNC,CPTII,S$GLB, | Performed by: FAMILY MEDICINE

## 2021-10-15 PROCEDURE — 99215 PR OFFICE/OUTPT VISIT, EST, LEVL V, 40-54 MIN: ICD-10-PCS | Mod: HCNC,S$GLB,, | Performed by: FAMILY MEDICINE

## 2021-10-15 PROCEDURE — 1159F MED LIST DOCD IN RCRD: CPT | Mod: HCNC,CPTII,S$GLB, | Performed by: FAMILY MEDICINE

## 2021-10-15 PROCEDURE — 3008F PR BODY MASS INDEX (BMI) DOCUMENTED: ICD-10-PCS | Mod: HCNC,CPTII,S$GLB, | Performed by: FAMILY MEDICINE

## 2021-10-15 PROCEDURE — 1160F PR REVIEW ALL MEDS BY PRESCRIBER/CLIN PHARMACIST DOCUMENTED: ICD-10-PCS | Mod: HCNC,CPTII,S$GLB, | Performed by: FAMILY MEDICINE

## 2021-10-15 PROCEDURE — 3075F PR MOST RECENT SYSTOLIC BLOOD PRESS GE 130-139MM HG: ICD-10-PCS | Mod: HCNC,CPTII,S$GLB, | Performed by: FAMILY MEDICINE

## 2021-10-15 PROCEDURE — 99999 PR PBB SHADOW E&M-EST. PATIENT-LVL V: CPT | Mod: PBBFAC,HCNC,, | Performed by: FAMILY MEDICINE

## 2021-10-15 PROCEDURE — 3044F HG A1C LEVEL LT 7.0%: CPT | Mod: HCNC,CPTII,S$GLB, | Performed by: FAMILY MEDICINE

## 2021-10-15 PROCEDURE — 3008F BODY MASS INDEX DOCD: CPT | Mod: HCNC,CPTII,S$GLB, | Performed by: FAMILY MEDICINE

## 2021-10-15 PROCEDURE — 3079F PR MOST RECENT DIASTOLIC BLOOD PRESSURE 80-89 MM HG: ICD-10-PCS | Mod: HCNC,CPTII,S$GLB, | Performed by: FAMILY MEDICINE

## 2021-10-15 PROCEDURE — 99499 RISK ADDL DX/OHS AUDIT: ICD-10-PCS | Mod: S$GLB,,, | Performed by: FAMILY MEDICINE

## 2021-10-15 PROCEDURE — 99999 PR PBB SHADOW E&M-EST. PATIENT-LVL V: ICD-10-PCS | Mod: PBBFAC,HCNC,, | Performed by: FAMILY MEDICINE

## 2021-10-15 PROCEDURE — 99215 OFFICE O/P EST HI 40 MIN: CPT | Mod: HCNC,S$GLB,, | Performed by: FAMILY MEDICINE

## 2021-10-15 PROCEDURE — 3075F SYST BP GE 130 - 139MM HG: CPT | Mod: HCNC,CPTII,S$GLB, | Performed by: FAMILY MEDICINE

## 2021-10-15 PROCEDURE — 3079F DIAST BP 80-89 MM HG: CPT | Mod: HCNC,CPTII,S$GLB, | Performed by: FAMILY MEDICINE

## 2021-10-15 RX ORDER — OXYCODONE AND ACETAMINOPHEN 10; 325 MG/1; MG/1
1 TABLET ORAL 3 TIMES DAILY PRN
Qty: 90 TABLET | Refills: 0 | Status: SHIPPED | OUTPATIENT
Start: 2021-12-10 | End: 2022-01-04 | Stop reason: SDUPTHER

## 2021-10-15 RX ORDER — OXYCODONE AND ACETAMINOPHEN 10; 325 MG/1; MG/1
1 TABLET ORAL 3 TIMES DAILY PRN
Qty: 90 TABLET | Refills: 0 | Status: SHIPPED | OUTPATIENT
Start: 2021-10-15 | End: 2021-11-14

## 2021-10-15 RX ORDER — METHYLPREDNISOLONE 4 MG/1
TABLET ORAL
Qty: 1 PACKAGE | Refills: 5 | Status: SHIPPED | OUTPATIENT
Start: 2021-10-15 | End: 2022-04-12

## 2021-10-15 RX ORDER — OXYCODONE AND ACETAMINOPHEN 10; 325 MG/1; MG/1
1 TABLET ORAL 3 TIMES DAILY PRN
Qty: 90 TABLET | Refills: 0 | Status: SHIPPED | OUTPATIENT
Start: 2021-11-12 | End: 2021-12-12

## 2021-10-15 RX ORDER — ADALIMUMAB 40MG/0.8ML
40 KIT SUBCUTANEOUS
Qty: 12 PEN | Refills: 2 | Status: SHIPPED | OUTPATIENT
Start: 2021-10-15 | End: 2021-11-02 | Stop reason: SDUPTHER

## 2021-10-15 RX ORDER — CHOLECALCIFEROL (VITAMIN D3) 125 MCG
5000 CAPSULE ORAL
COMMUNITY
End: 2021-11-02 | Stop reason: SDUPTHER

## 2021-10-22 DIAGNOSIS — K21.9 GASTROESOPHAGEAL REFLUX DISEASE WITHOUT ESOPHAGITIS: ICD-10-CM

## 2021-10-25 RX ORDER — FAMOTIDINE 40 MG/1
40 TABLET, FILM COATED ORAL EVERY 12 HOURS
Qty: 180 TABLET | Refills: 0 | Status: SHIPPED | OUTPATIENT
Start: 2021-10-25 | End: 2021-11-02 | Stop reason: SDUPTHER

## 2021-11-02 DIAGNOSIS — M06.4 INFLAMMATORY POLYARTHRITIS: ICD-10-CM

## 2021-11-02 DIAGNOSIS — K50.10 CROHN'S DISEASE OF LARGE INTESTINE WITHOUT COMPLICATION: ICD-10-CM

## 2021-11-02 DIAGNOSIS — E78.5 HYPERLIPIDEMIA, ACQUIRED: ICD-10-CM

## 2021-11-02 DIAGNOSIS — M47.899 HLA-B27 SPONDYLOARTHROPATHY: ICD-10-CM

## 2021-11-02 DIAGNOSIS — K21.9 GASTROESOPHAGEAL REFLUX DISEASE WITHOUT ESOPHAGITIS: ICD-10-CM

## 2021-11-07 RX ORDER — CHOLECALCIFEROL (VITAMIN D3) 125 MCG
5000 CAPSULE ORAL DAILY
Qty: 90 CAPSULE | Refills: 3 | Status: SHIPPED | OUTPATIENT
Start: 2021-11-07 | End: 2022-08-22

## 2021-11-07 RX ORDER — CLONAZEPAM 2 MG/1
2 TABLET ORAL NIGHTLY
Qty: 30 TABLET | Refills: 0 | Status: SHIPPED | OUTPATIENT
Start: 2021-11-07 | End: 2023-03-02

## 2021-11-07 RX ORDER — ADALIMUMAB 40MG/0.8ML
40 KIT SUBCUTANEOUS
Qty: 12 PEN | Refills: 2 | Status: SHIPPED | OUTPATIENT
Start: 2021-11-07 | End: 2022-01-04 | Stop reason: SDUPTHER

## 2021-11-07 RX ORDER — PRAVASTATIN SODIUM 20 MG/1
20 TABLET ORAL NIGHTLY
Qty: 90 TABLET | Refills: 1 | Status: SHIPPED | OUTPATIENT
Start: 2021-11-07 | End: 2022-03-07 | Stop reason: SDUPTHER

## 2021-11-07 RX ORDER — FAMOTIDINE 40 MG/1
40 TABLET, FILM COATED ORAL EVERY 12 HOURS
Qty: 180 TABLET | Refills: 0 | Status: SHIPPED | OUTPATIENT
Start: 2021-11-07 | End: 2022-01-04 | Stop reason: SDUPTHER

## 2021-12-06 ENCOUNTER — TELEPHONE (OUTPATIENT)
Dept: FAMILY MEDICINE | Facility: CLINIC | Age: 51
End: 2021-12-06
Payer: MEDICAID

## 2022-01-04 ENCOUNTER — OFFICE VISIT (OUTPATIENT)
Dept: FAMILY MEDICINE | Facility: CLINIC | Age: 52
End: 2022-01-04
Payer: MEDICARE

## 2022-01-04 VITALS
DIASTOLIC BLOOD PRESSURE: 80 MMHG | HEIGHT: 65 IN | BODY MASS INDEX: 29.02 KG/M2 | HEART RATE: 73 BPM | SYSTOLIC BLOOD PRESSURE: 132 MMHG | WEIGHT: 174.19 LBS | TEMPERATURE: 99 F | RESPIRATION RATE: 16 BRPM | OXYGEN SATURATION: 99 %

## 2022-01-04 DIAGNOSIS — M47.899 HLA-B27 SPONDYLOARTHROPATHY: Primary | ICD-10-CM

## 2022-01-04 DIAGNOSIS — I10 ESSENTIAL HYPERTENSION: ICD-10-CM

## 2022-01-04 DIAGNOSIS — Z12.12 ENCOUNTER FOR COLORECTAL CANCER SCREENING: ICD-10-CM

## 2022-01-04 DIAGNOSIS — F41.1 GENERALIZED ANXIETY DISORDER: ICD-10-CM

## 2022-01-04 DIAGNOSIS — K50.10 CROHN'S DISEASE OF LARGE INTESTINE WITHOUT COMPLICATION: ICD-10-CM

## 2022-01-04 DIAGNOSIS — Z12.11 ENCOUNTER FOR COLORECTAL CANCER SCREENING: ICD-10-CM

## 2022-01-04 DIAGNOSIS — B35.3 TINEA PEDIS OF BOTH FEET: ICD-10-CM

## 2022-01-04 DIAGNOSIS — M45.0 ANKYLOSING SPONDYLITIS OF MULTIPLE SITES IN SPINE: ICD-10-CM

## 2022-01-04 DIAGNOSIS — K21.9 GASTROESOPHAGEAL REFLUX DISEASE WITHOUT ESOPHAGITIS: ICD-10-CM

## 2022-01-04 PROCEDURE — 99499 UNLISTED E&M SERVICE: CPT | Mod: S$GLB,,, | Performed by: FAMILY MEDICINE

## 2022-01-04 PROCEDURE — 96372 PR INJECTION,THERAP/PROPH/DIAG2ST, IM OR SUBCUT: ICD-10-PCS | Mod: HCNC,S$GLB,, | Performed by: FAMILY MEDICINE

## 2022-01-04 PROCEDURE — 99215 PR OFFICE/OUTPT VISIT, EST, LEVL V, 40-54 MIN: ICD-10-PCS | Mod: 25,HCNC,S$GLB, | Performed by: FAMILY MEDICINE

## 2022-01-04 PROCEDURE — 3079F DIAST BP 80-89 MM HG: CPT | Mod: HCNC,CPTII,S$GLB, | Performed by: FAMILY MEDICINE

## 2022-01-04 PROCEDURE — 1159F MED LIST DOCD IN RCRD: CPT | Mod: HCNC,CPTII,S$GLB, | Performed by: FAMILY MEDICINE

## 2022-01-04 PROCEDURE — 99999 PR PBB SHADOW E&M-EST. PATIENT-LVL V: ICD-10-PCS | Mod: PBBFAC,HCNC,, | Performed by: FAMILY MEDICINE

## 2022-01-04 PROCEDURE — 99499 RISK ADDL DX/OHS AUDIT: ICD-10-PCS | Mod: S$GLB,,, | Performed by: FAMILY MEDICINE

## 2022-01-04 PROCEDURE — 99999 PR PBB SHADOW E&M-EST. PATIENT-LVL V: CPT | Mod: PBBFAC,HCNC,, | Performed by: FAMILY MEDICINE

## 2022-01-04 PROCEDURE — 3075F SYST BP GE 130 - 139MM HG: CPT | Mod: HCNC,CPTII,S$GLB, | Performed by: FAMILY MEDICINE

## 2022-01-04 PROCEDURE — 1160F PR REVIEW ALL MEDS BY PRESCRIBER/CLIN PHARMACIST DOCUMENTED: ICD-10-PCS | Mod: HCNC,CPTII,S$GLB, | Performed by: FAMILY MEDICINE

## 2022-01-04 PROCEDURE — 96372 THER/PROPH/DIAG INJ SC/IM: CPT | Mod: HCNC,S$GLB,, | Performed by: FAMILY MEDICINE

## 2022-01-04 PROCEDURE — 3075F PR MOST RECENT SYSTOLIC BLOOD PRESS GE 130-139MM HG: ICD-10-PCS | Mod: HCNC,CPTII,S$GLB, | Performed by: FAMILY MEDICINE

## 2022-01-04 PROCEDURE — 3079F PR MOST RECENT DIASTOLIC BLOOD PRESSURE 80-89 MM HG: ICD-10-PCS | Mod: HCNC,CPTII,S$GLB, | Performed by: FAMILY MEDICINE

## 2022-01-04 PROCEDURE — 3008F BODY MASS INDEX DOCD: CPT | Mod: HCNC,CPTII,S$GLB, | Performed by: FAMILY MEDICINE

## 2022-01-04 PROCEDURE — 99215 OFFICE O/P EST HI 40 MIN: CPT | Mod: 25,HCNC,S$GLB, | Performed by: FAMILY MEDICINE

## 2022-01-04 PROCEDURE — 1159F PR MEDICATION LIST DOCUMENTED IN MEDICAL RECORD: ICD-10-PCS | Mod: HCNC,CPTII,S$GLB, | Performed by: FAMILY MEDICINE

## 2022-01-04 PROCEDURE — 3008F PR BODY MASS INDEX (BMI) DOCUMENTED: ICD-10-PCS | Mod: HCNC,CPTII,S$GLB, | Performed by: FAMILY MEDICINE

## 2022-01-04 PROCEDURE — 1160F RVW MEDS BY RX/DR IN RCRD: CPT | Mod: HCNC,CPTII,S$GLB, | Performed by: FAMILY MEDICINE

## 2022-01-04 RX ORDER — NYSTATIN 100000 [USP'U]/G
POWDER TOPICAL 2 TIMES DAILY
Qty: 120 G | Refills: 2 | Status: SHIPPED | OUTPATIENT
Start: 2022-01-04 | End: 2023-03-02

## 2022-01-04 RX ORDER — OXYCODONE AND ACETAMINOPHEN 10; 325 MG/1; MG/1
1 TABLET ORAL 3 TIMES DAILY PRN
Qty: 90 TABLET | Refills: 0 | Status: SHIPPED | OUTPATIENT
Start: 2022-01-10 | End: 2022-02-09

## 2022-01-04 RX ORDER — OXYCODONE AND ACETAMINOPHEN 10; 325 MG/1; MG/1
1 TABLET ORAL 3 TIMES DAILY PRN
Qty: 90 TABLET | Refills: 0 | Status: SHIPPED | OUTPATIENT
Start: 2022-03-10 | End: 2022-04-12 | Stop reason: SDUPTHER

## 2022-01-04 RX ORDER — FAMOTIDINE 40 MG/1
40 TABLET, FILM COATED ORAL EVERY 12 HOURS
Qty: 60 TABLET | Refills: 5 | Status: SHIPPED | OUTPATIENT
Start: 2022-01-04 | End: 2022-04-04 | Stop reason: SDUPTHER

## 2022-01-04 RX ORDER — OXYCODONE AND ACETAMINOPHEN 10; 325 MG/1; MG/1
1 TABLET ORAL 3 TIMES DAILY PRN
Qty: 90 TABLET | Refills: 0 | Status: SHIPPED | OUTPATIENT
Start: 2022-02-08 | End: 2022-03-10

## 2022-01-04 RX ORDER — ADALIMUMAB 40MG/0.8ML
40 KIT SUBCUTANEOUS
Qty: 12 PEN | Refills: 2 | Status: SHIPPED | OUTPATIENT
Start: 2022-01-04 | End: 2022-04-12 | Stop reason: SDUPTHER

## 2022-01-04 RX ORDER — AMLODIPINE BESYLATE 5 MG/1
5 TABLET ORAL DAILY
Qty: 30 TABLET | Refills: 5 | Status: SHIPPED | OUTPATIENT
Start: 2022-01-04 | End: 2022-04-12 | Stop reason: SDUPTHER

## 2022-01-04 RX ORDER — KETOROLAC TROMETHAMINE 30 MG/ML
60 INJECTION, SOLUTION INTRAMUSCULAR; INTRAVENOUS
Status: COMPLETED | OUTPATIENT
Start: 2022-01-04 | End: 2022-01-04

## 2022-01-04 RX ORDER — CLONAZEPAM 2 MG/1
2 TABLET ORAL NIGHTLY
Qty: 30 TABLET | Refills: 11 | Status: CANCELLED | OUTPATIENT
Start: 2022-01-04

## 2022-01-04 RX ADMIN — KETOROLAC TROMETHAMINE 60 MG: 30 INJECTION, SOLUTION INTRAMUSCULAR; INTRAVENOUS at 09:01

## 2022-01-04 NOTE — PROGRESS NOTES
Chief Complaint   Patient presents with    Ankylosing spondylitis of multiple sites in spine     Follow up     Foot Injury       Beau Ferraro is a 51 y.o. male who presents per chief complain.  Chronic medical issues, if present, have been documented.  Acute medical issues, if present have been documented in the Chief Complaint.     Hypertension  This is a new problem. The current episode started more than 1 month ago. The problem has been rapidly improving since onset. The problem is controlled. Associated symptoms include neck pain (improving). Pertinent negatives include no anxiety, blurred vision, chest pain, headaches, malaise/fatigue, orthopnea, palpitations, peripheral edema, PND, shortness of breath or sweats. There are no associated agents to hypertension. Risk factors for coronary artery disease include male gender and sedentary lifestyle. Past treatments include calcium channel blockers. The current treatment provides moderate improvement. Compliance problems include exercise and psychosocial issues.  There is no history of angina, kidney disease, CAD/MI, CVA, heart failure, left ventricular hypertrophy, PVD or retinopathy. There is no history of chronic renal disease, coarctation of the aorta, hyperaldosteronism, hypercortisolism, hyperparathyroidism, a hypertension causing med, pheochromocytoma, renovascular disease, sleep apnea or a thyroid problem.   Back Pain  This is a chronic problem. The current episode started more than 1 year ago. The problem occurs daily. The problem is unchanged. The pain is present in the lumbar spine. The quality of the pain is described as aching. The pain does not radiate. The pain is at a severity of 7/10. The pain is moderate. The pain is the same all the time. The symptoms are aggravated by bending, position, standing and lying down. Stiffness is present all day. Associated symptoms include leg pain, paresis and tingling. Pertinent negatives include no abdominal  pain, bladder incontinence, bowel incontinence, chest pain, dysuria, fever, headaches, numbness, paresthesias, pelvic pain, perianal numbness, weakness or weight loss. Risk factors include lack of exercise and poor posture. He has tried analgesics and muscle relaxant for the symptoms. The treatment provided moderate relief.   Arthritis  Presents for follow-up visit. He complains of pain and stiffness. He reports no joint swelling or joint warmth. The symptoms have been stable. Affected locations include the neck, right hip, left hip, right knee, left knee, right ankle, left ankle, right foot, left foot, right wrist, left wrist, right elbow, left elbow, right shoulder and left shoulder. His pain is at a severity of 9/10. Associated symptoms include dry eyes, pain at night, pain while resting, rash (on face and behind ears) and uveitis. Pertinent negatives include no diarrhea, dry mouth, dysuria, fatigue, fever, Raynaud's syndrome or weight loss. Side effects of treatment include joint pain and limb pain.   Chronic Pain  Past Medical History Includes::  Rheumatoid arthritis and chronic pain syndrome  Chronicity:  Chronic  Onset:  More than 1 year ago  Frequency:  Constantly  Progression since onset:  Waxing and waning  Pain location:  Cervical spine, lumbar spine, thoracic spine, knee and hip  Medications Tried:  Hydrocodone/acetaminophen (Hycet, Lorcet, Lortab, Norco,Vicodin), Nerve Block, NSAIDs, Gabapentin (Neurontin) and oxycodone/acetaminophen (Percocet)  Previous Imaging:  MRI Scan and X-Ray  Current treatment:  Oxycodone/acetaminophen (Percocet) and tramadol  Improvement on Current Treatment:  Moderate  Goals of therapy:  Improve ADL's  Associated symptoms: difficulty with ADL's, leg pain, paresis and tingling    Associated symptoms: no abdominal pain, no behavior changes, no upper extremity pain, no bladder incontinence, no bowel incontinence, no chest pain, no constipation, no dizziness, no shortness of  breath, no headaches, no altered mental status, no numbness, no paresthesias, no urinary retention, no weakness, no weight loss, no nausea and no diaphoresis    Drug Screen: No    Pain Contract: Yes    Prescription Monitoring Program  reviewed: Yes    Psychiatric Disorders:  None  History of Substance Abuse:  None    ROS  Review of Systems   Constitutional: Negative.  Negative for activity change, appetite change, chills, diaphoresis, fatigue, fever, malaise/fatigue, unexpected weight change and weight loss.   HENT: Negative.  Negative for congestion, ear pain, hearing loss, nosebleeds, postnasal drip, rhinorrhea, sinus pressure, sneezing, sore throat and trouble swallowing.    Eyes: Negative for blurred vision, pain and visual disturbance.   Respiratory: Negative for apnea, cough, choking, chest tightness and shortness of breath.    Cardiovascular: Negative for chest pain, palpitations, orthopnea, leg swelling and PND.   Gastrointestinal: Negative for abdominal distention, abdominal pain, anal bleeding, blood in stool, bowel incontinence, constipation, diarrhea, nausea, rectal pain and vomiting.   Genitourinary: Negative for bladder incontinence, difficulty urinating, dysuria, flank pain, frequency, pelvic pain, penile pain, penile swelling, scrotal swelling, testicular pain and urgency.   Musculoskeletal: Positive for arthralgias (right hand; most joints), arthritis, back pain, gait problem, myalgias, neck pain (improving), neck stiffness and stiffness. Negative for joint swelling.   Skin: Positive for rash (on face and behind ears). Negative for color change, pallor and wound.   Allergic/Immunologic: Negative for environmental allergies, food allergies and immunocompromised state.   Neurological: Positive for tingling. Negative for dizziness, seizures, syncope, weakness, light-headedness, numbness, headaches and paresthesias.   Psychiatric/Behavioral: Positive for dysphoric mood. Negative for confusion, decreased  "concentration, hallucinations and sleep disturbance. The patient is nervous/anxious. The patient is not hyperactive.      Physical Exam  Vitals:    01/04/22 0911   BP: 132/80   Pulse: 73   Resp: 16   Temp: 99.2 °F (37.3 °C)    Body mass index is 28.98 kg/m².  Weight: 79 kg (174 lb 2.6 oz)   Height: 5' 5" (165.1 cm)     Physical Exam  Vitals reviewed.   Constitutional:       General: He is active. He is not in acute distress.     Appearance: Normal appearance. He is well-developed and well-nourished. He is not ill-appearing, toxic-appearing, sickly-appearing or diaphoretic.   HENT:      Head: Normocephalic and atraumatic.      Right Ear: Hearing, tympanic membrane, ear canal and external ear normal. No decreased hearing noted. No tenderness. No foreign body. No mastoid tenderness. No hemotympanum. Tympanic membrane is not injected, scarred, perforated, erythematous, retracted or bulging.      Left Ear: Hearing, tympanic membrane, ear canal and external ear normal. No decreased hearing noted. No tenderness. No foreign body. No mastoid tenderness. No hemotympanum. Tympanic membrane is not injected, scarred, perforated, erythematous, retracted or bulging.      Nose: Nose normal. No nasal deformity, septal deviation, sinus tenderness, rhinorrhea, epistaxis or foreign body.      Mouth/Throat:      Mouth: Oropharynx is clear and moist and mucous membranes are normal.      Dentition: Normal dentition. Does not have dentures. No dental caries.      Pharynx: Uvula midline. No oropharyngeal exudate, posterior oropharyngeal edema, posterior oropharyngeal erythema or uvula swelling.      Tonsils: No tonsillar abscesses.   Eyes:      General: Lids are normal. No scleral icterus.        Right eye: No foreign body, discharge or hordeolum.         Left eye: No foreign body, discharge or hordeolum.      Extraocular Movements: EOM normal.      Conjunctiva/sclera: Conjunctivae normal.      Right eye: No chemosis or exudate.     Left " eye: No chemosis or exudate.     Pupils: Pupils are equal, round, and reactive to light.   Neck:      Thyroid: No thyroid mass or thyromegaly.   Cardiovascular:      Rate and Rhythm: Normal rate and regular rhythm.      Heart sounds: Normal heart sounds, S1 normal and S2 normal. No murmur heard.  No friction rub. No gallop.    Pulmonary:      Effort: Pulmonary effort is normal. No accessory muscle usage or respiratory distress.      Breath sounds: Normal breath sounds. No decreased breath sounds, wheezing, rhonchi or rales.   Abdominal:      General: Bowel sounds are normal. There is no distension or ascites.      Palpations: Abdomen is soft. Abdomen is not rigid. There is no hepatosplenomegaly or mass.      Tenderness: There is no abdominal tenderness. There is no guarding or rebound.      Hernia: No hernia is present.   Musculoskeletal:      Right hand: Swelling, deformity, tenderness and bony tenderness present. No lacerations. Decreased range of motion. Decreased strength of finger abduction and thumb/finger opposition. Normal strength of wrist extension. Normal sensation. Normal sensation of the ulnar distribution and median distribution. There is no disruption of two-point discrimination. Normal capillary refill.      Cervical back: Neck supple. Bony tenderness present. No swelling, edema, deformity, erythema, signs of trauma, lacerations, rigidity, spasms, torticollis, tenderness or crepitus. Pain with movement present. Decreased range of motion.      Thoracic back: Bony tenderness present. No swelling, edema, deformity, signs of trauma, lacerations, spasms or tenderness. Decreased range of motion. No scoliosis.      Lumbar back: Bony tenderness present. No swelling, edema, deformity, signs of trauma, lacerations, spasms or tenderness. Decreased range of motion. No scoliosis.      Right hip: Tenderness and bony tenderness present. No deformity, lacerations or crepitus. Normal range of motion. Normal strength.       Left hip: Tenderness and bony tenderness present. No deformity, lacerations or crepitus. Normal range of motion. Normal strength.      Right foot: Decreased range of motion. Normal capillary refill. Swelling and tenderness present. No deformity, laceration, bony tenderness or crepitus.      Left foot: Decreased range of motion. Normal capillary refill. Swelling and tenderness present. No deformity, laceration, bony tenderness or crepitus.   Lymphadenopathy:      Head:      Right side of head: No submental, submandibular, preauricular or posterior auricular adenopathy.      Left side of head: No submental, submandibular, preauricular or posterior auricular adenopathy.      Cervical: No cervical adenopathy.   Skin:     General: Skin is warm, dry and intact.      Coloration: Skin is not pale.      Findings: No erythema or rash.      Comments: Covered in multiple tattoos over face, torso, and arms.   Neurological:      Mental Status: He is alert and oriented to person, place, and time.      Cranial Nerves: No cranial nerve deficit.      Sensory: No sensory deficit.      Motor: No tremor, atrophy, abnormal muscle tone or seizure activity.      Coordination: Coordination normal.      Gait: Gait normal.      Deep Tendon Reflexes: Strength normal.   Psychiatric:         Attention and Perception: He is attentive.         Mood and Affect: Mood and affect normal. Mood is not anxious or depressed. Affect is not labile, blunt, angry or inappropriate.         Speech: Speech normal.         Behavior: Behavior normal. Behavior is cooperative.         Thought Content: Thought content normal.         Cognition and Memory: Cognition and memory normal.         Judgment: Judgment normal.       Assessment & Plan    Discussion of plan of care including treatment options regarding health and wellness were reviewed and discussed with patient.  Any changes to medication or treatment plan, as well as any screening blood test, imaging, or  referrals to specialist, are documented.  Follow up as indicated.     1. HLA-B27 spondyloarthropathy  Stable; no therapeutic changes at this time.  The current medical regimen will be continued at this time as discussed.   - adalimumab (HUMIRA PEN) PnKt injection; Inject 1 pen (40 mg total) into the skin every 7 days.  Dispense: 12 pen; Refill: 2  - ketorolac injection 60 mg  - oxyCODONE-acetaminophen (PERCOCET)  mg per tablet; Take 1 tablet by mouth 3 (three) times daily as needed for Pain.  Dispense: 90 tablet; Refill: 0  - oxyCODONE-acetaminophen (PERCOCET)  mg per tablet; Take 1 tablet by mouth 3 (three) times daily as needed for Pain.  Dispense: 90 tablet; Refill: 0  - oxyCODONE-acetaminophen (PERCOCET)  mg per tablet; Take 1 tablet by mouth 3 (three) times daily as needed for Pain.  Dispense: 90 tablet; Refill: 0    2. Crohn's disease of large intestine without complication  The current medical regimen will be continued at this time as discussed.   - adalimumab (HUMIRA PEN) PnKt injection; Inject 1 pen (40 mg total) into the skin every 7 days.  Dispense: 12 pen; Refill: 2    3. Ankylosing spondylitis of multiple sites in spine  Discussed rules regarding chronic pain management with opiate/opioid therapy.  Discussed use of alternative medications to manage pain with goal of reducing and possibly discontinue opioid therapy, and advised that in order to continue current therapy they would need to schedule appointment at least once every 90 days, as well as consent to random urine drug screening.  The legitimate medical purpose of using a controlled substance for pain management is chronic pain not amenable to surgery.  Patient has been screened through the [facility] and is not receiving controlled substances from any other provider.  At this time, I have no suspicion of illegal activity and feel that with proper usage, there is low risk for overdose.     - adalimumab (HUMIRA PEN) PnKt  injection; Inject 1 pen (40 mg total) into the skin every 7 days.  Dispense: 12 pen; Refill: 2  - ketorolac injection 60 mg  - oxyCODONE-acetaminophen (PERCOCET)  mg per tablet; Take 1 tablet by mouth 3 (three) times daily as needed for Pain.  Dispense: 90 tablet; Refill: 0  - oxyCODONE-acetaminophen (PERCOCET)  mg per tablet; Take 1 tablet by mouth 3 (three) times daily as needed for Pain.  Dispense: 90 tablet; Refill: 0  - oxyCODONE-acetaminophen (PERCOCET)  mg per tablet; Take 1 tablet by mouth 3 (three) times daily as needed for Pain.  Dispense: 90 tablet; Refill: 0    4. Essential hypertension  Patient was counseled and encouraged to maintain a low sodium diet, as well as increasing physical activity.  Recommend random BP checks at home on a regular basis.  Repeat BP at end of visit was not necessary. Will continue medication at this time, and follow up in 3-6 months, or sooner if blood pressure begins to increase.     - amLODIPine (NORVASC) 5 MG tablet; Take 1 tablet (5 mg total) by mouth once daily.  Dispense: 30 tablet; Refill: 5    5. Gastroesophageal reflux disease without esophagitis  Patient was advised to continue medication and to decrease intake of caffeine and spicy foods and to elevate head while lying down.  Also advised to avoid lying down within 3 hours of last meal.  If symptoms continue with treatment, follow up for further evaluation.     - famotidine (PEPCID) 40 MG tablet; Take 1 tablet (40 mg total) by mouth every 12 (twelve) hours.  Dispense: 60 tablet; Refill: 5    6. Tinea pedis of both feet  The current medical regimen will be continued at this time as discussed. Medication prescribed for use only as symptoms require.   - nystatin (MYCOSTATIN) powder; Apply topically 2 (two) times daily.  Dispense: 120 g; Refill: 2    7. Generalized anxiety disorder  Stable; no therapeutic changes at this time.  The current medical regimen will be continued at this time as discussed.      8. Encounter for colorectal cancer screening  Patient is due for colonoscopy.  Order placed in [facility] and patient will be contacted to schedule appointment.  I will notify patient of results once available.    - Case Request Endoscopy: COLONOSCOPY       Follow up in about 3 months (around 4/4/2022).      ACTIVE MEDICAL ISSUES:  Documented in Problem List    PAST MEDICAL HISTORY  Documented    PAST SURGICAL HISTORY:  Documented    SOCIAL HISTORY:  Documented    FAMILY HISTORY:  Documented    ALLERGIES AND MEDICATIONS: updated and reviewed.  Documented    Health Maintenance       Date Due Completion Date    TETANUS VACCINE Never done ---    Sign Pain Contract Never done ---    Naloxone Prescription Never done ---    DEXA SCAN 04/12/2015 4/12/2013    Colorectal Cancer Screening Never done ---    Urine Drug Screen 09/08/2019 3/8/2019    Shingles Vaccine (1 of 2) Never done ---    COVID-19 Vaccine (3 - Booster for Moderna series) 10/29/2021 4/29/2021    Lipid Panel 08/19/2022 8/19/2021

## 2022-01-04 NOTE — PROGRESS NOTES
Health Maintenance Due   Topic     TETANUS VACCINE      Sign Pain Contract  Consult with pcp     Naloxone Prescription  Consult with pcp     DEXA SCAN  Consult with pcp     Colorectal Cancer Screening  Would like orders for main campus - pending orders     Urine Drug Screen  Consult with pcp     Shingles Vaccine (1 of 2) hx chickenpox ; inform pt can get vaccine at pharmacy.    COVID-19 Vaccine (3 - Booster for Moderna series) Schedule when ready

## 2022-01-04 NOTE — PROGRESS NOTES
Pt tolerated toradol injection without difficulty. No adverse reaction noted. Pt instructed to wait 15 minutes

## 2022-02-16 ENCOUNTER — PES CALL (OUTPATIENT)
Dept: ADMINISTRATIVE | Facility: CLINIC | Age: 52
End: 2022-02-16
Payer: MEDICAID

## 2022-03-07 DIAGNOSIS — E78.5 HYPERLIPIDEMIA, ACQUIRED: ICD-10-CM

## 2022-03-07 RX ORDER — PRAVASTATIN SODIUM 20 MG/1
20 TABLET ORAL NIGHTLY
Qty: 90 TABLET | Refills: 1 | Status: SHIPPED | OUTPATIENT
Start: 2022-03-07 | End: 2022-07-12 | Stop reason: SDUPTHER

## 2022-03-07 NOTE — TELEPHONE ENCOUNTER
No new care gaps identified.  Powered by [employer]. Reference number: [id number].   3/07/2022 11:44:14 AM CST

## 2022-03-07 NOTE — TELEPHONE ENCOUNTER
Last Office Visit Info:   The patient's last visit with Azikiwe K Lombard, MD was on 1/4/2022.    The patient's last visit in current department was on 1/4/2022.        Last CBC Results:   Lab Results   Component Value Date    WBC 5.82 08/19/2021    HGB 11.0 (L) 08/19/2021    HCT 34.0 (L) 08/19/2021     08/19/2021       Last CMP Results  Lab Results   Component Value Date     08/19/2021    K 4.0 08/19/2021     08/19/2021    CO2 24 08/19/2021    BUN 19 08/19/2021    CREATININE 1.2 08/19/2021    CALCIUM 8.6 (L) 08/19/2021    ALBUMIN 3.5 08/19/2021    AST 18 08/19/2021    ALT 17 08/19/2021       Last Lipids  Lab Results   Component Value Date    CHOL 212 (H) 08/19/2021    TRIG 161 (H) 08/19/2021    HDL 43 08/19/2021    LDLCALC 136.8 08/19/2021       Last A1C  Lab Results   Component Value Date    HGBA1C 5.3 08/19/2021       Last TSH  Lab Results   Component Value Date    TSH 1.155 08/19/2021             Current Med Refills  Medication List with Changes/Refills   Current Medications    ADALIMUMAB (HUMIRA PEN) PNKT INJECTION    Inject 1 pen (40 mg total) into the skin every 7 days.       Start Date: 1/4/2022  End Date: 3/29/2022    AMLODIPINE (NORVASC) 5 MG TABLET    Take 1 tablet (5 mg total) by mouth once daily.       Start Date: 1/4/2022  End Date: --    BACK BRACE MISC    1 each by Misc.(Non-Drug; Combo Route) route Daily.       Start Date: 5/12/2016 End Date: --    CHOLECALCIFEROL, VITAMIN D3, 125 MCG (5,000 UNIT) CAPSULE    Take 1 capsule (5,000 Units total) by mouth once daily.       Start Date: 11/7/2021 End Date: --    CLONAZEPAM (KLONOPIN) 2 MG TAB    Take 1 tablet (2 mg total) by mouth every evening.       Start Date: 11/7/2021 End Date: --    FAMOTIDINE (PEPCID) 40 MG TABLET    Take 1 tablet (40 mg total) by mouth every 12 (twelve) hours.       Start Date: 1/4/2022  End Date: --    METHYLPREDNISOLONE (MEDROL DOSEPACK) 4 MG TABLET    use as directed       Start Date: 10/15/2021End Date: --     NYSTATIN (MYCOSTATIN) POWDER    Apply topically 2 (two) times daily.       Start Date: 1/4/2022  End Date: --    OXYCODONE-ACETAMINOPHEN (PERCOCET)  MG PER TABLET    Take 1 tablet by mouth 3 (three) times daily as needed for Pain.       Start Date: 2/8/2022  End Date: 3/10/2022    OXYCODONE-ACETAMINOPHEN (PERCOCET)  MG PER TABLET    Take 1 tablet by mouth 3 (three) times daily as needed for Pain.       Start Date: 3/10/2022 End Date: 4/9/2022    PRAVASTATIN (PRAVACHOL) 20 MG TABLET    Take 1 tablet (20 mg total) by mouth nightly.       Start Date: 11/7/2021 End Date: --       Order(s) placed per written order guidelines:     Please advise.

## 2022-04-04 DIAGNOSIS — K21.9 GASTROESOPHAGEAL REFLUX DISEASE WITHOUT ESOPHAGITIS: ICD-10-CM

## 2022-04-04 NOTE — TELEPHONE ENCOUNTER
Last Office Visit Info:   The patient's last visit with Azikiwe K Lombard, MD was on 1/4/2022.    The patient's last visit in current department was on 1/4/2022.        Last CBC Results:   Lab Results   Component Value Date    WBC 5.82 08/19/2021    HGB 11.0 (L) 08/19/2021    HCT 34.0 (L) 08/19/2021     08/19/2021       Last CMP Results  Lab Results   Component Value Date     08/19/2021    K 4.0 08/19/2021     08/19/2021    CO2 24 08/19/2021    BUN 19 08/19/2021    CREATININE 1.2 08/19/2021    CALCIUM 8.6 (L) 08/19/2021    ALBUMIN 3.5 08/19/2021    AST 18 08/19/2021    ALT 17 08/19/2021       Last Lipids  Lab Results   Component Value Date    CHOL 212 (H) 08/19/2021    TRIG 161 (H) 08/19/2021    HDL 43 08/19/2021    LDLCALC 136.8 08/19/2021       Last A1C  Lab Results   Component Value Date    HGBA1C 5.3 08/19/2021       Last TSH  Lab Results   Component Value Date    TSH 1.155 08/19/2021             Current Med Refills  Medication List with Changes/Refills   Current Medications    ADALIMUMAB (HUMIRA PEN) PNKT INJECTION    Inject 1 pen (40 mg total) into the skin every 7 days.       Start Date: 1/4/2022  End Date: 3/29/2022    AMLODIPINE (NORVASC) 5 MG TABLET    Take 1 tablet (5 mg total) by mouth once daily.       Start Date: 1/4/2022  End Date: --    BACK BRACE MISC    1 each by Misc.(Non-Drug; Combo Route) route Daily.       Start Date: 5/12/2016 End Date: --    CHOLECALCIFEROL, VITAMIN D3, 125 MCG (5,000 UNIT) CAPSULE    Take 1 capsule (5,000 Units total) by mouth once daily.       Start Date: 11/7/2021 End Date: --    CLONAZEPAM (KLONOPIN) 2 MG TAB    Take 1 tablet (2 mg total) by mouth every evening.       Start Date: 11/7/2021 End Date: --    FAMOTIDINE (PEPCID) 40 MG TABLET    Take 1 tablet (40 mg total) by mouth every 12 (twelve) hours.       Start Date: 1/4/2022  End Date: --    METHYLPREDNISOLONE (MEDROL DOSEPACK) 4 MG TABLET    use as directed       Start Date: 10/15/2021End Date: --     NYSTATIN (MYCOSTATIN) POWDER    Apply topically 2 (two) times daily.       Start Date: 1/4/2022  End Date: --    OXYCODONE-ACETAMINOPHEN (PERCOCET)  MG PER TABLET    Take 1 tablet by mouth 3 (three) times daily as needed for Pain.       Start Date: 3/10/2022 End Date: 4/9/2022    PRAVASTATIN (PRAVACHOL) 20 MG TABLET    Take 1 tablet (20 mg total) by mouth nightly.       Start Date: 3/7/2022  End Date: --       Order(s) placed per written order guidelines:     Please advise.

## 2022-04-04 NOTE — TELEPHONE ENCOUNTER
No new care gaps identified.  Powered by [employer]. Reference number: [id number].   4/04/2022 12:45:52 PM CDT

## 2022-04-06 RX ORDER — FAMOTIDINE 40 MG/1
40 TABLET, FILM COATED ORAL EVERY 12 HOURS
Qty: 60 TABLET | Refills: 5 | Status: SHIPPED | OUTPATIENT
Start: 2022-04-06 | End: 2022-07-12 | Stop reason: SDUPTHER

## 2022-04-12 ENCOUNTER — OFFICE VISIT (OUTPATIENT)
Dept: FAMILY MEDICINE | Facility: CLINIC | Age: 52
End: 2022-04-12
Payer: MEDICARE

## 2022-04-12 VITALS
OXYGEN SATURATION: 98 % | RESPIRATION RATE: 16 BRPM | BODY MASS INDEX: 29.49 KG/M2 | TEMPERATURE: 99 F | WEIGHT: 177 LBS | HEIGHT: 65 IN | DIASTOLIC BLOOD PRESSURE: 86 MMHG | SYSTOLIC BLOOD PRESSURE: 134 MMHG | HEART RATE: 72 BPM

## 2022-04-12 DIAGNOSIS — M45.0 ANKYLOSING SPONDYLITIS OF MULTIPLE SITES IN SPINE: ICD-10-CM

## 2022-04-12 DIAGNOSIS — M47.899 HLA-B27 SPONDYLOARTHROPATHY: ICD-10-CM

## 2022-04-12 DIAGNOSIS — I10 ESSENTIAL HYPERTENSION: ICD-10-CM

## 2022-04-12 DIAGNOSIS — K50.10 CROHN'S DISEASE OF LARGE INTESTINE WITHOUT COMPLICATION: Primary | ICD-10-CM

## 2022-04-12 DIAGNOSIS — Z12.11 SCREEN FOR COLON CANCER: ICD-10-CM

## 2022-04-12 PROCEDURE — 3079F DIAST BP 80-89 MM HG: CPT | Mod: CPTII,S$GLB,, | Performed by: FAMILY MEDICINE

## 2022-04-12 PROCEDURE — 99999 PR PBB SHADOW E&M-EST. PATIENT-LVL III: CPT | Mod: PBBFAC,,, | Performed by: FAMILY MEDICINE

## 2022-04-12 PROCEDURE — 99999 PR PBB SHADOW E&M-EST. PATIENT-LVL III: ICD-10-PCS | Mod: PBBFAC,,, | Performed by: FAMILY MEDICINE

## 2022-04-12 PROCEDURE — 3075F SYST BP GE 130 - 139MM HG: CPT | Mod: CPTII,S$GLB,, | Performed by: FAMILY MEDICINE

## 2022-04-12 PROCEDURE — 99215 OFFICE O/P EST HI 40 MIN: CPT | Mod: S$GLB,,, | Performed by: FAMILY MEDICINE

## 2022-04-12 PROCEDURE — 3075F PR MOST RECENT SYSTOLIC BLOOD PRESS GE 130-139MM HG: ICD-10-PCS | Mod: CPTII,S$GLB,, | Performed by: FAMILY MEDICINE

## 2022-04-12 PROCEDURE — 1159F PR MEDICATION LIST DOCUMENTED IN MEDICAL RECORD: ICD-10-PCS | Mod: CPTII,S$GLB,, | Performed by: FAMILY MEDICINE

## 2022-04-12 PROCEDURE — 99215 PR OFFICE/OUTPT VISIT, EST, LEVL V, 40-54 MIN: ICD-10-PCS | Mod: S$GLB,,, | Performed by: FAMILY MEDICINE

## 2022-04-12 PROCEDURE — 3008F PR BODY MASS INDEX (BMI) DOCUMENTED: ICD-10-PCS | Mod: CPTII,S$GLB,, | Performed by: FAMILY MEDICINE

## 2022-04-12 PROCEDURE — 1160F PR REVIEW ALL MEDS BY PRESCRIBER/CLIN PHARMACIST DOCUMENTED: ICD-10-PCS | Mod: CPTII,S$GLB,, | Performed by: FAMILY MEDICINE

## 2022-04-12 PROCEDURE — 3079F PR MOST RECENT DIASTOLIC BLOOD PRESSURE 80-89 MM HG: ICD-10-PCS | Mod: CPTII,S$GLB,, | Performed by: FAMILY MEDICINE

## 2022-04-12 PROCEDURE — 3008F BODY MASS INDEX DOCD: CPT | Mod: CPTII,S$GLB,, | Performed by: FAMILY MEDICINE

## 2022-04-12 PROCEDURE — 1160F RVW MEDS BY RX/DR IN RCRD: CPT | Mod: CPTII,S$GLB,, | Performed by: FAMILY MEDICINE

## 2022-04-12 PROCEDURE — 1159F MED LIST DOCD IN RCRD: CPT | Mod: CPTII,S$GLB,, | Performed by: FAMILY MEDICINE

## 2022-04-12 RX ORDER — OXYCODONE AND ACETAMINOPHEN 10; 325 MG/1; MG/1
1 TABLET ORAL 3 TIMES DAILY PRN
Qty: 90 TABLET | Refills: 0 | Status: SHIPPED | OUTPATIENT
Start: 2022-05-10 | End: 2022-06-09

## 2022-04-12 RX ORDER — OXYCODONE AND ACETAMINOPHEN 10; 325 MG/1; MG/1
1 TABLET ORAL 3 TIMES DAILY PRN
Qty: 90 TABLET | Refills: 0 | Status: SHIPPED | OUTPATIENT
Start: 2022-04-12 | End: 2022-05-12

## 2022-04-12 RX ORDER — AMLODIPINE BESYLATE 5 MG/1
5 TABLET ORAL DAILY
Qty: 30 TABLET | Refills: 5 | Status: SHIPPED | OUTPATIENT
Start: 2022-04-12 | End: 2022-07-12 | Stop reason: SDUPTHER

## 2022-04-12 RX ORDER — OXYCODONE AND ACETAMINOPHEN 10; 325 MG/1; MG/1
1 TABLET ORAL 3 TIMES DAILY PRN
Qty: 90 TABLET | Refills: 0 | Status: SHIPPED | OUTPATIENT
Start: 2022-06-07 | End: 2022-07-12 | Stop reason: SDUPTHER

## 2022-04-12 RX ORDER — ADALIMUMAB 40MG/0.8ML
40 KIT SUBCUTANEOUS
Qty: 12 PEN | Refills: 2 | Status: SHIPPED | OUTPATIENT
Start: 2022-04-12 | End: 2022-07-11 | Stop reason: SDUPTHER

## 2022-04-12 NOTE — PROGRESS NOTES
Chief Complaint   Patient presents with    HLA-B27 spondyloarthropathy     Follow up        Beau Ferraro is a 51 y.o. male who presents per chief complain.  Chronic medical issues, if present, have been documented.  Acute medical issues, if present have been documented in the Chief Complaint.     HPI    ROS  Review of Systems   Constitutional: Negative.  Negative for activity change, appetite change, chills, diaphoresis, fatigue, fever and unexpected weight change.   HENT: Negative.  Negative for congestion, ear pain, hearing loss, nosebleeds, postnasal drip, rhinorrhea, sinus pressure, sneezing, sore throat and trouble swallowing.    Eyes: Negative for pain and visual disturbance.   Respiratory: Negative for apnea, cough, choking, chest tightness and shortness of breath.    Cardiovascular: Negative for chest pain, palpitations and leg swelling.   Gastrointestinal: Negative for abdominal distention, abdominal pain, anal bleeding, blood in stool, constipation, diarrhea, nausea, rectal pain and vomiting.   Genitourinary: Negative for difficulty urinating, dysuria, flank pain, frequency, penile pain, penile swelling, scrotal swelling, testicular pain and urgency.   Musculoskeletal: Positive for arthralgias (right hand; most joints), back pain, gait problem, myalgias, neck pain (improving) and neck stiffness. Negative for joint swelling.   Skin: Positive for rash (on face and behind ears). Negative for color change, pallor and wound.   Allergic/Immunologic: Negative for environmental allergies, food allergies and immunocompromised state.   Neurological: Negative for dizziness, seizures, syncope, weakness, light-headedness, numbness and headaches.   Psychiatric/Behavioral: Positive for dysphoric mood. Negative for confusion, decreased concentration, hallucinations and sleep disturbance. The patient is nervous/anxious. The patient is not hyperactive.      Physical Exam  Vitals:    04/12/22 0900   BP: 134/86   Pulse:  "72   Resp: 16   Temp: 99.2 °F (37.3 °C)    Body mass index is 29.46 kg/m².  Weight: 80.3 kg (177 lb 0.5 oz)   Height: 5' 5" (165.1 cm)     Physical Exam  Vitals reviewed.   Constitutional:       General: He is not in acute distress.     Appearance: Normal appearance. He is well-developed. He is not ill-appearing, toxic-appearing or diaphoretic.   HENT:      Head: Normocephalic and atraumatic.      Right Ear: Hearing, tympanic membrane, ear canal and external ear normal. No decreased hearing noted. No tenderness. No foreign body. No mastoid tenderness. No hemotympanum. Tympanic membrane is not injected, scarred, perforated, erythematous, retracted or bulging.      Left Ear: Hearing, tympanic membrane, ear canal and external ear normal. No decreased hearing noted. No tenderness. No foreign body. No mastoid tenderness. No hemotympanum. Tympanic membrane is not injected, scarred, perforated, erythematous, retracted or bulging.      Nose: Nose normal. No nasal deformity, septal deviation or rhinorrhea.      Mouth/Throat:      Dentition: Normal dentition. Does not have dentures. No dental caries.      Pharynx: Uvula midline. No oropharyngeal exudate, posterior oropharyngeal erythema or uvula swelling.      Tonsils: No tonsillar abscesses.   Eyes:      General: Lids are normal. No scleral icterus.        Right eye: No foreign body, discharge or hordeolum.         Left eye: No foreign body, discharge or hordeolum.      Conjunctiva/sclera: Conjunctivae normal.      Right eye: No chemosis or exudate.     Left eye: No chemosis or exudate.     Pupils: Pupils are equal, round, and reactive to light.   Neck:      Thyroid: No thyroid mass or thyromegaly.   Cardiovascular:      Rate and Rhythm: Normal rate and regular rhythm.      Heart sounds: Normal heart sounds, S1 normal and S2 normal. No murmur heard.    No friction rub. No gallop.   Pulmonary:      Effort: Pulmonary effort is normal. No accessory muscle usage or respiratory " distress.      Breath sounds: Normal breath sounds. No decreased breath sounds, wheezing, rhonchi or rales.   Abdominal:      General: Bowel sounds are normal. There is no distension.      Palpations: Abdomen is soft. Abdomen is not rigid. There is no mass.      Tenderness: There is no abdominal tenderness. There is no guarding or rebound.      Hernia: No hernia is present.   Musculoskeletal:      Right hand: Swelling, deformity, tenderness and bony tenderness present. No lacerations. Decreased range of motion. Decreased strength of finger abduction and thumb/finger opposition. Normal strength of wrist extension. Normal sensation. Normal sensation of the ulnar distribution and median distribution. There is no disruption of two-point discrimination. Normal capillary refill.      Cervical back: Neck supple. Bony tenderness present. No swelling, edema, deformity, erythema, signs of trauma, lacerations, rigidity, spasms, torticollis, tenderness or crepitus. Pain with movement present. Decreased range of motion.      Thoracic back: Bony tenderness present. No swelling, edema, deformity, signs of trauma, lacerations, spasms or tenderness. Decreased range of motion. No scoliosis.      Lumbar back: Bony tenderness present. No swelling, edema, deformity, signs of trauma, lacerations, spasms or tenderness. Decreased range of motion. No scoliosis.      Right hip: Tenderness and bony tenderness present. No deformity, lacerations or crepitus. Normal range of motion. Normal strength.      Left hip: Tenderness and bony tenderness present. No deformity, lacerations or crepitus. Normal range of motion. Normal strength.      Right foot: Decreased range of motion. Normal capillary refill. Swelling and tenderness present. No deformity, laceration, bony tenderness or crepitus.      Left foot: Decreased range of motion. Normal capillary refill. Swelling and tenderness present. No deformity, laceration, bony tenderness or crepitus.    Lymphadenopathy:      Head:      Right side of head: No submental, submandibular, preauricular or posterior auricular adenopathy.      Left side of head: No submental, submandibular, preauricular or posterior auricular adenopathy.      Cervical: No cervical adenopathy.   Skin:     General: Skin is warm and dry.      Coloration: Skin is not pale.      Findings: No erythema or rash.      Comments: Covered in multiple tattoos over face, torso, and arms.   Neurological:      Mental Status: He is alert and oriented to person, place, and time.      Cranial Nerves: No cranial nerve deficit.      Sensory: No sensory deficit.      Motor: No tremor, atrophy, abnormal muscle tone or seizure activity.      Coordination: Coordination normal.      Gait: Gait normal.   Psychiatric:         Attention and Perception: He is attentive.         Mood and Affect: Mood is not anxious or depressed. Affect is not labile, blunt, angry or inappropriate.         Speech: Speech normal.         Behavior: Behavior normal. Behavior is cooperative.         Thought Content: Thought content normal.         Judgment: Judgment normal.       Assessment & Plan    Discussion of plan of care including treatment options regarding health and wellness were reviewed and discussed with patient.  Any changes to medication or treatment plan, as well as any screening blood test, imaging, or referrals to specialist, are documented.  Follow up as indicated.     1. Crohn's disease of large intestine without complication  Chronic condition; will continue to document and monitor.  Stable; no therapeutic changes at this time.  Screening test will be ordered and once results available patient will be notified of results and managed accordingly.   - adalimumab (HUMIRA PEN) PnKt injection; Inject 1 pen (40 mg total) into the skin every 7 days.  Dispense: 12 pen; Refill: 2  - Case Request Endoscopy: COLONOSCOPY    2. Ankylosing spondylitis of multiple sites in  spine  Discussed rules regarding chronic pain management with opiate/opioid therapy.  Discussed use of alternative medications to manage pain with goal of reducing and possibly discontinue opioid therapy, and advised that in order to continue current therapy they would need to schedule appointment at least once every 90 days, as well as consent to random urine drug screening.  The legitimate medical purpose of using a controlled substance for pain management is chronic pain not amenable to surgery.  Patient has been screened through the [facility] and is not receiving controlled substances from any other provider.  At this time, I have no suspicion of illegal activity and feel that with proper usage, there is low risk for overdose.     - adalimumab (HUMIRA PEN) PnKt injection; Inject 1 pen (40 mg total) into the skin every 7 days.  Dispense: 12 pen; Refill: 2  - oxyCODONE-acetaminophen (PERCOCET)  mg per tablet; Take 1 tablet by mouth 3 (three) times daily as needed for Pain.  Dispense: 90 tablet; Refill: 0  - oxyCODONE-acetaminophen (PERCOCET)  mg per tablet; Take 1 tablet by mouth 3 (three) times daily as needed for Pain.  Dispense: 90 tablet; Refill: 0  - oxyCODONE-acetaminophen (PERCOCET)  mg per tablet; Take 1 tablet by mouth 3 (three) times daily as needed for Pain.  Dispense: 90 tablet; Refill: 0    3. HLA-B27 spondyloarthropathy  The current medical regimen will be continued at this time as discussed.   - adalimumab (HUMIRA PEN) PnKt injection; Inject 1 pen (40 mg total) into the skin every 7 days.  Dispense: 12 pen; Refill: 2  - oxyCODONE-acetaminophen (PERCOCET)  mg per tablet; Take 1 tablet by mouth 3 (three) times daily as needed for Pain.  Dispense: 90 tablet; Refill: 0  - oxyCODONE-acetaminophen (PERCOCET)  mg per tablet; Take 1 tablet by mouth 3 (three) times daily as needed for Pain.  Dispense: 90 tablet; Refill: 0  - oxyCODONE-acetaminophen (PERCOCET)  mg per  tablet; Take 1 tablet by mouth 3 (three) times daily as needed for Pain.  Dispense: 90 tablet; Refill: 0    4. Essential hypertension  Patient was counseled and encouraged to maintain a low sodium diet, as well as increasing physical activity.  Recommend random BP checks at home on a regular basis.  Repeat BP at end of visit was not necessary. Will continue medication at this time, and follow up in 3-6 months, or sooner if blood pressure begins to increase.     - amLODIPine (NORVASC) 5 MG tablet; Take 1 tablet (5 mg total) by mouth once daily.  Dispense: 30 tablet; Refill: 5    5. Screen for colon cancer  Patient is due for colonoscopy.  Order placed in [id number] and patient will be contacted to schedule appointment.  I will notify patient of results once available.    - Case Request Endoscopy: COLONOSCOPY       Follow up in about 3 months (around 7/12/2022).      ACTIVE MEDICAL ISSUES:  Documented in Problem List    PAST MEDICAL HISTORY  Documented    PAST SURGICAL HISTORY:  Documented    SOCIAL HISTORY:  Documented    FAMILY HISTORY:  Documented    ALLERGIES AND MEDICATIONS: updated and reviewed.  Documented    Health Maintenance       Date Due Completion Date    TETANUS VACCINE Never done ---    Sign Pain Contract Never done ---    DEXA Scan 04/12/2015 4/12/2013    Colorectal Cancer Screening Never done ---    Shingles Vaccine (1 of 2) Never done ---    Lipid Panel 08/19/2022 8/19/2021

## 2022-04-12 NOTE — PROGRESS NOTES
Health Maintenance Due   Topic     TETANUS VACCINE      Sign Pain Contract  Consult with pcp     DEXA Scan  Consult with pcp     Colorectal Cancer Screening  Consult with pcp     Shingles Vaccine (1 of 2) hx chickenpox ; inform pt can get vaccine at pharmacy.

## 2022-04-18 ENCOUNTER — PATIENT OUTREACH (OUTPATIENT)
Dept: ADMINISTRATIVE | Facility: OTHER | Age: 52
End: 2022-04-18
Payer: MEDICARE

## 2022-04-18 NOTE — PROGRESS NOTES
Health Maintenance Due   Topic Date Due    TETANUS VACCINE  Never done    Sign Pain Contract  Never done    Naloxone Prescription  Never done    DEXA Scan  04/12/2015    Colorectal Cancer Screening  Never done    Urine Drug Screen  09/08/2019    Shingles Vaccine (1 of 2) Never done     Updates were requested from care everywhere.  Chart was reviewed for overdue Proactive [facility] Encounters (SANDRA) topics (CRS, Breast Cancer Screening, Eye exam)  Health Maintenance has been updated.  LINKS immunization registry triggered.  Immunizations were reconciled.

## 2022-06-10 DIAGNOSIS — M47.899 HLA-B27 SPONDYLOARTHROPATHY: ICD-10-CM

## 2022-06-10 DIAGNOSIS — K50.10 CROHN'S DISEASE OF LARGE INTESTINE WITHOUT COMPLICATION: ICD-10-CM

## 2022-06-10 DIAGNOSIS — M06.4 INFLAMMATORY POLYARTHRITIS: ICD-10-CM

## 2022-06-10 DIAGNOSIS — B35.3 TINEA PEDIS OF BOTH FEET: ICD-10-CM

## 2022-06-10 RX ORDER — METHYLPREDNISOLONE 4 MG/1
TABLET ORAL
OUTPATIENT
Start: 2022-06-10

## 2022-06-10 NOTE — TELEPHONE ENCOUNTER
Last Office Visit Info:   The patient's last visit with Azikiwe K Lombard, MD was on 4/12/2022.    The patient's last visit in current department was on 4/12/2022.        Last CBC Results:   Lab Results   Component Value Date    WBC 5.82 08/19/2021    HGB 11.0 (L) 08/19/2021    HCT 34.0 (L) 08/19/2021     08/19/2021       Last CMP Results  Lab Results   Component Value Date     08/19/2021    K 4.0 08/19/2021     08/19/2021    CO2 24 08/19/2021    BUN 19 08/19/2021    CREATININE 1.2 08/19/2021    CALCIUM 8.6 (L) 08/19/2021    ALBUMIN 3.5 08/19/2021    AST 18 08/19/2021    ALT 17 08/19/2021       Last Lipids  Lab Results   Component Value Date    CHOL 212 (H) 08/19/2021    TRIG 161 (H) 08/19/2021    HDL 43 08/19/2021    LDLCALC 136.8 08/19/2021       Last A1C  Lab Results   Component Value Date    HGBA1C 5.3 08/19/2021       Last TSH  Lab Results   Component Value Date    TSH 1.155 08/19/2021             Current Med Refills  Medication List with Changes/Refills   Current Medications    ADALIMUMAB (HUMIRA PEN) PNKT INJECTION    Inject 1 pen (40 mg total) into the skin every 7 days.       Start Date: 4/12/2022 End Date: --    AMLODIPINE (NORVASC) 5 MG TABLET    Take 1 tablet (5 mg total) by mouth once daily.       Start Date: 4/12/2022 End Date: --    BACK BRACE MISC    1 each by Misc.(Non-Drug; Combo Route) route Daily.       Start Date: 5/12/2016 End Date: --    CHOLECALCIFEROL, VITAMIN D3, 125 MCG (5,000 UNIT) CAPSULE    Take 1 capsule (5,000 Units total) by mouth once daily.       Start Date: 11/7/2021 End Date: --    CLONAZEPAM (KLONOPIN) 2 MG TAB    Take 1 tablet (2 mg total) by mouth every evening.       Start Date: 11/7/2021 End Date: --    FAMOTIDINE (PEPCID) 40 MG TABLET    Take 1 tablet (40 mg total) by mouth every 12 (twelve) hours.       Start Date: 4/6/2022  End Date: --    NYSTATIN (MYCOSTATIN) POWDER    Apply topically 2 (two) times daily.       Start Date: 1/4/2022  End Date: --     OXYCODONE-ACETAMINOPHEN (PERCOCET)  MG PER TABLET    Take 1 tablet by mouth 3 (three) times daily as needed for Pain.       Start Date: 6/7/2022  End Date: 7/7/2022    PRAVASTATIN (PRAVACHOL) 20 MG TABLET    Take 1 tablet (20 mg total) by mouth nightly.       Start Date: 3/7/2022  End Date: --       Order(s) placed per written order guidelines:     Please advise.

## 2022-06-10 NOTE — TELEPHONE ENCOUNTER
No new care gaps identified.  [facility] Embedded Care Gaps. Reference number: [id number]. 6/10/2022   3:00:55 PM CDT

## 2022-06-24 RX ORDER — ERGOCALCIFEROL 1.25 MG/1
50000 CAPSULE ORAL
OUTPATIENT
Start: 2022-06-24

## 2022-06-24 NOTE — TELEPHONE ENCOUNTER
----- Message from Kwame Ratliff sent at 6/24/2022  9:39 AM CDT -----  Regarding: Pharmacy  Contact: [facility]  Type:  Pharmacy Calling to Clarify an RX    Name of Caller: Bre    Pharmacy Name:   [facility] - [place], [place] - [address]  [address]  [place] [address]  Phone: [phone] Fax: [phone]    Prescription Name: cholecalciferol, vitamin D3, 125 mcg (5,000 unit) capsule    What do they need to clarify? Please change script to vitamin D 50,000 once a week.    Can you be contacted via [facility]? No    Best Call Back Number: [phone]    Additional Information:

## 2022-06-24 NOTE — TELEPHONE ENCOUNTER
Bre/[facility] requesting to have script changed.  Please advise.

## 2022-07-11 DIAGNOSIS — M47.899 HLA-B27 SPONDYLOARTHROPATHY: ICD-10-CM

## 2022-07-11 DIAGNOSIS — K50.10 CROHN'S DISEASE OF LARGE INTESTINE WITHOUT COMPLICATION: ICD-10-CM

## 2022-07-11 DIAGNOSIS — M45.0 ANKYLOSING SPONDYLITIS OF MULTIPLE SITES IN SPINE: ICD-10-CM

## 2022-07-11 NOTE — TELEPHONE ENCOUNTER
Last Office Visit Info:   The patient's last visit with Azikiwe K Lombard, MD was on 4/12/2022.    The patient's last visit in current department was on 4/12/2022.        Last CBC Results:   Lab Results   Component Value Date    WBC 5.82 08/19/2021    HGB 11.0 (L) 08/19/2021    HCT 34.0 (L) 08/19/2021     08/19/2021       Last CMP Results  Lab Results   Component Value Date     08/19/2021    K 4.0 08/19/2021     08/19/2021    CO2 24 08/19/2021    BUN 19 08/19/2021    CREATININE 1.2 08/19/2021    CALCIUM 8.6 (L) 08/19/2021    ALBUMIN 3.5 08/19/2021    AST 18 08/19/2021    ALT 17 08/19/2021       Last Lipids  Lab Results   Component Value Date    CHOL 212 (H) 08/19/2021    TRIG 161 (H) 08/19/2021    HDL 43 08/19/2021    LDLCALC 136.8 08/19/2021       Last A1C  Lab Results   Component Value Date    HGBA1C 5.3 08/19/2021       Last TSH  Lab Results   Component Value Date    TSH 1.155 08/19/2021             Current Med Refills  Medication List with Changes/Refills   Current Medications    ADALIMUMAB (HUMIRA PEN) PNKT INJECTION    Inject 1 pen (40 mg total) into the skin every 7 days.       Start Date: 4/12/2022 End Date: --    AMLODIPINE (NORVASC) 5 MG TABLET    Take 1 tablet (5 mg total) by mouth once daily.       Start Date: 4/12/2022 End Date: --    BACK BRACE MISC    1 each by Misc.(Non-Drug; Combo Route) route Daily.       Start Date: 5/12/2016 End Date: --    CHOLECALCIFEROL, VITAMIN D3, 125 MCG (5,000 UNIT) CAPSULE    Take 1 capsule (5,000 Units total) by mouth once daily.       Start Date: 11/7/2021 End Date: --    CLONAZEPAM (KLONOPIN) 2 MG TAB    Take 1 tablet (2 mg total) by mouth every evening.       Start Date: 11/7/2021 End Date: --    FAMOTIDINE (PEPCID) 40 MG TABLET    Take 1 tablet (40 mg total) by mouth every 12 (twelve) hours.       Start Date: 4/6/2022  End Date: --    NYSTATIN (MYCOSTATIN) POWDER    Apply topically 2 (two) times daily.       Start Date: 1/4/2022  End Date: --     PRAVASTATIN (PRAVACHOL) 20 MG TABLET    Take 1 tablet (20 mg total) by mouth nightly.       Start Date: 3/7/2022  End Date: --       Order(s) placed per written order guidelines:     Please advise.

## 2022-07-12 ENCOUNTER — OFFICE VISIT (OUTPATIENT)
Dept: FAMILY MEDICINE | Facility: CLINIC | Age: 52
End: 2022-07-12
Payer: MEDICARE

## 2022-07-12 VITALS
DIASTOLIC BLOOD PRESSURE: 86 MMHG | BODY MASS INDEX: 28.69 KG/M2 | OXYGEN SATURATION: 99 % | HEIGHT: 65 IN | TEMPERATURE: 99 F | WEIGHT: 172.19 LBS | SYSTOLIC BLOOD PRESSURE: 116 MMHG | RESPIRATION RATE: 16 BRPM | HEART RATE: 77 BPM

## 2022-07-12 DIAGNOSIS — M47.812 CERVICAL SPONDYLOSIS WITHOUT MYELOPATHY: ICD-10-CM

## 2022-07-12 DIAGNOSIS — E78.5 HYPERLIPIDEMIA, ACQUIRED: ICD-10-CM

## 2022-07-12 DIAGNOSIS — I10 ESSENTIAL HYPERTENSION: ICD-10-CM

## 2022-07-12 DIAGNOSIS — M41.9 KYPHOSCOLIOSIS: ICD-10-CM

## 2022-07-12 DIAGNOSIS — K50.10 CROHN'S DISEASE OF LARGE INTESTINE WITHOUT COMPLICATION: ICD-10-CM

## 2022-07-12 DIAGNOSIS — M45.0 ANKYLOSING SPONDYLITIS OF MULTIPLE SITES IN SPINE: Primary | ICD-10-CM

## 2022-07-12 DIAGNOSIS — K21.9 GASTROESOPHAGEAL REFLUX DISEASE WITHOUT ESOPHAGITIS: ICD-10-CM

## 2022-07-12 DIAGNOSIS — M47.899 HLA-B27 SPONDYLOARTHROPATHY: ICD-10-CM

## 2022-07-12 PROCEDURE — 99999 PR PBB SHADOW E&M-EST. PATIENT-LVL IV: CPT | Mod: PBBFAC,,, | Performed by: FAMILY MEDICINE

## 2022-07-12 PROCEDURE — 3008F PR BODY MASS INDEX (BMI) DOCUMENTED: ICD-10-PCS | Mod: CPTII,S$GLB,, | Performed by: FAMILY MEDICINE

## 2022-07-12 PROCEDURE — 3074F SYST BP LT 130 MM HG: CPT | Mod: CPTII,S$GLB,, | Performed by: FAMILY MEDICINE

## 2022-07-12 PROCEDURE — 99215 OFFICE O/P EST HI 40 MIN: CPT | Mod: S$GLB,,, | Performed by: FAMILY MEDICINE

## 2022-07-12 PROCEDURE — 1159F PR MEDICATION LIST DOCUMENTED IN MEDICAL RECORD: ICD-10-PCS | Mod: CPTII,S$GLB,, | Performed by: FAMILY MEDICINE

## 2022-07-12 PROCEDURE — 1160F PR REVIEW ALL MEDS BY PRESCRIBER/CLIN PHARMACIST DOCUMENTED: ICD-10-PCS | Mod: CPTII,S$GLB,, | Performed by: FAMILY MEDICINE

## 2022-07-12 PROCEDURE — 3079F DIAST BP 80-89 MM HG: CPT | Mod: CPTII,S$GLB,, | Performed by: FAMILY MEDICINE

## 2022-07-12 PROCEDURE — 3079F PR MOST RECENT DIASTOLIC BLOOD PRESSURE 80-89 MM HG: ICD-10-PCS | Mod: CPTII,S$GLB,, | Performed by: FAMILY MEDICINE

## 2022-07-12 PROCEDURE — 99215 PR OFFICE/OUTPT VISIT, EST, LEVL V, 40-54 MIN: ICD-10-PCS | Mod: S$GLB,,, | Performed by: FAMILY MEDICINE

## 2022-07-12 PROCEDURE — 3008F BODY MASS INDEX DOCD: CPT | Mod: CPTII,S$GLB,, | Performed by: FAMILY MEDICINE

## 2022-07-12 PROCEDURE — 99999 PR PBB SHADOW E&M-EST. PATIENT-LVL IV: ICD-10-PCS | Mod: PBBFAC,,, | Performed by: FAMILY MEDICINE

## 2022-07-12 PROCEDURE — 1159F MED LIST DOCD IN RCRD: CPT | Mod: CPTII,S$GLB,, | Performed by: FAMILY MEDICINE

## 2022-07-12 PROCEDURE — 1160F RVW MEDS BY RX/DR IN RCRD: CPT | Mod: CPTII,S$GLB,, | Performed by: FAMILY MEDICINE

## 2022-07-12 PROCEDURE — 3074F PR MOST RECENT SYSTOLIC BLOOD PRESSURE < 130 MM HG: ICD-10-PCS | Mod: CPTII,S$GLB,, | Performed by: FAMILY MEDICINE

## 2022-07-12 RX ORDER — OXYCODONE AND ACETAMINOPHEN 10; 325 MG/1; MG/1
1 TABLET ORAL 3 TIMES DAILY PRN
Qty: 90 TABLET | Refills: 0 | Status: SHIPPED | OUTPATIENT
Start: 2023-08-05 | End: 2022-07-12

## 2022-07-12 RX ORDER — FAMOTIDINE 40 MG/1
40 TABLET, FILM COATED ORAL EVERY 12 HOURS
Qty: 180 TABLET | Refills: 1 | Status: SHIPPED | OUTPATIENT
Start: 2022-07-12 | End: 2023-02-03 | Stop reason: SDUPTHER

## 2022-07-12 RX ORDER — OXYCODONE AND ACETAMINOPHEN 10; 325 MG/1; MG/1
1 TABLET ORAL 3 TIMES DAILY PRN
Qty: 90 TABLET | Refills: 0 | Status: SHIPPED | OUTPATIENT
Start: 2022-09-06 | End: 2022-10-06

## 2022-07-12 RX ORDER — OXYCODONE AND ACETAMINOPHEN 10; 325 MG/1; MG/1
1 TABLET ORAL 3 TIMES DAILY PRN
Qty: 90 TABLET | Refills: 0 | Status: SHIPPED | OUTPATIENT
Start: 2022-07-12 | End: 2022-07-12

## 2022-07-12 RX ORDER — AMLODIPINE BESYLATE 5 MG/1
5 TABLET ORAL DAILY
Qty: 90 TABLET | Refills: 1 | Status: SHIPPED | OUTPATIENT
Start: 2022-07-12 | End: 2022-12-01 | Stop reason: SDUPTHER

## 2022-07-12 RX ORDER — ADALIMUMAB 40MG/0.8ML
40 KIT SUBCUTANEOUS
Qty: 12 PEN | Refills: 3 | Status: SHIPPED | OUTPATIENT
Start: 2022-07-12 | End: 2022-12-01 | Stop reason: SDUPTHER

## 2022-07-12 RX ORDER — PRAVASTATIN SODIUM 20 MG/1
20 TABLET ORAL NIGHTLY
Qty: 90 TABLET | Refills: 1 | Status: SHIPPED | OUTPATIENT
Start: 2022-07-12 | End: 2022-12-01 | Stop reason: SDUPTHER

## 2022-07-12 RX ORDER — OXYCODONE AND ACETAMINOPHEN 10; 325 MG/1; MG/1
1 TABLET ORAL 3 TIMES DAILY PRN
Qty: 90 TABLET | Refills: 0 | Status: SHIPPED | OUTPATIENT
Start: 2023-08-08 | End: 2022-07-12

## 2022-07-12 RX ORDER — OXYCODONE AND ACETAMINOPHEN 10; 325 MG/1; MG/1
1 TABLET ORAL 3 TIMES DAILY PRN
Qty: 90 TABLET | Refills: 0 | Status: SHIPPED | OUTPATIENT
Start: 2022-08-09 | End: 2022-09-08

## 2022-07-12 NOTE — PROGRESS NOTES
Health Maintenance Due   Topic     TETANUS VACCINE      Sign Pain Contract  CONSULT WITH PCP    DEXA Scan  CONSULT WITH PCP    Colorectal Cancer Screening  Done but never received order - put in another order in cologuard     Shingles Vaccine (1 of 2) hx chickenpox ; inform pt can get vaccine at pharmacy.    COVID-19 Vaccine (4 - Booster for Moderna series) Pt will get when ready

## 2022-07-12 NOTE — PROGRESS NOTES
Chief Complaint   Patient presents with    Crohn's disease of large intestine without complication    Hypertension     Follow up        Beau Ferraro is a 52 y.o. male who presents per chief complain.  Chronic medical issues, if present, have been documented.  Acute medical issues, if present have been documented in the Chief Complaint.     Hypertension  This is a new problem. The current episode started more than 1 month ago. The problem has been rapidly improving since onset. The problem is controlled. Associated symptoms include neck pain (improving). Pertinent negatives include no anxiety, blurred vision, chest pain, headaches, malaise/fatigue, orthopnea, palpitations, peripheral edema, PND, shortness of breath or sweats. There are no associated agents to hypertension. Risk factors for coronary artery disease include male gender and sedentary lifestyle. Past treatments include calcium channel blockers. The current treatment provides moderate improvement. Compliance problems include exercise and psychosocial issues.  There is no history of angina, kidney disease, CAD/MI, CVA, heart failure, left ventricular hypertrophy, PVD or retinopathy. There is no history of chronic renal disease, coarctation of the aorta, hyperaldosteronism, hypercortisolism, hyperparathyroidism, a hypertension causing med, pheochromocytoma, renovascular disease, sleep apnea or a thyroid problem.   Back Pain  This is a chronic problem. The current episode started more than 1 year ago. The problem occurs daily. The problem is unchanged. The pain is present in the lumbar spine. The quality of the pain is described as aching. The pain does not radiate. The pain is at a severity of 7/10. The pain is moderate. The pain is the same all the time. The symptoms are aggravated by bending, position, standing and lying down. Stiffness is present all day. Associated symptoms include leg pain, paresis and tingling. Pertinent negatives include no  abdominal pain, bladder incontinence, bowel incontinence, chest pain, dysuria, fever, headaches, numbness, paresthesias, pelvic pain, perianal numbness, weakness or weight loss. Risk factors include lack of exercise and poor posture. He has tried analgesics and muscle relaxant for the symptoms. The treatment provided moderate relief.   Arthritis  Presents for follow-up visit. He complains of pain and stiffness. He reports no joint swelling or joint warmth. The symptoms have been stable. Affected locations include the neck, right hip, left hip, right knee, left knee, right ankle, left ankle, right foot, left foot, right wrist, left wrist, right elbow, left elbow, right shoulder and left shoulder. His pain is at a severity of 9/10. Associated symptoms include dry eyes, pain at night, pain while resting, rash (on face and behind ears) and uveitis. Pertinent negatives include no diarrhea, dry mouth, dysuria, fatigue, fever, Raynaud's syndrome or weight loss. Side effects of treatment include joint pain and limb pain.   Chronic Pain  Past Medical History Includes::  Rheumatoid arthritis and chronic pain syndrome  Chronicity:  Chronic  Onset:  More than 1 year ago  Frequency:  Constantly  Progression since onset:  Waxing and waning  Pain location:  Cervical spine, lumbar spine, thoracic spine, knee and hip  Medications Tried:  Hydrocodone/acetaminophen (Hycet, Lorcet, Lortab, Norco,Vicodin), Nerve Block, NSAIDs, Gabapentin (Neurontin) and oxycodone/acetaminophen (Percocet)  Previous Imaging:  MRI Scan and X-Ray  Current treatment:  Oxycodone/acetaminophen (Percocet) and tramadol  Improvement on Current Treatment:  Moderate  Goals of therapy:  Improve ADL's  Associated symptoms: difficulty with ADL's, leg pain, paresis and tingling    Associated symptoms: no abdominal pain, no behavior changes, no upper extremity pain, no bladder incontinence, no bowel incontinence, no chest pain, no constipation, no dizziness, no  shortness of breath, no headaches, no altered mental status, no numbness, no paresthesias, no urinary retention, no weakness, no weight loss, no nausea and no diaphoresis    Drug Screen: No    Pain Contract: Yes    Prescription Monitoring Program  reviewed: Yes    Psychiatric Disorders:  None  History of Substance Abuse:  None    ROS  Review of Systems   Constitutional: Negative.  Negative for activity change, appetite change, chills, diaphoresis, fatigue, fever, malaise/fatigue, unexpected weight change and weight loss.   HENT: Negative.  Negative for congestion, ear pain, hearing loss, nosebleeds, postnasal drip, rhinorrhea, sinus pressure, sneezing, sore throat and trouble swallowing.    Eyes: Negative for blurred vision, pain and visual disturbance.   Respiratory: Negative for apnea, cough, choking, chest tightness and shortness of breath.    Cardiovascular: Negative for chest pain, palpitations, orthopnea, leg swelling and PND.   Gastrointestinal: Negative for abdominal distention, abdominal pain, anal bleeding, blood in stool, bowel incontinence, constipation, diarrhea, nausea, rectal pain and vomiting.   Genitourinary: Negative for bladder incontinence, difficulty urinating, dysuria, flank pain, frequency, pelvic pain, penile pain, penile swelling, scrotal swelling, testicular pain and urgency.   Musculoskeletal: Positive for arthralgias (right hand; most joints), arthritis, back pain, gait problem, myalgias, neck pain (improving), neck stiffness and stiffness. Negative for joint swelling.   Skin: Positive for rash (on face and behind ears). Negative for color change, pallor and wound.   Allergic/Immunologic: Negative for environmental allergies, food allergies and immunocompromised state.   Neurological: Positive for tingling. Negative for dizziness, seizures, syncope, weakness, light-headedness, numbness, headaches and paresthesias.   Psychiatric/Behavioral: Positive for dysphoric mood. Negative for  "confusion, decreased concentration, hallucinations and sleep disturbance. The patient is nervous/anxious. The patient is not hyperactive.      Physical Exam  Vitals:    07/12/22 0906   BP: 116/86   Pulse: 77   Resp: 16   Temp: 98.9 °F (37.2 °C)    Body mass index is 28.65 kg/m².  Weight: 78.1 kg (172 lb 2.9 oz)   Height: 5' 5" (165.1 cm)     Physical Exam  Vitals reviewed.   Constitutional:       General: He is not in acute distress.     Appearance: Normal appearance. He is well-developed. He is not ill-appearing, toxic-appearing or diaphoretic.   HENT:      Head: Normocephalic and atraumatic.      Right Ear: Hearing, tympanic membrane, ear canal and external ear normal. No decreased hearing noted. No tenderness. No foreign body. No mastoid tenderness. No hemotympanum. Tympanic membrane is not injected, scarred, perforated, erythematous, retracted or bulging.      Left Ear: Hearing, tympanic membrane, ear canal and external ear normal. No decreased hearing noted. No tenderness. No foreign body. No mastoid tenderness. No hemotympanum. Tympanic membrane is not injected, scarred, perforated, erythematous, retracted or bulging.      Nose: Nose normal. No nasal deformity, septal deviation or rhinorrhea.      Mouth/Throat:      Dentition: Normal dentition. Does not have dentures. No dental caries.      Pharynx: Uvula midline. No oropharyngeal exudate, posterior oropharyngeal erythema or uvula swelling.      Tonsils: No tonsillar abscesses.   Eyes:      General: Lids are normal. No scleral icterus.        Right eye: No foreign body, discharge or hordeolum.         Left eye: No foreign body, discharge or hordeolum.      Conjunctiva/sclera: Conjunctivae normal.      Right eye: No chemosis or exudate.     Left eye: No chemosis or exudate.     Pupils: Pupils are equal, round, and reactive to light.   Neck:      Thyroid: No thyroid mass or thyromegaly.   Cardiovascular:      Rate and Rhythm: Normal rate and regular rhythm.      " Heart sounds: Normal heart sounds, S1 normal and S2 normal. No murmur heard.    No friction rub. No gallop.   Pulmonary:      Effort: Pulmonary effort is normal. No accessory muscle usage or respiratory distress.      Breath sounds: Normal breath sounds. No decreased breath sounds, wheezing, rhonchi or rales.   Abdominal:      General: Bowel sounds are normal. There is no distension.      Palpations: Abdomen is soft. Abdomen is not rigid. There is no mass.      Tenderness: There is no abdominal tenderness. There is no guarding or rebound.      Hernia: No hernia is present.   Musculoskeletal:      Right hand: Swelling, deformity, tenderness and bony tenderness present. No lacerations. Decreased range of motion. Decreased strength of finger abduction and thumb/finger opposition. Normal strength of wrist extension. Normal sensation. Normal sensation of the ulnar distribution and median distribution. There is no disruption of two-point discrimination. Normal capillary refill.      Cervical back: Neck supple. Bony tenderness present. No swelling, edema, deformity, erythema, signs of trauma, lacerations, rigidity, spasms, torticollis, tenderness or crepitus. Pain with movement present. Decreased range of motion.      Thoracic back: Bony tenderness present. No swelling, edema, deformity, signs of trauma, lacerations, spasms or tenderness. Decreased range of motion. No scoliosis.      Lumbar back: Bony tenderness present. No swelling, edema, deformity, signs of trauma, lacerations, spasms or tenderness. Decreased range of motion. No scoliosis.      Right hip: Tenderness and bony tenderness present. No deformity, lacerations or crepitus. Normal range of motion. Normal strength.      Left hip: Tenderness and bony tenderness present. No deformity, lacerations or crepitus. Normal range of motion. Normal strength.      Right foot: Decreased range of motion. Normal capillary refill. Swelling and tenderness present. No deformity,  laceration, bony tenderness or crepitus.      Left foot: Decreased range of motion. Normal capillary refill. Swelling and tenderness present. No deformity, laceration, bony tenderness or crepitus.   Lymphadenopathy:      Head:      Right side of head: No submental, submandibular, preauricular or posterior auricular adenopathy.      Left side of head: No submental, submandibular, preauricular or posterior auricular adenopathy.      Cervical: No cervical adenopathy.   Skin:     General: Skin is warm and dry.      Coloration: Skin is not pale.      Findings: No erythema or rash.      Comments: Covered in multiple tattoos over face, torso, and arms.   Neurological:      Mental Status: He is alert and oriented to person, place, and time.      Cranial Nerves: No cranial nerve deficit.      Sensory: No sensory deficit.      Motor: No tremor, atrophy, abnormal muscle tone or seizure activity.      Coordination: Coordination normal.      Gait: Gait normal.   Psychiatric:         Attention and Perception: He is attentive.         Mood and Affect: Mood is not anxious or depressed. Affect is not labile, blunt, angry or inappropriate.         Speech: Speech normal.         Behavior: Behavior normal. Behavior is cooperative.         Thought Content: Thought content normal.         Judgment: Judgment normal.     Assessment & Plan    Discussion of plan of care including treatment options regarding health and wellness were reviewed and discussed with patient.  Any changes to medication or treatment plan, as well as any screening blood test, imaging, or referrals to specialist, are documented.  Follow up as indicated.     1. Ankylosing spondylitis of multiple sites in spine  Chronic condition; will continue to document and monitor.  Discussed rules regarding chronic pain management with opiate/opioid therapy.  Discussed use of alternative medications to manage pain with goal of reducing and possibly discontinue opioid therapy, and  advised that in order to continue current therapy they would need to schedule appointment at least once every 90 days, as well as consent to random urine drug screening.  The legitimate medical purpose of using a controlled substance for pain management is chronic pain not amenable to surgery.  Patient has been screened through the [facility] and is not receiving controlled substances from any other provider.  At this time, I have no suspicion of illegal activity and feel that with proper usage, there is low risk for overdose.  Referral to appropriate specialist for evaluation and management placed in [facility].   - Ambulatory referral/consult to Physical Medicine Rehab; Future  - oxyCODONE-acetaminophen (PERCOCET)  mg per tablet; Take 1 tablet by mouth 3 (three) times daily as needed for Pain.  Dispense: 90 tablet; Refill: 0  - oxyCODONE-acetaminophen (PERCOCET)  mg per tablet; Take 1 tablet by mouth 3 (three) times daily as needed for Pain.  Dispense: 90 tablet; Refill: 0  - oxyCODONE-acetaminophen (PERCOCET)  mg per tablet; Take 1 tablet by mouth 3 (three) times daily as needed for Pain.  Dispense: 90 tablet; Refill: 0    2. Essential hypertension  Patient was counseled and encouraged to maintain a low sodium diet, as well as increasing physical activity.  Recommend random BP checks at home on a regular basis.  Repeat BP at end of visit was not necessary. Will continue medication at this time, and follow up in 3-6 months, or sooner if blood pressure begins to increase.     - amLODIPine (NORVASC) 5 MG tablet; Take 1 tablet (5 mg total) by mouth once daily.  Dispense: 90 tablet; Refill: 1    3. Crohn's disease of large intestine without complication  Stable; no therapeutic changes at this time.  Managed with Humira.     4. HLA-B27 spondyloarthropathy  Referral to appropriate specialist for evaluation and management placed in Epic.  Medication prescribed for use only as symptoms require.   -  Ambulatory referral/consult to Physical Medicine Rehab; Future  - oxyCODONE-acetaminophen (PERCOCET)  mg per tablet; Take 1 tablet by mouth 3 (three) times daily as needed for Pain.  Dispense: 90 tablet; Refill: 0  - oxyCODONE-acetaminophen (PERCOCET)  mg per tablet; Take 1 tablet by mouth 3 (three) times daily as needed for Pain.  Dispense: 90 tablet; Refill: 0  - oxyCODONE-acetaminophen (PERCOCET)  mg per tablet; Take 1 tablet by mouth 3 (three) times daily as needed for Pain.  Dispense: 90 tablet; Refill: 0    5. Cervical spondylosis without myelopathy  Chronic condition; will continue to document and monitor.      6. Hyperlipidemia, acquired  We discussed ways to manage cholesterol levels, including increasing whole grain foods and decreasing fried and fatty foods.  I also recommended OTC Omega 3 and Omega 6 supplements to improve overall cholesterol levels.  Will continue current therapy at this visit and will monitor lipids appropriately.   - pravastatin (PRAVACHOL) 20 MG tablet; Take 1 tablet (20 mg total) by mouth nightly.  Dispense: 90 tablet; Refill: 1    7. Gastroesophageal reflux disease without esophagitis  The current medical regimen will be continued at this time as discussed.   - famotidine (PEPCID) 40 MG tablet; Take 1 tablet (40 mg total) by mouth every 12 (twelve) hours.  Dispense: 180 tablet; Refill: 1    8. Kyphoscoliosis  Chronic condition; will continue to document and monitor.  Referral to appropriate specialist for evaluation and management placed in [facility].   - Ambulatory referral/consult to Physical Medicine Rehab; Future  - oxyCODONE-acetaminophen (PERCOCET)  mg per tablet; Take 1 tablet by mouth 3 (three) times daily as needed for Pain.  Dispense: 90 tablet; Refill: 0  - oxyCODONE-acetaminophen (PERCOCET)  mg per tablet; Take 1 tablet by mouth 3 (three) times daily as needed for Pain.  Dispense: 90 tablet; Refill: 0  - oxyCODONE-acetaminophen (PERCOCET)   mg per tablet; Take 1 tablet by mouth 3 (three) times daily as needed for Pain.  Dispense: 90 tablet; Refill: 0      Follow up in about 3 months (around 10/12/2022).      ACTIVE MEDICAL ISSUES:  Documented in Problem List    PAST MEDICAL HISTORY  Documented    PAST SURGICAL HISTORY:  Documented    SOCIAL HISTORY:  Documented    FAMILY HISTORY:  Documented    ALLERGIES AND MEDICATIONS: updated and reviewed.  Documented    Health Maintenance       Date Due Completion Date    TETANUS VACCINE Never done ---    Sign Pain Contract Never done ---    DEXA Scan 04/12/2015 4/12/2013    Colorectal Cancer Screening Never done ---    Shingles Vaccine (1 of 2) Never done ---    COVID-19 Vaccine (4 - Booster for Moderna series) 05/07/2022 1/7/2022    Lipid Panel 08/19/2022 8/19/2021    Influenza Vaccine (1) 09/01/2022 10/15/2021

## 2022-07-14 ENCOUNTER — PATIENT OUTREACH (OUTPATIENT)
Dept: ADMINISTRATIVE | Facility: HOSPITAL | Age: 52
End: 2022-07-14
Payer: MEDICARE

## 2022-08-18 ENCOUNTER — TELEPHONE (OUTPATIENT)
Dept: FAMILY MEDICINE | Facility: CLINIC | Age: 52
End: 2022-08-18
Payer: MEDICARE

## 2022-08-18 NOTE — TELEPHONE ENCOUNTER
----- Message from Nia Antoine sent at 8/18/2022 10:08 AM CDT -----  Regarding: pharm  .Type:  Pharmacy Calling to Clarify an RX    Name of Caller:  Alfred     Pharmacy Name:  [facility]     Prescription Name:  Vitamin D     What do they need to clarify? Change otc vitamin D 5000 to Vitamin D 50,000     Can you be contacted via [facility]?    Best Call Back Number:  [phone]

## 2022-08-19 NOTE — TELEPHONE ENCOUNTER
Can we verify with the patient which dose he takes, it looks like it was ordered for once daily which would be 5000 units    Blanquita Rossi MD

## 2022-08-22 RX ORDER — ERGOCALCIFEROL 1.25 MG/1
50000 CAPSULE ORAL
Qty: 12 CAPSULE | Refills: 1 | Status: SHIPPED | OUTPATIENT
Start: 2022-08-22 | End: 2022-09-21

## 2022-08-22 NOTE — TELEPHONE ENCOUNTER
Spoke to pharmacist, they are trying to get it changed because the 5,000 units daily not covered by insurance but the 50,000 units weekly is covered by insurance

## 2022-10-18 ENCOUNTER — TELEPHONE (OUTPATIENT)
Dept: FAMILY MEDICINE | Facility: CLINIC | Age: 52
End: 2022-10-18
Payer: MEDICARE

## 2022-10-18 NOTE — TELEPHONE ENCOUNTER
Spoke with patient upset that visit for today was rescheduled. Apologized to patient several times and informed patient that he has been rescheduled., stated he was aware of new office visit. Patient stated that he is out of all of his medications, after review of patient's medications noted refills on all medications. Informed patient, but patient still insist on earlier visit.Added patient's visit to wait list and patient informed at this time. Patient verbalized understanding.

## 2022-10-18 NOTE — TELEPHONE ENCOUNTER
----- Message from Adele Monique sent at 10/18/2022  1:06 PM CDT -----  Type: Patient Call Back    Who called: self     What is the request in detail: unknown    Can the clinic reply by [facility]? yes    Would the patient rather a call back or a response via [facility]? call    Best call back number: .[phone]

## 2022-11-01 ENCOUNTER — OFFICE VISIT (OUTPATIENT)
Dept: FAMILY MEDICINE | Facility: CLINIC | Age: 52
End: 2022-11-01
Payer: MEDICARE

## 2022-11-01 VITALS
HEIGHT: 65 IN | RESPIRATION RATE: 18 BRPM | OXYGEN SATURATION: 98 % | BODY MASS INDEX: 27.44 KG/M2 | DIASTOLIC BLOOD PRESSURE: 70 MMHG | HEART RATE: 77 BPM | WEIGHT: 164.69 LBS | SYSTOLIC BLOOD PRESSURE: 120 MMHG | TEMPERATURE: 99 F

## 2022-11-01 DIAGNOSIS — G95.89 MASS OF SPINAL CORD: Primary | ICD-10-CM

## 2022-11-01 DIAGNOSIS — Z12.12 ENCOUNTER FOR COLORECTAL CANCER SCREENING: ICD-10-CM

## 2022-11-01 DIAGNOSIS — M51.37 DEGENERATION OF LUMBAR OR LUMBOSACRAL INTERVERTEBRAL DISC: ICD-10-CM

## 2022-11-01 DIAGNOSIS — Z12.11 ENCOUNTER FOR COLORECTAL CANCER SCREENING: ICD-10-CM

## 2022-11-01 DIAGNOSIS — M47.812 CERVICAL SPONDYLOSIS WITHOUT MYELOPATHY: ICD-10-CM

## 2022-11-01 PROCEDURE — 3074F SYST BP LT 130 MM HG: CPT | Mod: CPTII,S$GLB,, | Performed by: FAMILY MEDICINE

## 2022-11-01 PROCEDURE — 99999 PR PBB SHADOW E&M-EST. PATIENT-LVL IV: CPT | Mod: PBBFAC,,, | Performed by: FAMILY MEDICINE

## 2022-11-01 PROCEDURE — 99215 PR OFFICE/OUTPT VISIT, EST, LEVL V, 40-54 MIN: ICD-10-PCS | Mod: S$GLB,,, | Performed by: FAMILY MEDICINE

## 2022-11-01 PROCEDURE — 3008F BODY MASS INDEX DOCD: CPT | Mod: CPTII,S$GLB,, | Performed by: FAMILY MEDICINE

## 2022-11-01 PROCEDURE — 3078F PR MOST RECENT DIASTOLIC BLOOD PRESSURE < 80 MM HG: ICD-10-PCS | Mod: CPTII,S$GLB,, | Performed by: FAMILY MEDICINE

## 2022-11-01 PROCEDURE — 99999 PR PBB SHADOW E&M-EST. PATIENT-LVL IV: ICD-10-PCS | Mod: PBBFAC,,, | Performed by: FAMILY MEDICINE

## 2022-11-01 PROCEDURE — 3078F DIAST BP <80 MM HG: CPT | Mod: CPTII,S$GLB,, | Performed by: FAMILY MEDICINE

## 2022-11-01 PROCEDURE — 99215 OFFICE O/P EST HI 40 MIN: CPT | Mod: S$GLB,,, | Performed by: FAMILY MEDICINE

## 2022-11-01 PROCEDURE — 3008F PR BODY MASS INDEX (BMI) DOCUMENTED: ICD-10-PCS | Mod: CPTII,S$GLB,, | Performed by: FAMILY MEDICINE

## 2022-11-01 PROCEDURE — 3074F PR MOST RECENT SYSTOLIC BLOOD PRESSURE < 130 MM HG: ICD-10-PCS | Mod: CPTII,S$GLB,, | Performed by: FAMILY MEDICINE

## 2022-11-01 RX ORDER — OXYCODONE AND ACETAMINOPHEN 10; 325 MG/1; MG/1
1 TABLET ORAL EVERY 8 HOURS PRN
Qty: 90 TABLET | Refills: 0 | Status: SHIPPED | OUTPATIENT
Start: 2022-11-01 | End: 2022-12-01 | Stop reason: SDUPTHER

## 2022-11-01 NOTE — PROGRESS NOTES
Health Maintenance Due   Topic     TETANUS VACCINE      Sign Pain Contract  Consult pcp      DEXA Scan  Pending order      Colorectal Cancer Screening  Pending order      Shingles Vaccine (1 of 2) Hx of chickenpox. Notified pt can get vaccine at pharmacy.      COVID-19 Vaccine (4 - Booster for Moderna series)     Lipid Panel  Consult pcp      Influenza Vaccine (1)

## 2022-11-01 NOTE — PROGRESS NOTES
Health Maintenance Due   Topic     TETANUS VACCINE      Sign Pain Contract  Consult pcp      DEXA Scan  Pending order      Colorectal Cancer Screening  Pending order      Shingles Vaccine (1 of 2) Hx of chickenpox. Notified pt can get vaccine at pharmacy.      COVID-19 Vaccine (4 - Booster for Moderna series)     Lipid Panel  Consult pcp      Influenza Vaccine (1) Pt agree to get today

## 2022-11-02 ENCOUNTER — HOSPITAL ENCOUNTER (EMERGENCY)
Facility: HOSPITAL | Age: 52
Discharge: HOME | End: 2022-11-02
Attending: EMERGENCY MEDICINE
Payer: MEDICARE

## 2022-11-02 VITALS
HEIGHT: 65 IN | OXYGEN SATURATION: 100 % | SYSTOLIC BLOOD PRESSURE: 148 MMHG | WEIGHT: 165 LBS | DIASTOLIC BLOOD PRESSURE: 86 MMHG | RESPIRATION RATE: 18 BRPM | BODY MASS INDEX: 27.49 KG/M2 | TEMPERATURE: 99 F | HEART RATE: 68 BPM

## 2022-11-02 DIAGNOSIS — L72.0 EPIDERMAL INCLUSION CYST: Primary | ICD-10-CM

## 2022-11-02 PROCEDURE — 99282 PR EMERGENCY DEPT VISIT,LEVEL II: ICD-10-PCS | Mod: ,,, | Performed by: EMERGENCY MEDICINE

## 2022-11-02 PROCEDURE — 99282 EMERGENCY DEPT VISIT SF MDM: CPT

## 2022-11-02 PROCEDURE — 99282 EMERGENCY DEPT VISIT SF MDM: CPT | Mod: ,,, | Performed by: EMERGENCY MEDICINE

## 2022-11-02 NOTE — ED PROVIDER NOTES
Encounter Date: 11/2/2022       History     Chief Complaint   Patient presents with    Back Pain     ? Abscess to thoracic spine had surg in past     52 year old male with history Crohn's disease Humira ankylosing spondylitis rheumatoid arthritis, osteoporosis, presents to the ER for evaluation due to swelling to mid back for 1 week.  He noticed this area of swelling 1 week ago, which is uncomfortable when he leans back on a chair.  He says his girlfriend tried to squeeze the area with no drainage.  He was seen by his PCP yesterday for refill of his pain medications for chronic back pain, he was advised to come to the ER to see if this might be an infection.  Patient is taking his Percocet  mg as prescribed and he reports that his pain is consistent with baseline, currently rated 8/10.  He denies recent fever, chills, nausea, vomiting, abdominal pain, chest pain, shortness of breath.  .  He has no other complaints at this time.      Review of patient's allergies indicates:   Allergen Reactions    Ampicillin Rash     Other reaction(s): Rash     Past Medical History:   Diagnosis Date    Acid reflux     Allergy     Anemia     Arthritis     Blood transfusion     Crohn's disease     Hyperlipidemia     Osteoporosis      Past Surgical History:   Procedure Laterality Date    CHOLECYSTECTOMY       Family History   Problem Relation Age of Onset    Cancer Mother         breast    Cancer Father         lung     Social History     Tobacco Use    Smoking status: Never    Smokeless tobacco: Never   Substance Use Topics    Alcohol use: No    Drug use: No     Review of Systems   Constitutional:  Negative for chills and fever.   HENT:  Negative for sore throat.    Respiratory:  Negative for shortness of breath.    Cardiovascular:  Negative for chest pain.   Gastrointestinal:  Negative for nausea and vomiting.   Genitourinary:  Negative for dysuria.   Musculoskeletal:  Positive for back pain (chronic back pain).   Skin:   Negative for color change, rash and wound.        Positive for cyst   Neurological:  Negative for weakness.   Hematological:  Does not bruise/bleed easily.     Physical Exam     Initial Vitals [11/02/22 0942]   BP Pulse Resp Temp SpO2   (!) 148/86 68 18 98.5 °F (36.9 °C) 100 %      MAP       --         Physical Exam    Nursing note and vitals reviewed.  Constitutional: He appears well-developed and well-nourished.   HENT:   Head: Atraumatic.   Eyes: Conjunctivae and EOM are normal. Pupils are equal, round, and reactive to light.   Neck: Neck supple.   Normal range of motion.  Cardiovascular:  Normal rate, regular rhythm and intact distal pulses.           Pulmonary/Chest: Breath sounds normal. No respiratory distress. He has no wheezes. He has no rhonchi. He has no rales.   Abdominal: Abdomen is soft. Bowel sounds are normal. There is no abdominal tenderness.   Musculoskeletal:      Cervical back: Normal range of motion and neck supple.     Neurological: He is alert and oriented to person, place, and time. He has normal strength. GCS score is 15. GCS eye subscore is 4. GCS verbal subscore is 5. GCS motor subscore is 6.   Skin: No rash noted.   Psychiatric: He has a normal mood and affect.           Mid thoracic back skin cyst , 2.5 x 2.5 cm area of swelling, freely moveable, without overlying induration, fluctuance or surrounding cellulitis.  No skin drainage and no significant tenderness.    ED Course   Procedures  Labs Reviewed - No data to display         Imaging Results    None          Medications - No data to display        APC / Resident Notes:   Patient presents to the ER for evaluation due to area of swelling to the midthoracic back, which has been present for 1 week.  Patient was seen by his PCP yesterday and was advised to come to the ER for possible infection.  Patient denies change from his baseline chronic back pain and is taking Percocet as prescribed.  He denies fevers or drainage from the skin.  On  exam patient has 2.5 x 2.5 cm area of swelling to mid thoracic back, freely moveable, without overlying induration, fluctuance or surrounding cellulitis.  No skin drainage and no significant tenderness.  Exam is most consistent with epidermal inclusion cyst.  I discussed the care of this pt with my supervising MD, the patient was also seen by him.  Dr. Mistry has performed bedside ultrasound, which does not show evidence of underlying fluid collection - exam not consistent with abscess.  Presentation not concerning for deep space infection or spinal infection.  Advised warm compresses, topical antibacterial ointment due to overlying scab.  We do not see an indication for oral antibiotics or I&D at this time.  Will refer back to PCP for ER follow-up exam as provided contact 2. Dermatology and General surgery to arrange for follow-up, recheck and removal of cyst.  Patient is comfortable discharge plan.  He is given ER return precautions.                     Clinical Impression:   Final diagnoses:  [L72.0] Epidermal inclusion cyst (Primary)      ED Disposition Condition    Discharge Stable          ED Prescriptions    None       Follow-up Information       Follow up With Specialties Details Why Contact Info    Azikiwe K. Lombard, MD Family Medicine Call in 1 day To discuss ER visit and schedule follow up appointment within 1 week [address] [place] [id number] [phone]      [facility]  Schedule an appointment as soon as possible for a visit in 1 week To discuss ER visit and schedule follow up appointment within 1 week - recheck epidermal inclusion cyst [address] [facility] [place] [address]  [phone]      [facility]  Call in 1 day Schedule follow up appointment with general surgery - recheck epidermal inclusion cyst [address] [facility] [place] [address]  [phone]               KRISTINE Brian  11/02/22 3884

## 2022-11-02 NOTE — ED TRIAGE NOTES
52 y.o. male arrived to the ED via personal transport from home for c/o back pain. Pt w/ hx of osteoporosis and chronic back pain reports being seen by PCP yesterday, 11/1/22, for abscess noted to vertebrae of pt's posterior thoracic. Instructed to come to the [facility] ED for further medical evaluation regarding concerns for possible spinous infxn. Denies fever, chills, N/V/D, recent falls or trauma.

## 2022-11-02 NOTE — DISCHARGE INSTRUCTIONS
Return to the ER if you develop increasing redness in this area, fever, increasing pain or other new concerning symptoms.

## 2022-11-18 ENCOUNTER — PES CALL (OUTPATIENT)
Dept: ADMINISTRATIVE | Facility: CLINIC | Age: 52
End: 2022-11-18
Payer: MEDICARE

## 2022-11-29 ENCOUNTER — PES CALL (OUTPATIENT)
Dept: ADMINISTRATIVE | Facility: CLINIC | Age: 52
End: 2022-11-29
Payer: MEDICARE

## 2022-12-01 ENCOUNTER — TELEPHONE (OUTPATIENT)
Dept: FAMILY MEDICINE | Facility: CLINIC | Age: 52
End: 2022-12-01
Payer: MEDICARE

## 2022-12-01 ENCOUNTER — PATIENT OUTREACH (OUTPATIENT)
Dept: ADMINISTRATIVE | Facility: OTHER | Age: 52
End: 2022-12-01
Payer: MEDICARE

## 2022-12-01 ENCOUNTER — OFFICE VISIT (OUTPATIENT)
Dept: FAMILY MEDICINE | Facility: CLINIC | Age: 52
End: 2022-12-01
Payer: MEDICARE

## 2022-12-01 VITALS
DIASTOLIC BLOOD PRESSURE: 80 MMHG | HEIGHT: 65 IN | RESPIRATION RATE: 18 BRPM | HEART RATE: 84 BPM | TEMPERATURE: 98 F | WEIGHT: 167.56 LBS | BODY MASS INDEX: 27.92 KG/M2 | OXYGEN SATURATION: 99 % | SYSTOLIC BLOOD PRESSURE: 134 MMHG

## 2022-12-01 DIAGNOSIS — Z23 INFLUENZA VACCINE NEEDED: ICD-10-CM

## 2022-12-01 DIAGNOSIS — E78.5 HYPERLIPIDEMIA, ACQUIRED: ICD-10-CM

## 2022-12-01 DIAGNOSIS — K50.10 CROHN'S DISEASE OF LARGE INTESTINE WITHOUT COMPLICATION: ICD-10-CM

## 2022-12-01 DIAGNOSIS — F11.90 OPIOID USE: Primary | ICD-10-CM

## 2022-12-01 DIAGNOSIS — I10 ESSENTIAL HYPERTENSION: ICD-10-CM

## 2022-12-01 DIAGNOSIS — M47.812 CERVICAL SPONDYLOSIS WITHOUT MYELOPATHY: ICD-10-CM

## 2022-12-01 DIAGNOSIS — M47.899 HLA-B27 SPONDYLOARTHROPATHY: ICD-10-CM

## 2022-12-01 DIAGNOSIS — M45.0 ANKYLOSING SPONDYLITIS OF MULTIPLE SITES IN SPINE: ICD-10-CM

## 2022-12-01 DIAGNOSIS — Z12.12 ENCOUNTER FOR COLORECTAL CANCER SCREENING: ICD-10-CM

## 2022-12-01 DIAGNOSIS — M06.4 INFLAMMATORY POLYARTHRITIS: ICD-10-CM

## 2022-12-01 DIAGNOSIS — Z12.11 ENCOUNTER FOR COLORECTAL CANCER SCREENING: ICD-10-CM

## 2022-12-01 DIAGNOSIS — L72.0 EPIDERMAL CYST: Primary | ICD-10-CM

## 2022-12-01 DIAGNOSIS — G89.29 CHRONIC TOE PAIN, LEFT FOOT: ICD-10-CM

## 2022-12-01 DIAGNOSIS — M51.37 DEGENERATION OF LUMBAR OR LUMBOSACRAL INTERVERTEBRAL DISC: ICD-10-CM

## 2022-12-01 DIAGNOSIS — M79.675 CHRONIC TOE PAIN, LEFT FOOT: ICD-10-CM

## 2022-12-01 PROCEDURE — 99215 PR OFFICE/OUTPT VISIT, EST, LEVL V, 40-54 MIN: ICD-10-PCS | Mod: S$GLB,,, | Performed by: FAMILY MEDICINE

## 2022-12-01 PROCEDURE — G0008 FLU VACCINE (QUAD) GREATER THAN OR EQUAL TO 3YO PRESERVATIVE FREE IM: ICD-10-PCS | Mod: S$GLB,,, | Performed by: FAMILY MEDICINE

## 2022-12-01 PROCEDURE — 1159F MED LIST DOCD IN RCRD: CPT | Mod: CPTII,S$GLB,, | Performed by: FAMILY MEDICINE

## 2022-12-01 PROCEDURE — 1159F PR MEDICATION LIST DOCUMENTED IN MEDICAL RECORD: ICD-10-PCS | Mod: CPTII,S$GLB,, | Performed by: FAMILY MEDICINE

## 2022-12-01 PROCEDURE — 3075F SYST BP GE 130 - 139MM HG: CPT | Mod: CPTII,S$GLB,, | Performed by: FAMILY MEDICINE

## 2022-12-01 PROCEDURE — 99215 OFFICE O/P EST HI 40 MIN: CPT | Mod: S$GLB,,, | Performed by: FAMILY MEDICINE

## 2022-12-01 PROCEDURE — 3008F PR BODY MASS INDEX (BMI) DOCUMENTED: ICD-10-PCS | Mod: CPTII,S$GLB,, | Performed by: FAMILY MEDICINE

## 2022-12-01 PROCEDURE — 3008F BODY MASS INDEX DOCD: CPT | Mod: CPTII,S$GLB,, | Performed by: FAMILY MEDICINE

## 2022-12-01 PROCEDURE — 3079F DIAST BP 80-89 MM HG: CPT | Mod: CPTII,S$GLB,, | Performed by: FAMILY MEDICINE

## 2022-12-01 PROCEDURE — 90686 FLU VACCINE (QUAD) GREATER THAN OR EQUAL TO 3YO PRESERVATIVE FREE IM: ICD-10-PCS | Mod: S$GLB,,, | Performed by: FAMILY MEDICINE

## 2022-12-01 PROCEDURE — 90686 IIV4 VACC NO PRSV 0.5 ML IM: CPT | Mod: S$GLB,,, | Performed by: FAMILY MEDICINE

## 2022-12-01 PROCEDURE — 3075F PR MOST RECENT SYSTOLIC BLOOD PRESS GE 130-139MM HG: ICD-10-PCS | Mod: CPTII,S$GLB,, | Performed by: FAMILY MEDICINE

## 2022-12-01 PROCEDURE — 3079F PR MOST RECENT DIASTOLIC BLOOD PRESSURE 80-89 MM HG: ICD-10-PCS | Mod: CPTII,S$GLB,, | Performed by: FAMILY MEDICINE

## 2022-12-01 PROCEDURE — 99999 PR PBB SHADOW E&M-EST. PATIENT-LVL V: CPT | Mod: PBBFAC,,, | Performed by: FAMILY MEDICINE

## 2022-12-01 PROCEDURE — 99999 PR PBB SHADOW E&M-EST. PATIENT-LVL V: ICD-10-PCS | Mod: PBBFAC,,, | Performed by: FAMILY MEDICINE

## 2022-12-01 PROCEDURE — G0008 ADMIN INFLUENZA VIRUS VAC: HCPCS | Mod: S$GLB,,, | Performed by: FAMILY MEDICINE

## 2022-12-01 RX ORDER — OXYCODONE AND ACETAMINOPHEN 10; 325 MG/1; MG/1
1 TABLET ORAL EVERY 8 HOURS PRN
Qty: 90 TABLET | Refills: 0 | Status: SHIPPED | OUTPATIENT
Start: 2023-01-26 | End: 2023-03-02

## 2022-12-01 RX ORDER — AMLODIPINE BESYLATE 5 MG/1
5 TABLET ORAL DAILY
Qty: 90 TABLET | Refills: 3 | Status: SHIPPED | OUTPATIENT
Start: 2022-12-01

## 2022-12-01 RX ORDER — ADALIMUMAB 40MG/0.8ML
40 KIT SUBCUTANEOUS
Qty: 12 PEN | Refills: 3 | Status: SHIPPED | OUTPATIENT
Start: 2022-12-01 | End: 2023-03-03 | Stop reason: SDUPTHER

## 2022-12-01 RX ORDER — OXYCODONE AND ACETAMINOPHEN 10; 325 MG/1; MG/1
1 TABLET ORAL EVERY 8 HOURS PRN
Qty: 90 TABLET | Refills: 0 | Status: SHIPPED | OUTPATIENT
Start: 2022-12-01 | End: 2023-03-02 | Stop reason: SDUPTHER

## 2022-12-01 RX ORDER — ERGOCALCIFEROL 1.25 MG/1
50000 CAPSULE ORAL
COMMUNITY
Start: 2022-11-01

## 2022-12-01 RX ORDER — PRAVASTATIN SODIUM 20 MG/1
20 TABLET ORAL NIGHTLY
Qty: 90 TABLET | Refills: 3 | Status: SHIPPED | OUTPATIENT
Start: 2022-12-01 | End: 2023-02-03 | Stop reason: SDUPTHER

## 2022-12-01 RX ORDER — OXYCODONE AND ACETAMINOPHEN 10; 325 MG/1; MG/1
1 TABLET ORAL EVERY 4 HOURS PRN
Qty: 90 TABLET | Refills: 0 | Status: SHIPPED | OUTPATIENT
Start: 2022-12-29 | End: 2023-03-02 | Stop reason: SDUPTHER

## 2022-12-01 NOTE — TELEPHONE ENCOUNTER
----- Message from Dari Magallon sent at 12/1/2022  3:52 PM CST -----  Type: Patient Call Back    Who called:Manohar/ [facility]     What is the request in detail: Asking if PT can be prescribed Norcan     Can the clinic reply by [facility]? No     Would the patient rather a call back or a response via [facility]? Call     Best call back number: [phone]

## 2022-12-01 NOTE — PROGRESS NOTES
Health Maintenance Due   Topic     TETANUS VACCINE      Sign Pain Contract  Consult pcp      Naloxone Prescription  Consult pcp      DEXA Scan  Pending order      Colorectal Cancer Screening  Pending order      Urine Drug Screen  Consult pcp      Shingles Vaccine (1 of 2)     COVID-19 Vaccine (4 - Booster for Moderna series)     Lipid Panel  Consult pcp      Influenza Vaccine (1) Pt agree to get today

## 2022-12-01 NOTE — PROGRESS NOTES
CHW - Initial Contact    This Community Health Worker completed the Social Determinant of Health questionnaire with patient via telephone today.    Pt identified barriers of most importance are: transportation  Referrals to community agencies completed with patient/caregiver consent outside of [facility] include:   Referrals were put through [facility] - yes  Support and Services:   Other information discussed the patient needs / wants help with: home health referral via provider  Follow up required: yes  Follow-up Outreach - Due: 12/15/2022

## 2022-12-05 RX ORDER — NALOXONE HYDROCHLORIDE 1 MG/ML
1 INJECTION INTRAMUSCULAR; INTRAVENOUS; SUBCUTANEOUS
Qty: 2 ML | Refills: 2 | Status: SHIPPED | OUTPATIENT
Start: 2022-12-05 | End: 2023-03-02 | Stop reason: SDUPTHER

## 2022-12-19 ENCOUNTER — PATIENT OUTREACH (OUTPATIENT)
Dept: ADMINISTRATIVE | Facility: HOSPITAL | Age: 52
End: 2022-12-19
Payer: MEDICARE

## 2022-12-19 NOTE — PROGRESS NOTES
Left voice message patient is overdue for a colonoscopy ambulatory referral/consult to Endo Procedure  order placed.

## 2022-12-22 ENCOUNTER — PATIENT OUTREACH (OUTPATIENT)
Dept: ADMINISTRATIVE | Facility: OTHER | Age: 52
End: 2022-12-22
Payer: MEDICARE

## 2022-12-22 NOTE — PROGRESS NOTES
CHW - Follow Up    This Community Health Worker completed a follow up visit with patient via telephone today.  Pt/Caregiver reported: pt was not available at time of call; took callback number for pt  Community Health Worker provided: pt's number was available but son was present at last visit; called son but wife answered; spoke with pt's daughter in law; stated she'd take call back number for father  Follow up required: yes  Follow-up Outreach - Due: 12/30/2022

## 2022-12-30 NOTE — PROGRESS NOTES
Subjective:       Patient ID: Beau Ferraro is a 52 y.o. male.    Chief Complaint: Establish Care      HPI  52-year-old male presents to establish care.  States he would like refills on his pain medication.  States he has more back pain at this time.  Patient has a large cyst his back.  Feels it has gotten bigger and more painful.  Denies any fever chills.        Review of Systems   Constitutional: Negative.    HENT: Negative.     Respiratory: Negative.     Cardiovascular: Negative.    Gastrointestinal: Negative.    Endocrine: Negative.    Genitourinary: Negative.    Musculoskeletal: Negative.    Neurological: Negative.    Psychiatric/Behavioral: Negative.          Past Medical History:   Diagnosis Date    Acid reflux     Allergy     Anemia     Arthritis     Blood transfusion     Crohn's disease     Hyperlipidemia     Osteoporosis      Past Surgical History:   Procedure Laterality Date    CHOLECYSTECTOMY       Family History   Problem Relation Age of Onset    Cancer Mother         breast    Cancer Father         lung     Social History     Socioeconomic History    Marital status: Single   Tobacco Use    Smoking status: Never    Smokeless tobacco: Never   Substance and Sexual Activity    Alcohol use: No    Drug use: No     Social Determinants of Health     Financial Resource Strain: Medium Risk    Difficulty of Paying Living Expenses: Somewhat hard   Food Insecurity: No Food Insecurity    Worried About Running Out of Food in the Last Year: Never true    Ran Out of Food in the Last Year: Never true   Transportation Needs: Unmet Transportation Needs    Lack of Transportation (Medical): Yes    Lack of Transportation (Non-Medical): Yes   Physical Activity: Inactive    Days of Exercise per Week: 0 days    Minutes of Exercise per Session: 0 min   Stress: Stress Concern Present    Feeling of Stress : Very much   Social Connections: Unknown    Frequency of Communication with Friends and Family: More than three times a  week    Frequency of Social Gatherings with Friends and Family: More than three times a week    Attends Restoration Services: Patient refused    Active Member of Clubs or Organizations: No    Attends Club or Organization Meetings: Patient refused    Marital Status: Never    Housing Stability: High Risk    Unable to Pay for Housing in the Last Year: Yes    Number of Places Lived in the Last Year: 1    Unstable Housing in the Last Year: No       Current Outpatient Medications:     back brace Misc, 1 each by Misc.(Non-Drug; Combo Route) route Daily., Disp: 1 each, Rfl: 0    clonazePAM (KLONOPIN) 2 MG Tab, Take 1 tablet (2 mg total) by mouth every evening., Disp: 30 tablet, Rfl: 0    famotidine (PEPCID) 40 MG tablet, Take 1 tablet (40 mg total) by mouth every 12 (twelve) hours., Disp: 180 tablet, Rfl: 1    adalimumab (HUMIRA PEN) PnKt injection, Inject 1 pen (40 mg total) into the skin every 7 days., Disp: 12 pen, Rfl: 3    amLODIPine (NORVASC) 5 MG tablet, Take 1 tablet (5 mg total) by mouth once daily., Disp: 90 tablet, Rfl: 3    ergocalciferol (ERGOCALCIFEROL) 50,000 unit Cap, Take 50,000 Units by mouth every 7 days., Disp: , Rfl:     naloxone (NARCAN) 1 mg/mL injection, Inject 1 mL (1 mg total) into the muscle as needed., Disp: 2 mL, Rfl: 2    nystatin (MYCOSTATIN) powder, Apply topically 2 (two) times daily., Disp: 120 g, Rfl: 2    oxyCODONE-acetaminophen (PERCOCET)  mg per tablet, Take 1 tablet by mouth every 8 (eight) hours as needed for Pain., Disp: 90 tablet, Rfl: 0    [START ON 1/26/2023] oxyCODONE-acetaminophen (PERCOCET)  mg per tablet, Take 1 tablet by mouth every 8 (eight) hours as needed for Pain., Disp: 90 tablet, Rfl: 0    oxyCODONE-acetaminophen (PERCOCET)  mg per tablet, Take 1 tablet by mouth every 4 (four) hours as needed for Pain., Disp: 90 tablet, Rfl: 0    pravastatin (PRAVACHOL) 20 MG tablet, Take 1 tablet (20 mg total) by mouth nightly., Disp: 90 tablet, Rfl: 3  "  Objective:      Vitals:    11/01/22 1402   BP: 120/70   BP Location: Left arm   Patient Position: Sitting   BP Method: Small (Manual)   Pulse: 77   Resp: 18   Temp: 99.2 °F (37.3 °C)   TempSrc: Oral   SpO2: 98%   Weight: 74.7 kg (164 lb 10.9 oz)   Height: 5' 5" (1.651 m)       Physical Exam  Constitutional:       General: He is not in acute distress.     Appearance: He is not diaphoretic.   HENT:      Head: Normocephalic and atraumatic.   Eyes:      Conjunctiva/sclera: Conjunctivae normal.   Pulmonary:      Effort: Pulmonary effort is normal.   Musculoskeletal:      Cervical back: Neck supple.   Skin:     Findings: Lesion (in lumbar area) present. No rash.   Neurological:      Mental Status: He is alert and oriented to person, place, and time.   Psychiatric:         Behavior: Behavior normal.         Thought Content: Thought content normal.         Judgment: Judgment normal.          Assessment:       1. Mass of spinal cord    2. Encounter for colorectal cancer screening    3. Cervical spondylosis without myelopathy    4. Degeneration of lumbar or lumbosacral intervertebral disc          Plan:       Mass of spinal cord    Encounter for colorectal cancer screening  -     Ambulatory referral/consult to Endo Procedure ; Future; Expected date: 11/02/2022    Cervical spondylosis without myelopathy  -     Discontinue: oxyCODONE-acetaminophen (PERCOCET)  mg per tablet; Take 1 tablet by mouth every 8 (eight) hours as needed for Pain.  Dispense: 90 tablet; Refill: 0    Degeneration of lumbar or lumbosacral intervertebral disc  -     Discontinue: oxyCODONE-acetaminophen (PERCOCET)  mg per tablet; Take 1 tablet by mouth every 8 (eight) hours as needed for Pain.  Dispense: 90 tablet; Refill: 0    Other orders  -     Cancel: DXA Bone Density Spine And Hip; Future; Expected date: 11/01/2022    Patient was advised to go to ED due to worsening pain.  Patient stated cyst has grown in in size as well.  " Follow-up next month.          Future Appointments   Date Time Provider Department Center   3/2/2023 10:20 AM MD RUTHANN Sharpe [facility]       Patient note was created using [employer].  Any errors in syntax or even information may not have been identified and edited on initial review prior to signing this note.

## 2022-12-30 NOTE — PROGRESS NOTES
Subjective:       Patient ID: Beau Ferraro is a 52 y.o. male.    Chief Complaint: Follow-up      Follow-up  52-year-old male presents to establish care.  Patient had extensive past medical history.  States he would like refills on his pain medications.  Patient found to have a epidermal cyst his back.  Went to ED after visit.  Was found to be infected.  States he has foot pain as well.              Review of Systems   Constitutional: Negative.    HENT: Negative.     Respiratory: Negative.     Cardiovascular: Negative.    Gastrointestinal: Negative.    Endocrine: Negative.    Genitourinary: Negative.    Musculoskeletal:  Positive for back pain.   Neurological: Negative.    Psychiatric/Behavioral: Negative.          Past Medical History:   Diagnosis Date    Acid reflux     Allergy     Anemia     Arthritis     Blood transfusion     Crohn's disease     Hyperlipidemia     Osteoporosis      Past Surgical History:   Procedure Laterality Date    CHOLECYSTECTOMY       Family History   Problem Relation Age of Onset    Cancer Mother         breast    Cancer Father         lung     Social History     Socioeconomic History    Marital status: Single   Tobacco Use    Smoking status: Never    Smokeless tobacco: Never   Substance and Sexual Activity    Alcohol use: No    Drug use: No     Social Determinants of Health     Financial Resource Strain: Medium Risk    Difficulty of Paying Living Expenses: Somewhat hard   Food Insecurity: No Food Insecurity    Worried About Running Out of Food in the Last Year: Never true    Ran Out of Food in the Last Year: Never true   Transportation Needs: Unmet Transportation Needs    Lack of Transportation (Medical): Yes    Lack of Transportation (Non-Medical): Yes   Physical Activity: Inactive    Days of Exercise per Week: 0 days    Minutes of Exercise per Session: 0 min   Stress: Stress Concern Present    Feeling of Stress : Very much   Social Connections: Unknown    Frequency of Communication  with Friends and Family: More than three times a week    Frequency of Social Gatherings with Friends and Family: More than three times a week    Attends Zoroastrianism Services: Patient refused    Active Member of Clubs or Organizations: No    Attends Club or Organization Meetings: Patient refused    Marital Status: Never    Housing Stability: High Risk    Unable to Pay for Housing in the Last Year: Yes    Number of Places Lived in the Last Year: 1    Unstable Housing in the Last Year: No       Current Outpatient Medications:     back brace Misc, 1 each by Misc.(Non-Drug; Combo Route) route Daily., Disp: 1 each, Rfl: 0    clonazePAM (KLONOPIN) 2 MG Tab, Take 1 tablet (2 mg total) by mouth every evening., Disp: 30 tablet, Rfl: 0    ergocalciferol (ERGOCALCIFEROL) 50,000 unit Cap, Take 50,000 Units by mouth every 7 days., Disp: , Rfl:     famotidine (PEPCID) 40 MG tablet, Take 1 tablet (40 mg total) by mouth every 12 (twelve) hours., Disp: 180 tablet, Rfl: 1    adalimumab (HUMIRA PEN) PnKt injection, Inject 1 pen (40 mg total) into the skin every 7 days., Disp: 12 pen, Rfl: 3    amLODIPine (NORVASC) 5 MG tablet, Take 1 tablet (5 mg total) by mouth once daily., Disp: 90 tablet, Rfl: 3    naloxone (NARCAN) 1 mg/mL injection, Inject 1 mL (1 mg total) into the muscle as needed., Disp: 2 mL, Rfl: 2    nystatin (MYCOSTATIN) powder, Apply topically 2 (two) times daily., Disp: 120 g, Rfl: 2    oxyCODONE-acetaminophen (PERCOCET)  mg per tablet, Take 1 tablet by mouth every 8 (eight) hours as needed for Pain., Disp: 90 tablet, Rfl: 0    [START ON 1/26/2023] oxyCODONE-acetaminophen (PERCOCET)  mg per tablet, Take 1 tablet by mouth every 8 (eight) hours as needed for Pain., Disp: 90 tablet, Rfl: 0    oxyCODONE-acetaminophen (PERCOCET)  mg per tablet, Take 1 tablet by mouth every 4 (four) hours as needed for Pain., Disp: 90 tablet, Rfl: 0    pravastatin (PRAVACHOL) 20 MG tablet, Take 1 tablet (20 mg total) by  "mouth nightly., Disp: 90 tablet, Rfl: 3   Objective:      Vitals:    12/01/22 1503   BP: 134/80   BP Location: Left arm   Patient Position: Sitting   BP Method: Small (Manual)   Pulse: 84   Resp: 18   Temp: 98 °F (36.7 °C)   TempSrc: Oral   SpO2: 99%   Weight: 76 kg (167 lb 8.8 oz)   Height: 5' 5" (1.651 m)       Physical Exam  Constitutional:       General: He is not in acute distress.     Appearance: He is not diaphoretic.   HENT:      Head: Normocephalic and atraumatic.   Eyes:      Conjunctiva/sclera: Conjunctivae normal.   Pulmonary:      Effort: Pulmonary effort is normal.   Musculoskeletal:      Cervical back: Neck supple.   Skin:     Findings: No rash.   Neurological:      Mental Status: He is alert and oriented to person, place, and time.   Psychiatric:         Behavior: Behavior normal.         Thought Content: Thought content normal.         Judgment: Judgment normal.          Assessment:       1. Epidermal cyst    2. Encounter for colorectal cancer screening    3. Cervical spondylosis without myelopathy    4. Degeneration of lumbar or lumbosacral intervertebral disc    5. Ankylosing spondylitis of multiple sites in spine    6. Chronic toe pain, left foot    7. Inflammatory polyarthritis    8. Crohn's disease of large intestine without complication    9. Influenza vaccine needed    10. Essential hypertension    11. HLA-B27 spondyloarthropathy    12. Hyperlipidemia, acquired          Plan:       Epidermal cyst  -     Cancel: Ambulatory referral/consult to General Surgery; Future; Expected date: 12/08/2022    Encounter for colorectal cancer screening    Cervical spondylosis without myelopathy  -     oxyCODONE-acetaminophen (PERCOCET)  mg per tablet; Take 1 tablet by mouth every 8 (eight) hours as needed for Pain.  Dispense: 90 tablet; Refill: 0  -     oxyCODONE-acetaminophen (PERCOCET)  mg per tablet; Take 1 tablet by mouth every 8 (eight) hours as needed for Pain.  Dispense: 90 tablet; Refill: " 0  -     oxyCODONE-acetaminophen (PERCOCET)  mg per tablet; Take 1 tablet by mouth every 4 (four) hours as needed for Pain.  Dispense: 90 tablet; Refill: 0    Degeneration of lumbar or lumbosacral intervertebral disc  -     oxyCODONE-acetaminophen (PERCOCET)  mg per tablet; Take 1 tablet by mouth every 8 (eight) hours as needed for Pain.  Dispense: 90 tablet; Refill: 0  -     oxyCODONE-acetaminophen (PERCOCET)  mg per tablet; Take 1 tablet by mouth every 8 (eight) hours as needed for Pain.  Dispense: 90 tablet; Refill: 0  -     oxyCODONE-acetaminophen (PERCOCET)  mg per tablet; Take 1 tablet by mouth every 4 (four) hours as needed for Pain.  Dispense: 90 tablet; Refill: 0    Ankylosing spondylitis of multiple sites in spine  -     Ambulatory referral/consult to Physical/Occupational Therapy; Future; Expected date: 12/08/2022  -     oxyCODONE-acetaminophen (PERCOCET)  mg per tablet; Take 1 tablet by mouth every 8 (eight) hours as needed for Pain.  Dispense: 90 tablet; Refill: 0  -     oxyCODONE-acetaminophen (PERCOCET)  mg per tablet; Take 1 tablet by mouth every 8 (eight) hours as needed for Pain.  Dispense: 90 tablet; Refill: 0  -     oxyCODONE-acetaminophen (PERCOCET)  mg per tablet; Take 1 tablet by mouth every 4 (four) hours as needed for Pain.  Dispense: 90 tablet; Refill: 0  -     adalimumab (HUMIRA PEN) PnKt injection; Inject 1 pen (40 mg total) into the skin every 7 days.  Dispense: 12 pen; Refill: 3    Chronic toe pain, left foot  -     Ambulatory referral/consult to Physical/Occupational Therapy; Future; Expected date: 12/08/2022    Inflammatory polyarthritis  -     Ambulatory referral/consult to Physical/Occupational Therapy; Future; Expected date: 12/08/2022  -     oxyCODONE-acetaminophen (PERCOCET)  mg per tablet; Take 1 tablet by mouth every 8 (eight) hours as needed for Pain.  Dispense: 90 tablet; Refill: 0  -     oxyCODONE-acetaminophen (PERCOCET)   mg per tablet; Take 1 tablet by mouth every 8 (eight) hours as needed for Pain.  Dispense: 90 tablet; Refill: 0  -     oxyCODONE-acetaminophen (PERCOCET)  mg per tablet; Take 1 tablet by mouth every 4 (four) hours as needed for Pain.  Dispense: 90 tablet; Refill: 0    Crohn's disease of large intestine without complication  -     adalimumab (HUMIRA PEN) PnKt injection; Inject 1 pen (40 mg total) into the skin every 7 days.  Dispense: 12 pen; Refill: 3    Influenza vaccine needed  -     Influenza - Quadrivalent *Preferred* (6 months+) (PF)    Essential hypertension  -     amLODIPine (NORVASC) 5 MG tablet; Take 1 tablet (5 mg total) by mouth once daily.  Dispense: 90 tablet; Refill: 3    HLA-B27 spondyloarthropathy  -     adalimumab (HUMIRA PEN) PnKt injection; Inject 1 pen (40 mg total) into the skin every 7 days.  Dispense: 12 pen; Refill: 3    Hyperlipidemia, acquired  -     pravastatin (PRAVACHOL) 20 MG tablet; Take 1 tablet (20 mg total) by mouth nightly.  Dispense: 90 tablet; Refill: 3      Refill pain meds due to his extensive chronic conditions.    40 minute visit with more than 50% of time spent on counseling.             Future Appointments   Date Time Provider Department Center   3/2/2023 10:20 AM Ha Wooten MD [facility] [place]       Patient note was created using [employer].  Any errors in syntax or even information may not have been identified and edited on initial review prior to signing this note.

## 2023-01-03 ENCOUNTER — PATIENT OUTREACH (OUTPATIENT)
Dept: ADMINISTRATIVE | Facility: OTHER | Age: 53
End: 2023-01-03
Payer: MEDICARE

## 2023-01-03 NOTE — PROGRESS NOTES
CHW - Outreach Attempt    Community Health Worker left a voicemail message for 2nd attempt to contact patient regarding: transportation/home health  Community Health Worker to attempt to contact patient on: 1/11/23

## 2023-01-04 ENCOUNTER — PATIENT OUTREACH (OUTPATIENT)
Dept: ADMINISTRATIVE | Facility: HOSPITAL | Age: 53
End: 2023-01-04
Payer: MEDICARE

## 2023-01-04 DIAGNOSIS — I10 ESSENTIAL HYPERTENSION: ICD-10-CM

## 2023-01-11 ENCOUNTER — CLINICAL SUPPORT (OUTPATIENT)
Dept: REHABILITATION | Facility: OTHER | Age: 53
End: 2023-01-11
Payer: MEDICARE

## 2023-01-11 DIAGNOSIS — M06.4 INFLAMMATORY POLYARTHRITIS: ICD-10-CM

## 2023-01-11 DIAGNOSIS — M53.86 DECREASED RANGE OF MOTION OF LUMBAR SPINE: ICD-10-CM

## 2023-01-11 DIAGNOSIS — M45.0 ANKYLOSING SPONDYLITIS OF MULTIPLE SITES IN SPINE: ICD-10-CM

## 2023-01-11 DIAGNOSIS — R29.3 POSTURE IMBALANCE: Primary | ICD-10-CM

## 2023-01-11 DIAGNOSIS — M79.675 CHRONIC TOE PAIN, LEFT FOOT: ICD-10-CM

## 2023-01-11 DIAGNOSIS — G89.29 CHRONIC TOE PAIN, LEFT FOOT: ICD-10-CM

## 2023-01-11 PROCEDURE — 97162 PT EVAL MOD COMPLEX 30 MIN: CPT | Mod: HCNC,PN

## 2023-01-11 PROCEDURE — 97530 THERAPEUTIC ACTIVITIES: CPT | Mod: HCNC,PN

## 2023-01-11 PROCEDURE — 97110 THERAPEUTIC EXERCISES: CPT | Mod: HCNC,PN

## 2023-01-11 NOTE — PLAN OF CARE
[facility]  Physical Therapy Initial Evaluation    Name: Beau Ferarro  Clinic Number: [id number]    Therapy Diagnosis:   Encounter Diagnoses   Name Primary?    Ankylosing spondylitis of multiple sites in spine     Chronic toe pain, left foot     Inflammatory polyarthritis     Posture imbalance Yes    Decreased range of motion of lumbar spine      Physician: Ha Wooten MD    Physician Orders: PT Eval and Treat   Medical Diagnosis from Referral: M45.0 (ICD-10-CM) - Ankylosing spondylitis of multiple sites in spine M79.675,G89.29 (ICD-10-CM) - Chronic toe pain, left foot M06.4 (ICD-10-CM) - Inflammatory polyarthritis   Evaluation Date: 1/6/2023  Authorization Period Expiration: 12/01/2023   Plan of Care Expiration: 4/7/2023  Progress Note Due: 2/11/2023  Visit # / Visits authorized: 1/ 1   FOTO: 1/5     Time In: 2:45 pm  Time Out: 3:30 pm  Total Billable Time: 45 minutes    Precautions: Standard, osteoporosis, ankylosing spondylitis, hx of multiple lumbar fractures secondary to trauma    Subjective   Date of onset: progressive worsening over the last 6 months  History of current condition - Beau reports: he broke his back and his neck when he jumped off a 3rd floor balcony from half-way in 2009, that's when the pain started. He's had PT in the past which was helpful, but he's back to being hunched over. Also neck, hands, and his toes. Back is the worst.      Past Medical History:   Diagnosis Date    Acid reflux     Allergy     Anemia     Arthritis     Blood transfusion     Crohn's disease     Hyperlipidemia     Osteoporosis      Beau Ferraro  has a past surgical history that includes Cholecystectomy.    Beau has a current medication list which includes the following prescription(s): humira pen, amlodipine, back brace, clonazepam, ergocalciferol, famotidine, naloxone, nystatin, oxycodone-acetaminophen, [START ON 1/26/2023] oxycodone-acetaminophen, oxycodone-acetaminophen, and  "pravastatin.    Review of patient's allergies indicates:   Allergen Reactions    Ampicillin Rash     Other reaction(s): Rash        Imaging, bone scan films: 6/22/2018  "Old fractures are seen at T12, L1, L2, L3 and L4 superior endplates, with the most severe fracture at L1 where there is focal kyphosis and and anterior wedge type configuration of the L1 body.  This appears similar to the prior radiographs.  No new acute fracture is seen.  Multilevel degenerative disc disease is present.  There may be fusion of the posterior elements at multiple levels."    Prior Therapy: yes, for this issue in 2019/2020, but discontinued due to pandemic  Social History: lives alone, 1 [address]  Occupation: disability  Prior Level of Function: able to stand and walk for longer distances, he was able to stand up all the way  Current Level of Function: using a crutch occasionally, he has to use a wheelchair sometimes when his knees and feet swell up, unable to stand up all the way, neck and back pain all the time    Pain:  Current 9/10, worst 10/10, best 5/10   Location: all the way down the spine,   Description: ache, sharp  Aggravating Factors: cold weather, rain,  Easing Factors: laying on his side, laying on his stomach    Pts goals: stand up straighter, help with his movement    Objective     Observation: Pt is alert and oriented, good historian    Posture:  severe forwards posture and kyphotic posture of thoracic and lumbar spine    Cervical ROM: 75% all directions, non painful    Lumbar Range of Motion:    Percent WFL Pain   Flexion  100%   no        Extension  0%   no        Left Side Bending  10% no        Right Side Bending  10% no        Left rotation    25% no        Right Rotation    25% no             5X Sit to Stand: 12.4s (lacking significant hip extension)  Norms:   11.4 seconds for 60-69 years age groups  12.6 seconds for 70 - 79 years old  14.8 seconds for  80-89 years old      Bridge hold: 15s, posterior chain " weakness  Single Leg Bridge: 5s at 50% of his standard bridge height      Palpation: lumbar kyphosis with SP protrusion      Flexibility:    Tutu test: R = severe restriction ; L = severe restriction      CMS Impairment/Limitation/Restriction for FOTO Lumbar Survey    Therapist reviewed FOTO scores for Beau Ferraro on 1/11/2023.   FOTO documents entered into [employer] - see Media section.    Limitation Score: 71%    Goal: 58%         TREATMENT   Total Treatment time separate from Evaluation: 23 minutes    Beau received therapeutic exercises to develop strength, endurance, ROM, flexibility, posture, and core stabilization for 10 minutes including:  STS with full upright posture in standing 15s hold x 12  Prone lying x 1 min  Tutu stretch x 1 min  Bridge 5s x 12    Beau participated in dynamic functional therapeutic activities to improve functional performance for 13  minutes, including:  Pt education regarding role hope hip extension mobility limitation in forward flexed posture, avoiding prolonged sitting and using lumbar roll when sitting, performing hip extension strengthening immediately following stretching to strengthening into new available ROM        Home Exercises and Patient Education Provided    Education provided:   - PT POC, therapy expectations, see above for additional pt education    Written Home Exercises Provided: yes.  Exercises were reviewed and Beau was able to demonstrate them prior to the end of the session.  Beau demonstrated good  understanding of the education provided.     See EMR under Patient Instructions for exercises provided 1/11/2023.    Assessment   Beau is a 52 y.o. male referred to outpatient Physical Therapy with a medical diagnosis of M45.0 (ICD-10-CM) - Ankylosing spondylitis of multiple sites in spine M79.675,G89.29 (ICD-10-CM) - Chronic toe pain, left foot M06.4 (ICD-10-CM) - Inflammatory polyarthritis. Pt presents with associated sever postural dysfunction with  kyphotic lumbar curve, posterior chain weakness, reduced hip extension ROM. Above impairments and pain limiting pt's functional mobility and performance of ADLs.    Pt prognosis is Good.   Pt will benefit from skilled outpatient Physical Therapy to address the deficits stated above and in the chart below, provide pt/family education, and to maximize pt's level of independence.     Plan of care discussed with patient: Yes  Pt's spiritual, cultural and educational needs considered and patient is agreeable to the plan of care and goals as stated below:     Anticipated Barriers for therapy: progression of prior issue    Medical Necessity is demonstrated by the following  History  Co-morbidities and personal factors that may impact the plan of care Co-morbidities:   Chron's disease , ankylosing spondylitis, inflammatory polyarthritis, osteoporosis    Personal Factors:   lifestyle     high   Examination  Body Structures and Functions, activity limitations and participation restrictions that may impact the plan of care Body Regions:   neck  back  lower extremities  upper extremities  trunk    Body Systems:    gross symmetry  ROM  strength  balance  gait  transfers  transitions  motor control  motor learning    Participation Restrictions:   Walking   Standing   Reaching  grasping    Activity limitations:   Learning and applying knowledge  no deficits    General Tasks and Commands  no deficits    Communication  no deficits    Mobility  lifting and carrying objects  fine hand use (grasping/picking up)  walking  moving around using equipment (WC)    Self care  washing oneself (bathing, drying, washing hands)  caring for body parts (brushing teeth, shaving, grooming)  toileting  dressing  eating  drinking  looking after one's health    Domestic Life  doing house work (cleaning house, washing dishes, laundry)  assisting others    Interactions/Relationships  family relationships    Life Areas  no deficits    Community and Social  Life  community life  recreation and leisure         high   Clinical Presentation evolving clinical presentation with changing clinical characteristics moderate   Decision Making/ Complexity Score: moderate     Goals:  Short Term Goals (4 Weeks):   1. Pt will report reduction in pain of the lumbar spine to <=6/10 for ease with ADL's  2. Pt will demonstrate improved upright posture with minimal cuing for ease with functional positioning in home and community.  3. Pt will demonstrate standing bridge hold for > 30s for improved posterior chain strength for upright posture.    Long Term Goals (12 Weeks):   1. Pt will report being independent with HEP for maintenance of improvements gained during therapy sessions  2. PT will improve single leg bridge to full available ROM x 5 reps each side for improved posterior chain strength for upright posture.  3. Pt to demonstrate improved functional ability with FOTO limitation <=58% disability.    Plan   Plan of care Certification: 1/11/2023 to 4/7/2023.    Outpatient Physical Therapy 2 times weekly for 12 visits to include the following interventions: Gait Training, Manual Therapy, Moist Heat/ Ice, Neuromuscular Re-ed, Patient Education, Self Care, Therapeutic Activities, and Therapeutic Exercise.     Leonarda Kimbrough, PT, DPT

## 2023-01-12 ENCOUNTER — PATIENT OUTREACH (OUTPATIENT)
Dept: ADMINISTRATIVE | Facility: OTHER | Age: 53
End: 2023-01-12
Payer: MEDICARE

## 2023-01-12 PROBLEM — M53.86 DECREASED RANGE OF MOTION OF LUMBAR SPINE: Status: ACTIVE | Noted: 2023-01-12

## 2023-01-12 PROBLEM — R29.3 POSTURE IMBALANCE: Status: ACTIVE | Noted: 2023-01-12

## 2023-01-12 NOTE — PROGRESS NOTES
CHW - Follow Up    This Community Health Worker completed a follow up visit with patient via telephone today.  Pt states that he has started rehab and physical therapy but has not started home health; does rehab and physical therapy in home and at a facility near his home 2 to 3 times a week; say he used to receive home health services in the past but due to the pandemic it came to a stop; will speak with PCP about home health referral  Follow up required: yes  Follow-up Outreach - Due: 1/30/2023

## 2023-01-17 ENCOUNTER — HOSPITAL ENCOUNTER (EMERGENCY)
Facility: HOSPITAL | Age: 53
Discharge: HOME | End: 2023-01-17
Attending: EMERGENCY MEDICINE
Payer: MEDICARE

## 2023-01-17 VITALS
SYSTOLIC BLOOD PRESSURE: 142 MMHG | TEMPERATURE: 99 F | RESPIRATION RATE: 18 BRPM | HEIGHT: 65 IN | BODY MASS INDEX: 27.99 KG/M2 | OXYGEN SATURATION: 99 % | WEIGHT: 168 LBS | DIASTOLIC BLOOD PRESSURE: 80 MMHG | HEART RATE: 74 BPM

## 2023-01-17 DIAGNOSIS — L08.9 INFECTION, SKIN, STAPH: Primary | ICD-10-CM

## 2023-01-17 DIAGNOSIS — L02.91 ABSCESS OF MULTIPLE SITES: ICD-10-CM

## 2023-01-17 DIAGNOSIS — B95.8 INFECTION, SKIN, STAPH: Primary | ICD-10-CM

## 2023-01-17 DIAGNOSIS — H92.01 POSTERIOR AURICULAR PAIN, RIGHT: ICD-10-CM

## 2023-01-17 DIAGNOSIS — Z79.60 LONG-TERM USE OF IMMUNOSUPPRESSANT MEDICATION: ICD-10-CM

## 2023-01-17 PROCEDURE — 99283 EMERGENCY DEPT VISIT LOW MDM: CPT | Mod: HCNC,,, | Performed by: PHYSICIAN ASSISTANT

## 2023-01-17 PROCEDURE — 99284 EMERGENCY DEPT VISIT MOD MDM: CPT | Mod: HCNC

## 2023-01-17 PROCEDURE — 99283 PR EMERGENCY DEPT VISIT,LEVEL III: ICD-10-PCS | Mod: HCNC,,, | Performed by: PHYSICIAN ASSISTANT

## 2023-01-17 PROCEDURE — 25000003 PHARM REV CODE 250: Mod: HCNC | Performed by: PHYSICIAN ASSISTANT

## 2023-01-17 RX ORDER — SULFAMETHOXAZOLE AND TRIMETHOPRIM 800; 160 MG/1; MG/1
1 TABLET ORAL
Status: COMPLETED | OUTPATIENT
Start: 2023-01-17 | End: 2023-01-17

## 2023-01-17 RX ORDER — ONDANSETRON 4 MG/1
4 TABLET, ORALLY DISINTEGRATING ORAL EVERY 6 HOURS PRN
Qty: 5 TABLET | Refills: 0 | Status: SHIPPED | OUTPATIENT
Start: 2023-01-17 | End: 2023-03-02

## 2023-01-17 RX ORDER — SULFAMETHOXAZOLE AND TRIMETHOPRIM 800; 160 MG/1; MG/1
1 TABLET ORAL 2 TIMES DAILY
Qty: 20 TABLET | Refills: 0 | Status: SHIPPED | OUTPATIENT
Start: 2023-01-17 | End: 2023-01-27

## 2023-01-17 RX ADMIN — SULFAMETHOXAZOLE AND TRIMETHOPRIM 1 TABLET: 800; 160 TABLET ORAL at 05:01

## 2023-01-17 NOTE — ED NOTES
Pt ambulatory to ED for RLE pain and edema.  Pt has 2 raised reddened spots to RLE, let warm to touch.  Pt endorses nausea and fever, improved with tylenol.  Pt denies SOB, denies chest pain.  RLE ROM mildly decreased due to pain.  Skin warm/dry, afebrile.  A/Ox4.

## 2023-01-17 NOTE — ED PROVIDER NOTES
"Encounter Date: 1/17/2023       History     Chief Complaint   Patient presents with    Leg Pain     Sores on my leg, got same thing when I was in half-way    Ear Problem     The patient is a 52 year old male, who reports having a past medical history of RA, Crohn's disease, and endocarditis. He is on Humira. He presents to the ER c/o several small skin abscess/boils. He states that he has a history of Staph skin infections in the past. He states, "I had Staph for a long time while doing time in [place], but it's been years ago, now it looks like I have it again". He states that he has had 3-4 different boils on both of his legs that he squeezed and expressed/drained pus from over the past 2 weeks. He states that he currently has a boil on his right anterior thigh that is draining. He also reports that he has a rash behind his right ear for the past month. He has tried using rubbing alcohol and Neosporin with little improvement. He denies any fever or chills. He denies history of diabetes.     Review of patient's allergies indicates:   Allergen Reactions    Ampicillin Rash     Other reaction(s): Rash     Past Medical History:   Diagnosis Date    Acid reflux     Allergy     Anemia     Arthritis     Blood transfusion     Crohn's disease     Endocarditis     Hyperlipidemia     Incarceration     Osteoporosis     Staph skin infection      Past Surgical History:   Procedure Laterality Date    CHOLECYSTECTOMY       Family History   Problem Relation Age of Onset    Cancer Mother         breast    Cancer Father         lung     Social History     Tobacco Use    Smoking status: Never    Smokeless tobacco: Never   Substance Use Topics    Alcohol use: No    Drug use: No     Review of Systems   Constitutional:  Negative for activity change, appetite change, chills, fatigue and fever.   HENT:  Negative for congestion, ear pain, facial swelling, hearing loss and trouble swallowing.    Eyes:  Negative for pain and visual " disturbance.   Respiratory:  Negative for cough and shortness of breath.    Cardiovascular:  Negative for chest pain, palpitations and leg swelling.   Gastrointestinal:  Negative for abdominal pain, diarrhea, nausea and vomiting.   Endocrine: Negative for polydipsia and polyuria.   Genitourinary:  Negative for decreased urine volume.   Musculoskeletal:  Negative for arthralgias, back pain, gait problem, myalgias and neck pain.   Skin:  Positive for rash and wound.   Allergic/Immunologic: Positive for immunocompromised state.   Neurological:  Negative for dizziness, syncope, speech difficulty, light-headedness, numbness and headaches.   Hematological:  Negative for adenopathy.   Psychiatric/Behavioral:  Negative for agitation, behavioral problems and confusion.      Physical Exam     Initial Vitals [01/17/23 1358]   BP Pulse Resp Temp SpO2   (!) 142/80 74 18 98.6 °F (37 °C) 99 %      MAP       --         Physical Exam    Nursing note and vitals reviewed.  Constitutional: He appears well-developed and well-nourished. He is not diaphoretic. No distress.   HENT:   Head: Normocephalic.   There is a sub-acute appearing excoriated rash to the right posterior auricular fold; no fluctuance/abscess/induration; otic exam unremarkable otherwise. No adenopathy.    Eyes: Conjunctivae are normal.   Neck: Neck supple.   Cardiovascular:  Normal rate.           Pulmonary/Chest: No respiratory distress.   Abdominal: Abdomen is soft. He exhibits no distension. There is no abdominal tenderness. There is no rebound and no guarding.   Musculoskeletal:         General: No tenderness or edema. Normal range of motion.      Cervical back: Neck supple.      Comments: There are several nearly resolved small superficial boils to bilateral lower extremities. There is a 2-3 cm fluctuant, erythematous, tender superficial cutaneous abscess to the anterior right thigh - easily expressed/drained by squeezing during exam - no indication for I & D at  this time.      Lymphadenopathy:     He has no cervical adenopathy.   Neurological: He is alert and oriented to person, place, and time. He has normal strength. No sensory deficit.   Skin: Skin is warm and dry. Abscess noted.   Psychiatric: He has a normal mood and affect. His behavior is normal. Thought content normal.             ED Course   Procedures  Labs Reviewed   HIV 1 / 2 ANTIBODY          Imaging Results    None          Medications   sulfamethoxazole-trimethoprim 800-160mg per tablet 1 tablet (1 tablet Oral Given 1/17/23 1711)     Medical Decision Making:   History:   Old Medical Records: I decided to obtain old medical records.  Initial Assessment:   53 yo male, hx of RA & Crohn's on Humira, here c/o staph infection - boils to legs and rash to right posterior auricular region   Differential Diagnosis:   Staph skin infection, abscess, cellulitis, rash, immunocompromised state, etc   ED Management:  Vital signs reviewed - benign   Records reviewed   Fluctuant superficial draining abscess to right thigh expressed, cleaned with Betadine and dressed with sterile gauze   PO bactrim given in the ER - pt reports mild nausea with antibiotic - Zofran provided   Rx for Bactrim DS and Bactroban provided   Pt advised to follow up with his PCP in the next 2 days for re-check   Pt advised to return to the ER promptly if worse in any way                         Clinical Impression:   Final diagnoses:  [L08.9, B95.8] Infection, skin, staph (Primary)  [L02.91] Abscess of multiple sites  [H92.01] Posterior auricular pain, right  [Z79.60] Long-term use of immunosuppressant medication        ED Disposition Condition    Discharge Stable          ED Prescriptions       Medication Sig Dispense Start Date End Date Auth. Provider    sulfamethoxazole-trimethoprim 800-160mg (BACTRIM DS) 800-160 mg Tab Take 1 tablet by mouth 2 (two) times daily. for 10 days 20 tablet 1/17/2023 1/27/2023 Alan Cortes PA-C    BACTROBAN NASAL 2  % Oint 1 g by Nasal route 2 (two) times daily. for 10 days 20 g 1/17/2023 1/27/2023 Alan Cortes PA-C    ondansetron (ZOFRAN-ODT) 4 MG TbDL Take 1 tablet (4 mg total) by mouth every 6 (six) hours as needed (nausea). 5 tablet 1/17/2023 -- Alan Cortes PA-C          Follow-up Information       Follow up With Specialties Details Why Contact Info    Ha Wooten MD Family Medicine Schedule an appointment as soon as possible for a visit in 2 days For wound re-check [address] [place] [address]  [phone]      [facility] - Emergency Dept Emergency Medicine  If symptoms worsen [address] [id number] [phone]             Alan Cortes PA-C  01/17/23 8588

## 2023-01-17 NOTE — ED NOTES
Patient identifiers for Beau Ferraro 52 y.o. male checked and correct.  Chief Complaint   Patient presents with    Leg Pain     [place] on my leg, [place]    Ear Problem     Past Medical History:   Diagnosis Date    Acid reflux     Allergy     Anemia     Arthritis     Blood transfusion     Crohn's disease     Endocarditis     Hyperlipidemia     Incarceration     Osteoporosis     Staph skin infection      Allergies reported:   Review of patient's allergies indicates:   Allergen Reactions    Ampicillin Rash     Other reaction(s): Rash         LOC: Patient is awake, alert, and aware of environment with an appropriate affect. Patient is oriented x 3 and speaking appropriately.  APPEARANCE: Patient resting comfortably and in no acute distress. Patient is clean and well groomed, patient's clothing is properly fastened.  HEENT: **  SKIN: The skin is warm and dry. Patient has normal skin turgor and moist mucus membranes. Skin is intact; no bruising or breakdown noted.  MUSKULOSKELETAL: Patient is moving all extremities well, no obvious deformities noted. Pulses intact.   RESPIRATORY: Airway is open and patent. Respirations are spontaneous and non-labored with normal effort and rate, BBS=clear  CARDIAC: Patient has a normal rate and rhythm. ** on cardiac monitor,No peripheral edema noted.   ABDOMEN: No distention noted. Bowel sounds active in all 4 quadrants. Soft and non-tender upon palpation.  NEUROLOGICAL: pupils **mm, PERRL. Facial expression is symmetrical. Hand grasps are equal bilaterally. Normal sensation in all extremities when touched with finger.

## 2023-01-23 ENCOUNTER — DOCUMENTATION ONLY (OUTPATIENT)
Dept: REHABILITATION | Facility: OTHER | Age: 53
End: 2023-01-23

## 2023-01-24 NOTE — PROGRESS NOTES
Patient was scheduled for a physical therapy treatment appointment at [facility] on 1/23/2023. Patient failed to appear for the appointment without prior notification today.     Hector Kimbrough III, PTA

## 2023-01-25 ENCOUNTER — DOCUMENTATION ONLY (OUTPATIENT)
Dept: REHABILITATION | Facility: OTHER | Age: 53
End: 2023-01-25
Payer: MEDICARE

## 2023-01-25 NOTE — PROGRESS NOTES
Patient was scheduled for a physical therapy treatment appointment at [facility] on 1/25/2023. Patient failed to appear for the appointment without prior notification today.     Hector Kimbrough III, PTA

## 2023-01-31 ENCOUNTER — PATIENT OUTREACH (OUTPATIENT)
Dept: ADMINISTRATIVE | Facility: OTHER | Age: 53
End: 2023-01-31
Payer: MEDICARE

## 2023-02-03 DIAGNOSIS — K21.9 GASTROESOPHAGEAL REFLUX DISEASE WITHOUT ESOPHAGITIS: ICD-10-CM

## 2023-02-03 DIAGNOSIS — E78.5 HYPERLIPIDEMIA, ACQUIRED: ICD-10-CM

## 2023-02-03 NOTE — TELEPHONE ENCOUNTER
----- Message from Tiffanieodilia Albert sent at 2/3/2023  9:46 AM CST -----  Regarding: salma-  .Type:  RX Refill Request    Who Called: salma    Refill or New Rx:refill    RX Name and Strength:pravastatin (PRAVACHOL) 20 MG tablet,  famotidine (PEPCID) 40 MG tablet    How is the patient currently taking it? (ex. 1XDay):both once daily    Is this a 30 day or 90 day RX:90    Preferred Pharmacy with phone number   [facility] - [place], [place] - [address]  [address]  [place] [address]  Phone: [phone] Fax: [phone]       Local or Mail Order:local    Ordering Provider:kendell    Would the patient rather a call back or a response via [facility]? no    Best Call Back Number:[phone]    Additional Information:

## 2023-02-03 NOTE — TELEPHONE ENCOUNTER
No new care gaps identified.  [facility] Embedded Care Gaps. Reference number: [id number]. 2/03/2023   10:00:11 AM CST

## 2023-02-04 RX ORDER — FAMOTIDINE 40 MG/1
40 TABLET, FILM COATED ORAL EVERY 12 HOURS
Qty: 180 TABLET | Refills: 1 | Status: SHIPPED | OUTPATIENT
Start: 2023-02-04

## 2023-02-04 RX ORDER — PRAVASTATIN SODIUM 20 MG/1
20 TABLET ORAL NIGHTLY
Qty: 90 TABLET | Refills: 3 | Status: SHIPPED | OUTPATIENT
Start: 2023-02-04

## 2023-02-17 ENCOUNTER — PATIENT OUTREACH (OUTPATIENT)
Dept: ADMINISTRATIVE | Facility: OTHER | Age: 53
End: 2023-02-17
Payer: MEDICARE

## 2023-02-17 NOTE — PROGRESS NOTES
CHW - Follow Up    This Community Health Worker completed a follow up visit with patient via telephone today.  Pt/Caregiver reported: would like to start home health if possible for help around home; says that it helped tremendously in the past  Community Health Worker provided: informed pt that home health concern was presented to provider; will update pt on response at pcp's return to clonic  Follow up required: Y  Follow-up Outreach - Due: 2/15/2023

## 2023-02-17 NOTE — PROGRESS NOTES
CHW - Outreach Attempt    Community Health Worker left a voicemail message for 1st attempt to contact patient regarding: home health response  Community Health Worker to attempt to contact patient on: 3/3/23

## 2023-03-02 ENCOUNTER — OFFICE VISIT (OUTPATIENT)
Dept: FAMILY MEDICINE | Facility: CLINIC | Age: 53
End: 2023-03-02
Payer: MEDICARE

## 2023-03-02 VITALS
OXYGEN SATURATION: 99 % | HEART RATE: 72 BPM | RESPIRATION RATE: 18 BRPM | TEMPERATURE: 98 F | HEIGHT: 65 IN | DIASTOLIC BLOOD PRESSURE: 78 MMHG | BODY MASS INDEX: 28.21 KG/M2 | WEIGHT: 169.31 LBS | SYSTOLIC BLOOD PRESSURE: 124 MMHG

## 2023-03-02 DIAGNOSIS — F11.90 OPIOID USE: ICD-10-CM

## 2023-03-02 DIAGNOSIS — R79.9 ABNORMAL FINDING OF BLOOD CHEMISTRY, UNSPECIFIED: ICD-10-CM

## 2023-03-02 DIAGNOSIS — M51.37 DEGENERATION OF LUMBAR OR LUMBOSACRAL INTERVERTEBRAL DISC: ICD-10-CM

## 2023-03-02 DIAGNOSIS — L02.91 CUTANEOUS ABSCESS, UNSPECIFIED SITE: Primary | ICD-10-CM

## 2023-03-02 DIAGNOSIS — M47.812 CERVICAL SPONDYLOSIS WITHOUT MYELOPATHY: ICD-10-CM

## 2023-03-02 DIAGNOSIS — I10 PRIMARY HYPERTENSION: ICD-10-CM

## 2023-03-02 DIAGNOSIS — M45.0 ANKYLOSING SPONDYLITIS OF MULTIPLE SITES IN SPINE: ICD-10-CM

## 2023-03-02 DIAGNOSIS — Z11.3 SCREEN FOR STD (SEXUALLY TRANSMITTED DISEASE): ICD-10-CM

## 2023-03-02 DIAGNOSIS — M06.4 INFLAMMATORY POLYARTHRITIS: ICD-10-CM

## 2023-03-02 PROCEDURE — 3074F SYST BP LT 130 MM HG: CPT | Mod: HCNC,CPTII,S$GLB, | Performed by: FAMILY MEDICINE

## 2023-03-02 PROCEDURE — 1159F PR MEDICATION LIST DOCUMENTED IN MEDICAL RECORD: ICD-10-PCS | Mod: HCNC,CPTII,S$GLB, | Performed by: FAMILY MEDICINE

## 2023-03-02 PROCEDURE — 3078F DIAST BP <80 MM HG: CPT | Mod: HCNC,CPTII,S$GLB, | Performed by: FAMILY MEDICINE

## 2023-03-02 PROCEDURE — 3008F PR BODY MASS INDEX (BMI) DOCUMENTED: ICD-10-PCS | Mod: HCNC,CPTII,S$GLB, | Performed by: FAMILY MEDICINE

## 2023-03-02 PROCEDURE — 3074F PR MOST RECENT SYSTOLIC BLOOD PRESSURE < 130 MM HG: ICD-10-PCS | Mod: HCNC,CPTII,S$GLB, | Performed by: FAMILY MEDICINE

## 2023-03-02 PROCEDURE — 99999 PR PBB SHADOW E&M-EST. PATIENT-LVL IV: CPT | Mod: PBBFAC,HCNC,, | Performed by: FAMILY MEDICINE

## 2023-03-02 PROCEDURE — 3078F PR MOST RECENT DIASTOLIC BLOOD PRESSURE < 80 MM HG: ICD-10-PCS | Mod: HCNC,CPTII,S$GLB, | Performed by: FAMILY MEDICINE

## 2023-03-02 PROCEDURE — 99999 PR PBB SHADOW E&M-EST. PATIENT-LVL IV: ICD-10-PCS | Mod: PBBFAC,HCNC,, | Performed by: FAMILY MEDICINE

## 2023-03-02 PROCEDURE — 99215 OFFICE O/P EST HI 40 MIN: CPT | Mod: HCNC,S$GLB,, | Performed by: FAMILY MEDICINE

## 2023-03-02 PROCEDURE — 99215 PR OFFICE/OUTPT VISIT, EST, LEVL V, 40-54 MIN: ICD-10-PCS | Mod: HCNC,S$GLB,, | Performed by: FAMILY MEDICINE

## 2023-03-02 PROCEDURE — 1159F MED LIST DOCD IN RCRD: CPT | Mod: HCNC,CPTII,S$GLB, | Performed by: FAMILY MEDICINE

## 2023-03-02 PROCEDURE — 3008F BODY MASS INDEX DOCD: CPT | Mod: HCNC,CPTII,S$GLB, | Performed by: FAMILY MEDICINE

## 2023-03-02 RX ORDER — NALOXONE HYDROCHLORIDE 4 MG/.1ML
SPRAY NASAL
COMMUNITY
Start: 2022-12-09

## 2023-03-02 RX ORDER — SULFAMETHOXAZOLE AND TRIMETHOPRIM 800; 160 MG/1; MG/1
1 TABLET ORAL 2 TIMES DAILY
Qty: 20 TABLET | Refills: 0 | Status: SHIPPED | OUTPATIENT
Start: 2023-03-02 | End: 2023-03-12

## 2023-03-02 RX ORDER — NALOXONE HYDROCHLORIDE 1 MG/ML
1 INJECTION INTRAMUSCULAR; INTRAVENOUS; SUBCUTANEOUS
Qty: 2 ML | Refills: 2 | Status: SHIPPED | OUTPATIENT
Start: 2023-03-02

## 2023-03-02 RX ORDER — CLINDAMYCIN HYDROCHLORIDE 300 MG/1
CAPSULE ORAL
COMMUNITY
Start: 2023-02-23

## 2023-03-02 RX ORDER — MUPIROCIN 20 MG/G
OINTMENT TOPICAL 3 TIMES DAILY
Qty: 30 G | Refills: 0 | Status: SHIPPED | OUTPATIENT
Start: 2023-03-02 | End: 2023-08-25 | Stop reason: SDUPTHER

## 2023-03-02 RX ORDER — MUPIROCIN 20 MG/G
OINTMENT TOPICAL
COMMUNITY
Start: 2023-02-23 | End: 2023-03-02

## 2023-03-02 RX ORDER — OXYCODONE AND ACETAMINOPHEN 10; 325 MG/1; MG/1
1 TABLET ORAL EVERY 8 HOURS PRN
Qty: 90 TABLET | Refills: 0 | Status: SHIPPED | OUTPATIENT
Start: 2023-03-30 | End: 2023-06-02 | Stop reason: SDUPTHER

## 2023-03-02 RX ORDER — OXYCODONE AND ACETAMINOPHEN 10; 325 MG/1; MG/1
1 TABLET ORAL EVERY 8 HOURS PRN
Qty: 90 TABLET | Refills: 0 | Status: SHIPPED | OUTPATIENT
Start: 2023-04-27 | End: 2023-06-02 | Stop reason: SDUPTHER

## 2023-03-02 RX ORDER — INFLUENZA VIRUS VACCINE 15; 15; 15; 15 UG/.5ML; UG/.5ML; UG/.5ML; UG/.5ML
SUSPENSION INTRAMUSCULAR
COMMUNITY
Start: 2022-12-01 | End: 2023-03-02 | Stop reason: ALTCHOICE

## 2023-03-02 RX ORDER — OXYCODONE AND ACETAMINOPHEN 10; 325 MG/1; MG/1
1 TABLET ORAL EVERY 4 HOURS PRN
Qty: 90 TABLET | Refills: 0 | Status: SHIPPED | OUTPATIENT
Start: 2023-03-02 | End: 2023-06-02

## 2023-03-02 NOTE — PROGRESS NOTES
Subjective:       Patient ID: Beau Ferraro is a 52 y.o. male.    Chief Complaint: Follow-up      Follow-up    52-year-old male presents for three-month follow-up.  Would like refills on his pain medication.  Went to ED and then urgent care for skin abscess.  States it was drained at urgent care.  Still has tenderness in the area.  Still mildly enlarged.  Denies any fever chills but is on immuno suppressive medication.        Review of Systems   Constitutional: Negative.    HENT: Negative.     Respiratory: Negative.     Cardiovascular: Negative.    Gastrointestinal: Negative.    Endocrine: Negative.    Genitourinary: Negative.    Musculoskeletal: Negative.    Neurological: Negative.    Psychiatric/Behavioral: Negative.          Past Medical History:   Diagnosis Date    Acid reflux     Allergy     Anemia     Arthritis     Blood transfusion     Crohn's disease     Endocarditis     Hyperlipidemia     Incarceration     Osteoporosis     Staph skin infection      Past Surgical History:   Procedure Laterality Date    CHOLECYSTECTOMY       Family History   Problem Relation Age of Onset    Cancer Mother         breast    Cancer Father         lung     Social History     Socioeconomic History    Marital status: Single   Tobacco Use    Smoking status: Never    Smokeless tobacco: Never   Substance and Sexual Activity    Alcohol use: No    Drug use: No     Social Determinants of Health     Financial Resource Strain: Medium Risk    Difficulty of Paying Living Expenses: Somewhat hard   Food Insecurity: No Food Insecurity    Worried About Running Out of Food in the Last Year: Never true    Ran Out of Food in the Last Year: Never true   Transportation Needs: Unmet Transportation Needs    Lack of Transportation (Medical): Yes    Lack of Transportation (Non-Medical): Yes   Physical Activity: Inactive    Days of Exercise per Week: 0 days    Minutes of Exercise per Session: 0 min   Stress: Stress Concern Present    Feeling of Stress  : Very much   Social Connections: Unknown    Frequency of Communication with Friends and Family: More than three times a week    Frequency of Social Gatherings with Friends and Family: More than three times a week    Attends Mosque Services: Patient refused    Active Member of Clubs or Organizations: No    Attends Club or Organization Meetings: Patient refused    Marital Status: Never    Housing Stability: High Risk    Unable to Pay for Housing in the Last Year: Yes    Number of Places Lived in the Last Year: 1    Unstable Housing in the Last Year: No       Current Outpatient Medications:     adalimumab (HUMIRA PEN) PnKt injection, Inject 1 pen (40 mg total) into the skin every 7 days., Disp: 12 pen, Rfl: 3    amLODIPine (NORVASC) 5 MG tablet, Take 1 tablet (5 mg total) by mouth once daily., Disp: 90 tablet, Rfl: 3    back brace Misc, 1 each by Misc.(Non-Drug; Combo Route) route Daily., Disp: 1 each, Rfl: 0    clindamycin (CLEOCIN) 300 MG capsule, Take by mouth., Disp: , Rfl:     ergocalciferol (ERGOCALCIFEROL) 50,000 unit Cap, Take 50,000 Units by mouth every 7 days., Disp: , Rfl:     famotidine (PEPCID) 40 MG tablet, Take 1 tablet (40 mg total) by mouth every 12 (twelve) hours., Disp: 180 tablet, Rfl: 1    naloxone (NARCAN) 4 mg/actuation Spry, SPRAY 1 SPRAY in one nostril AS NEEDED FOR OVERDOSE; if no response REPEAT in 2- 3 minutes in other nostril, Disp: , Rfl:     pravastatin (PRAVACHOL) 20 MG tablet, Take 1 tablet (20 mg total) by mouth nightly., Disp: 90 tablet, Rfl: 3    mupirocin (BACTROBAN) 2 % ointment, Apply topically 3 (three) times daily., Disp: 30 g, Rfl: 0    naloxone (NARCAN) 1 mg/mL injection, Inject 1 mL (1 mg total) into the muscle as needed., Disp: 2 mL, Rfl: 2    [START ON 4/27/2023] oxyCODONE-acetaminophen (PERCOCET)  mg per tablet, Take 1 tablet by mouth every 8 (eight) hours as needed for Pain., Disp: 90 tablet, Rfl: 0    [START ON 3/30/2023] oxyCODONE-acetaminophen  "(PERCOCET)  mg per tablet, Take 1 tablet by mouth every 8 (eight) hours as needed for Pain., Disp: 90 tablet, Rfl: 0    oxyCODONE-acetaminophen (PERCOCET)  mg per tablet, Take 1 tablet by mouth every 4 (four) hours as needed for Pain., Disp: 90 tablet, Rfl: 0    sulfamethoxazole-trimethoprim 800-160mg (BACTRIM DS) 800-160 mg Tab, Take 1 tablet by mouth 2 (two) times daily. for 10 days, Disp: 20 tablet, Rfl: 0   Objective:      Vitals:    03/02/23 0954   BP: 124/78   BP Location: Left arm   Patient Position: Sitting   BP Method: Small (Manual)   Pulse: 72   Resp: 18   Temp: 98.1 °F (36.7 °C)   TempSrc: Oral   SpO2: 99%   Weight: 76.8 kg (169 lb 5 oz)   Height: 5' 5" (1.651 m)       Physical Exam  Constitutional:       General: He is not in acute distress.     Appearance: He is not diaphoretic.   HENT:      Head: Normocephalic and atraumatic.   Eyes:      Conjunctiva/sclera: Conjunctivae normal.   Pulmonary:      Effort: Pulmonary effort is normal.   Musculoskeletal:      Cervical back: Neck supple.   Skin:     Findings: No rash.      Comments: RUE- bloody discharge from wound site. TTP   Neurological:      Mental Status: He is alert and oriented to person, place, and time.   Psychiatric:         Behavior: Behavior normal.         Thought Content: Thought content normal.         Judgment: Judgment normal.          Assessment:       1. Cutaneous abscess, unspecified site    2. Cervical spondylosis without myelopathy    3. Degeneration of lumbar or lumbosacral intervertebral disc    4. Ankylosing spondylitis of multiple sites in spine    5. Inflammatory polyarthritis    6. Opioid use    7. Screen for STD (sexually transmitted disease)    8. Primary hypertension    9. Abnormal finding of blood chemistry, unspecified          Plan:       Cutaneous abscess, unspecified site  -     Ambulatory referral/consult to General Surgery; Future; Expected date: 03/09/2023  -     sulfamethoxazole-trimethoprim 800-160mg " (BACTRIM DS) 800-160 mg Tab; Take 1 tablet by mouth 2 (two) times daily. for 10 days  Dispense: 20 tablet; Refill: 0  -     mupirocin (BACTROBAN) 2 % ointment; Apply topically 3 (three) times daily.  Dispense: 30 g; Refill: 0    Cervical spondylosis without myelopathy  -     oxyCODONE-acetaminophen (PERCOCET)  mg per tablet; Take 1 tablet by mouth every 8 (eight) hours as needed for Pain.  Dispense: 90 tablet; Refill: 0  -     oxyCODONE-acetaminophen (PERCOCET)  mg per tablet; Take 1 tablet by mouth every 8 (eight) hours as needed for Pain.  Dispense: 90 tablet; Refill: 0  -     oxyCODONE-acetaminophen (PERCOCET)  mg per tablet; Take 1 tablet by mouth every 4 (four) hours as needed for Pain.  Dispense: 90 tablet; Refill: 0    Degeneration of lumbar or lumbosacral intervertebral disc  -     oxyCODONE-acetaminophen (PERCOCET)  mg per tablet; Take 1 tablet by mouth every 8 (eight) hours as needed for Pain.  Dispense: 90 tablet; Refill: 0  -     oxyCODONE-acetaminophen (PERCOCET)  mg per tablet; Take 1 tablet by mouth every 8 (eight) hours as needed for Pain.  Dispense: 90 tablet; Refill: 0  -     oxyCODONE-acetaminophen (PERCOCET)  mg per tablet; Take 1 tablet by mouth every 4 (four) hours as needed for Pain.  Dispense: 90 tablet; Refill: 0    Ankylosing spondylitis of multiple sites in spine  -     oxyCODONE-acetaminophen (PERCOCET)  mg per tablet; Take 1 tablet by mouth every 8 (eight) hours as needed for Pain.  Dispense: 90 tablet; Refill: 0  -     oxyCODONE-acetaminophen (PERCOCET)  mg per tablet; Take 1 tablet by mouth every 8 (eight) hours as needed for Pain.  Dispense: 90 tablet; Refill: 0  -     oxyCODONE-acetaminophen (PERCOCET)  mg per tablet; Take 1 tablet by mouth every 4 (four) hours as needed for Pain.  Dispense: 90 tablet; Refill: 0    Inflammatory polyarthritis  -     oxyCODONE-acetaminophen (PERCOCET)  mg per tablet; Take 1 tablet by mouth  every 8 (eight) hours as needed for Pain.  Dispense: 90 tablet; Refill: 0  -     oxyCODONE-acetaminophen (PERCOCET)  mg per tablet; Take 1 tablet by mouth every 8 (eight) hours as needed for Pain.  Dispense: 90 tablet; Refill: 0  -     oxyCODONE-acetaminophen (PERCOCET)  mg per tablet; Take 1 tablet by mouth every 4 (four) hours as needed for Pain.  Dispense: 90 tablet; Refill: 0    Opioid use  -     naloxone (NARCAN) 1 mg/mL injection; Inject 1 mL (1 mg total) into the muscle as needed.  Dispense: 2 mL; Refill: 2    Screen for STD (sexually transmitted disease)  -     HIV 1/2 Ag/Ab (4th Gen); Future; Expected date: 03/02/2023  -     HEPATITIS C ANTIBODY; Future; Expected date: 03/02/2023  -     RPR; Future; Expected date: 03/02/2023  -     C. trachomatis/N. gonorrhoeae by AMP DNA [facility]; Urine; Future; Expected date: 03/02/2023    Primary hypertension  -     CBC Auto Differential; Future; Expected date: 03/02/2023  -     Comprehensive Metabolic Panel; Future; Expected date: 03/02/2023  -     Hemoglobin A1C; Future; Expected date: 03/02/2023  -     TSH; Future; Expected date: 03/02/2023  -     Lipid Panel; Future; Expected date: 03/02/2023    Abnormal finding of blood chemistry, unspecified  -     Hemoglobin A1C; Future; Expected date: 03/02/2023    Unable to tolerate clinda.  Will give Bactrim which was initially given.  Refer patient to surgery for possible I and D. follow-up in 3 months.    40 minute visit with more than 50% of time spent on counseling.             Future Appointments   Date Time Provider Department Center   3/3/2023 10:00 AM Delano Shetty Jr., MD [facility] [place] [facility]   5/30/2023  9:00 AM LAB, [facility] [place]   5/30/2023  9:15 AM SPECIMEN, [facility] [place]   6/2/2023  1:20 PM Ha Wooten MD [facility] [place]       Patient note was created using MModal.  Any errors in syntax or even information may not have been identified and edited on initial  review prior to signing this note.

## 2023-03-02 NOTE — PROGRESS NOTES
Health Maintenance Due   Topic     Pneumococcal Vaccines (Age 0-64) (1 - PCV)     TETANUS VACCINE  Consult pcp    Sign Pain Contract  Consult pcp    Shingles Vaccine (1 of 2)  hx chicken pox. Notified pt can get vaccine at pharmacy    DEXA Scan  Pending order    Colorectal Cancer Screening  Pending order    Urine Drug Screen  Consult pcp    COVID-19 Vaccine (4 - Booster for Moderna series) Not offered at this office    Lipid Panel  Consult pcp

## 2023-03-03 DIAGNOSIS — K50.10 CROHN'S DISEASE OF LARGE INTESTINE WITHOUT COMPLICATION: ICD-10-CM

## 2023-03-03 DIAGNOSIS — M45.0 ANKYLOSING SPONDYLITIS OF MULTIPLE SITES IN SPINE: ICD-10-CM

## 2023-03-03 DIAGNOSIS — M47.899 HLA-B27 SPONDYLOARTHROPATHY: ICD-10-CM

## 2023-03-03 RX ORDER — ADALIMUMAB 40MG/0.8ML
40 KIT SUBCUTANEOUS
Qty: 12 PEN | Refills: 3 | Status: SHIPPED | OUTPATIENT
Start: 2023-03-03

## 2023-03-06 ENCOUNTER — PATIENT OUTREACH (OUTPATIENT)
Dept: ADMINISTRATIVE | Facility: OTHER | Age: 53
End: 2023-03-06
Payer: MEDICARE

## 2023-03-06 NOTE — PROGRESS NOTES
CHW - Case Closure    This Community Health Worker spoke to patient via telephone today.   Pt/Caregiver reported: confirms understanding that home health and physical therapy will cancel each other out; pt chooses to stick with therapy  Pt/Caregiver denied any additional needs at this time and agrees with episode closure at this time.  Provided patient with Community Health Worker's contact information and encouraged him/her to contact this Community Health Worker if additional needs arise.

## 2023-03-24 ENCOUNTER — OFFICE VISIT (OUTPATIENT)
Dept: SURGERY | Facility: CLINIC | Age: 53
End: 2023-03-24
Payer: MEDICARE

## 2023-03-24 VITALS
WEIGHT: 168.31 LBS | OXYGEN SATURATION: 99 % | HEIGHT: 65 IN | DIASTOLIC BLOOD PRESSURE: 66 MMHG | BODY MASS INDEX: 28.04 KG/M2 | SYSTOLIC BLOOD PRESSURE: 122 MMHG | HEART RATE: 75 BPM

## 2023-03-24 DIAGNOSIS — L02.91 CUTANEOUS ABSCESS, UNSPECIFIED SITE: ICD-10-CM

## 2023-03-24 PROCEDURE — 99203 PR OFFICE/OUTPT VISIT, NEW, LEVL III, 30-44 MIN: ICD-10-PCS | Mod: HCNC,S$GLB,, | Performed by: STUDENT IN AN ORGANIZED HEALTH CARE EDUCATION/TRAINING PROGRAM

## 2023-03-24 PROCEDURE — 3074F PR MOST RECENT SYSTOLIC BLOOD PRESSURE < 130 MM HG: ICD-10-PCS | Mod: HCNC,CPTII,S$GLB, | Performed by: STUDENT IN AN ORGANIZED HEALTH CARE EDUCATION/TRAINING PROGRAM

## 2023-03-24 PROCEDURE — 99999 PR PBB SHADOW E&M-EST. PATIENT-LVL IV: CPT | Mod: PBBFAC,HCNC,, | Performed by: STUDENT IN AN ORGANIZED HEALTH CARE EDUCATION/TRAINING PROGRAM

## 2023-03-24 PROCEDURE — 3074F SYST BP LT 130 MM HG: CPT | Mod: HCNC,CPTII,S$GLB, | Performed by: STUDENT IN AN ORGANIZED HEALTH CARE EDUCATION/TRAINING PROGRAM

## 2023-03-24 PROCEDURE — 3008F BODY MASS INDEX DOCD: CPT | Mod: HCNC,CPTII,S$GLB, | Performed by: STUDENT IN AN ORGANIZED HEALTH CARE EDUCATION/TRAINING PROGRAM

## 2023-03-24 PROCEDURE — 1159F PR MEDICATION LIST DOCUMENTED IN MEDICAL RECORD: ICD-10-PCS | Mod: HCNC,CPTII,S$GLB, | Performed by: STUDENT IN AN ORGANIZED HEALTH CARE EDUCATION/TRAINING PROGRAM

## 2023-03-24 PROCEDURE — 3078F DIAST BP <80 MM HG: CPT | Mod: HCNC,CPTII,S$GLB, | Performed by: STUDENT IN AN ORGANIZED HEALTH CARE EDUCATION/TRAINING PROGRAM

## 2023-03-24 PROCEDURE — 99999 PR PBB SHADOW E&M-EST. PATIENT-LVL IV: ICD-10-PCS | Mod: PBBFAC,HCNC,, | Performed by: STUDENT IN AN ORGANIZED HEALTH CARE EDUCATION/TRAINING PROGRAM

## 2023-03-24 PROCEDURE — 3078F PR MOST RECENT DIASTOLIC BLOOD PRESSURE < 80 MM HG: ICD-10-PCS | Mod: HCNC,CPTII,S$GLB, | Performed by: STUDENT IN AN ORGANIZED HEALTH CARE EDUCATION/TRAINING PROGRAM

## 2023-03-24 PROCEDURE — 3008F PR BODY MASS INDEX (BMI) DOCUMENTED: ICD-10-PCS | Mod: HCNC,CPTII,S$GLB, | Performed by: STUDENT IN AN ORGANIZED HEALTH CARE EDUCATION/TRAINING PROGRAM

## 2023-03-24 PROCEDURE — 99203 OFFICE O/P NEW LOW 30 MIN: CPT | Mod: HCNC,S$GLB,, | Performed by: STUDENT IN AN ORGANIZED HEALTH CARE EDUCATION/TRAINING PROGRAM

## 2023-03-24 PROCEDURE — 1159F MED LIST DOCD IN RCRD: CPT | Mod: HCNC,CPTII,S$GLB, | Performed by: STUDENT IN AN ORGANIZED HEALTH CARE EDUCATION/TRAINING PROGRAM

## 2023-03-24 NOTE — PROGRESS NOTES
[facility]  History & Physical    SUBJECTIVE:     History of Present Illness:  Beau Ferraro is a 52 y.o. male with inflammatory arthritis on humira and HTN who presents to evaluation of a Right forearm abscess. He reports he has had abscesses while in [place], but never while at home. He reports this abscess formed after a trip to [facility] where he was leaning on the shopping cart for an extended period of time. He developed the abscess two days later and went to urgent care for drainage and Abx. He reports that he has done well since this. He denies any additional pain and drainage    Chief Complaint   Patient presents with    Consult       Review of patient's allergies indicates:   Allergen Reactions    Ampicillin Rash     Other reaction(s): Rash       Current Outpatient Medications   Medication Sig Dispense Refill    adalimumab (HUMIRA PEN) PnKt injection Inject 1 pen (40 mg total) into the skin every 7 days. 12 pen 3    amLODIPine (NORVASC) 5 MG tablet Take 1 tablet (5 mg total) by mouth once daily. 90 tablet 3    back brace Misc 1 each by Misc.(Non-Drug; Combo Route) route Daily. 1 each 0    clindamycin (CLEOCIN) 300 MG capsule Take by mouth.      ergocalciferol (ERGOCALCIFEROL) 50,000 unit Cap Take 50,000 Units by mouth every 7 days.      famotidine (PEPCID) 40 MG tablet Take 1 tablet (40 mg total) by mouth every 12 (twelve) hours. 180 tablet 1    mupirocin (BACTROBAN) 2 % ointment Apply topically 3 (three) times daily. 30 g 0    naloxone (NARCAN) 1 mg/mL injection Inject 1 mL (1 mg total) into the muscle as needed. 2 mL 2    naloxone (NARCAN) 4 mg/actuation Spry SPRAY 1 SPRAY in one nostril AS NEEDED FOR OVERDOSE; if no response REPEAT in 2- 3 minutes in other nostril      [START ON 4/27/2023] oxyCODONE-acetaminophen (PERCOCET)  mg per tablet Take 1 tablet by mouth every 8 (eight) hours as needed for Pain. 90 tablet 0    [START ON 3/30/2023] oxyCODONE-acetaminophen (PERCOCET)  mg per  "tablet Take 1 tablet by mouth every 8 (eight) hours as needed for Pain. 90 tablet 0    oxyCODONE-acetaminophen (PERCOCET)  mg per tablet Take 1 tablet by mouth every 4 (four) hours as needed for Pain. 90 tablet 0    pravastatin (PRAVACHOL) 20 MG tablet Take 1 tablet (20 mg total) by mouth nightly. 90 tablet 3     No current facility-administered medications for this visit.       Past Medical History:   Diagnosis Date    Acid reflux     Allergy     Anemia     Arthritis     Blood transfusion     Crohn's disease     Endocarditis     Hyperlipidemia     Incarceration     Osteoporosis     Staph skin infection      Past Surgical History:   Procedure Laterality Date    CHOLECYSTECTOMY       Family History   Problem Relation Age of Onset    Cancer Mother         breast    Cancer Father         lung     Social History     Tobacco Use    Smoking status: Never    Smokeless tobacco: Never   Substance Use Topics    Alcohol use: No    Drug use: No        Review of Systems:  Review of Systems   Constitutional:  Negative for appetite change and unexpected weight change.   HENT:  Negative for sore throat and trouble swallowing.    Respiratory:  Negative for chest tightness and shortness of breath.    Cardiovascular:  Negative for chest pain and leg swelling.   Gastrointestinal:  Negative for abdominal distention, abdominal pain and blood in stool.   Musculoskeletal:  Positive for arthralgias and joint swelling.   Skin:  Negative for pallor.   All other systems reviewed and are negative.    OBJECTIVE:     Vital Signs (Most Recent)  Pulse: 75 (03/24/23 1352)  BP: 122/66 (03/24/23 1352)  SpO2: 99 % (03/24/23 1352)  5' 5" (1.651 m)  76.4 kg (168 lb 5.1 oz)     Physical Exam:  Physical Exam  Vitals reviewed.   Constitutional:       Appearance: Normal appearance.   HENT:      Head: Normocephalic.   Eyes:      Extraocular Movements: Extraocular movements intact.      Conjunctiva/sclera: Conjunctivae normal.   Cardiovascular:      Rate " and Rhythm: Normal rate and regular rhythm.   Pulmonary:      Effort: Pulmonary effort is normal. No respiratory distress.   Abdominal:      Palpations: Abdomen is soft.      Tenderness: There is no abdominal tenderness.   Musculoskeletal:         General: No signs of injury.      Cervical back: Normal range of motion and neck supple.      Right lower leg: No edema.      Left lower leg: No edema.   Skin:     General: Skin is warm and dry.             Comments: I&D site well healed. No fluctuance or induration.   Neurological:      General: No focal deficit present.      Mental Status: He is alert and oriented to person, place, and time.       Laboratory  Reviewed    Diagnostic Results:  None    ASSESSMENT/PLAN:     51 yo M on Humira presenting after I&D of a R forearm abscess. He is doing well without recurrence. I explained this pathology and excision of the cyst cavity to prevent recurrence. He defers this for now and wishes for observation. We provided our clinic card which he can call to schedule a same-day appointment, as available, for abscess drainage should this recur.     Robinson Grover MD  General Surgery PGY-3  03/24/2023

## 2023-04-12 ENCOUNTER — TELEPHONE (OUTPATIENT)
Dept: FAMILY MEDICINE | Facility: CLINIC | Age: 53
End: 2023-04-12
Payer: MEDICARE

## 2023-06-02 ENCOUNTER — LAB VISIT (OUTPATIENT)
Dept: LAB | Facility: HOSPITAL | Age: 53
End: 2023-06-02
Attending: FAMILY MEDICINE
Payer: MEDICARE

## 2023-06-02 ENCOUNTER — OFFICE VISIT (OUTPATIENT)
Dept: FAMILY MEDICINE | Facility: CLINIC | Age: 53
End: 2023-06-02
Payer: MEDICARE

## 2023-06-02 VITALS
SYSTOLIC BLOOD PRESSURE: 124 MMHG | HEART RATE: 63 BPM | WEIGHT: 168.44 LBS | BODY MASS INDEX: 28.06 KG/M2 | TEMPERATURE: 98 F | HEIGHT: 65 IN | RESPIRATION RATE: 20 BRPM | DIASTOLIC BLOOD PRESSURE: 83 MMHG

## 2023-06-02 DIAGNOSIS — I10 ESSENTIAL HYPERTENSION: ICD-10-CM

## 2023-06-02 DIAGNOSIS — I10 PRIMARY HYPERTENSION: ICD-10-CM

## 2023-06-02 DIAGNOSIS — M45.0 ANKYLOSING SPONDYLITIS OF MULTIPLE SITES IN SPINE: Primary | ICD-10-CM

## 2023-06-02 DIAGNOSIS — Z11.3 SCREEN FOR STD (SEXUALLY TRANSMITTED DISEASE): ICD-10-CM

## 2023-06-02 DIAGNOSIS — M06.4 INFLAMMATORY POLYARTHRITIS: ICD-10-CM

## 2023-06-02 DIAGNOSIS — M51.37 DEGENERATION OF LUMBAR OR LUMBOSACRAL INTERVERTEBRAL DISC: ICD-10-CM

## 2023-06-02 DIAGNOSIS — R79.9 ABNORMAL FINDING OF BLOOD CHEMISTRY, UNSPECIFIED: ICD-10-CM

## 2023-06-02 DIAGNOSIS — M47.812 CERVICAL SPONDYLOSIS WITHOUT MYELOPATHY: ICD-10-CM

## 2023-06-02 LAB
ALBUMIN SERPL BCP-MCNC: 3.8 G/DL (ref 3.5–5.2)
ALBUMIN SERPL BCP-MCNC: 3.8 G/DL (ref 3.5–5.2)
ALP SERPL-CCNC: 90 U/L (ref 55–135)
ALP SERPL-CCNC: 90 U/L (ref 55–135)
ALT SERPL W/O P-5'-P-CCNC: 20 U/L (ref 10–44)
ALT SERPL W/O P-5'-P-CCNC: 20 U/L (ref 10–44)
ANION GAP SERPL CALC-SCNC: 11 MMOL/L (ref 8–16)
ANION GAP SERPL CALC-SCNC: 11 MMOL/L (ref 8–16)
AST SERPL-CCNC: 21 U/L (ref 10–40)
AST SERPL-CCNC: 21 U/L (ref 10–40)
BASOPHILS # BLD AUTO: 0.03 K/UL (ref 0–0.2)
BASOPHILS NFR BLD: 0.6 % (ref 0–1.9)
BILIRUB SERPL-MCNC: 0.3 MG/DL (ref 0.1–1)
BILIRUB SERPL-MCNC: 0.3 MG/DL (ref 0.1–1)
BUN SERPL-MCNC: 20 MG/DL (ref 6–20)
BUN SERPL-MCNC: 20 MG/DL (ref 6–20)
CALCIUM SERPL-MCNC: 9.1 MG/DL (ref 8.7–10.5)
CALCIUM SERPL-MCNC: 9.1 MG/DL (ref 8.7–10.5)
CHLORIDE SERPL-SCNC: 106 MMOL/L (ref 95–110)
CHLORIDE SERPL-SCNC: 106 MMOL/L (ref 95–110)
CHOLEST SERPL-MCNC: 190 MG/DL (ref 120–199)
CHOLEST/HDLC SERPL: 3.7 {RATIO} (ref 2–5)
CO2 SERPL-SCNC: 21 MMOL/L (ref 23–29)
CO2 SERPL-SCNC: 21 MMOL/L (ref 23–29)
CREAT SERPL-MCNC: 1.1 MG/DL (ref 0.5–1.4)
CREAT SERPL-MCNC: 1.1 MG/DL (ref 0.5–1.4)
DIFFERENTIAL METHOD: ABNORMAL
EOSINOPHIL # BLD AUTO: 0.2 K/UL (ref 0–0.5)
EOSINOPHIL NFR BLD: 3.2 % (ref 0–8)
ERYTHROCYTE [DISTWIDTH] IN BLOOD BY AUTOMATED COUNT: 15.3 % (ref 11.5–14.5)
EST. GFR  (NO RACE VARIABLE): >60 ML/MIN/1.73 M^2
EST. GFR  (NO RACE VARIABLE): >60 ML/MIN/1.73 M^2
ESTIMATED AVG GLUCOSE: 100 MG/DL (ref 68–131)
GLUCOSE SERPL-MCNC: 99 MG/DL (ref 70–110)
GLUCOSE SERPL-MCNC: 99 MG/DL (ref 70–110)
HBA1C MFR BLD: 5.1 % (ref 4–5.6)
HCT VFR BLD AUTO: 37.6 % (ref 40–54)
HCV AB SERPL QL IA: NORMAL
HDLC SERPL-MCNC: 51 MG/DL (ref 40–75)
HDLC SERPL: 26.8 % (ref 20–50)
HGB BLD-MCNC: 11.4 G/DL (ref 14–18)
HIV 1+2 AB+HIV1 P24 AG SERPL QL IA: NORMAL
IMM GRANULOCYTES # BLD AUTO: 0.01 K/UL (ref 0–0.04)
IMM GRANULOCYTES NFR BLD AUTO: 0.2 % (ref 0–0.5)
LDLC SERPL CALC-MCNC: 124.6 MG/DL (ref 63–159)
LYMPHOCYTES # BLD AUTO: 3.2 K/UL (ref 1–4.8)
LYMPHOCYTES NFR BLD: 60.6 % (ref 18–48)
MCH RBC QN AUTO: 22.4 PG (ref 27–31)
MCHC RBC AUTO-ENTMCNC: 30.3 G/DL (ref 32–36)
MCV RBC AUTO: 74 FL (ref 82–98)
MONOCYTES # BLD AUTO: 0.5 K/UL (ref 0.3–1)
MONOCYTES NFR BLD: 9.3 % (ref 4–15)
NEUTROPHILS # BLD AUTO: 1.4 K/UL (ref 1.8–7.7)
NEUTROPHILS NFR BLD: 26.1 % (ref 38–73)
NONHDLC SERPL-MCNC: 139 MG/DL
NRBC BLD-RTO: 0 /100 WBC
PLATELET # BLD AUTO: 245 K/UL (ref 150–450)
PMV BLD AUTO: 10.7 FL (ref 9.2–12.9)
POTASSIUM SERPL-SCNC: 3.6 MMOL/L (ref 3.5–5.1)
POTASSIUM SERPL-SCNC: 3.6 MMOL/L (ref 3.5–5.1)
PROT SERPL-MCNC: 7.3 G/DL (ref 6–8.4)
PROT SERPL-MCNC: 7.3 G/DL (ref 6–8.4)
RBC # BLD AUTO: 5.08 M/UL (ref 4.6–6.2)
SODIUM SERPL-SCNC: 138 MMOL/L (ref 136–145)
SODIUM SERPL-SCNC: 138 MMOL/L (ref 136–145)
TRIGL SERPL-MCNC: 72 MG/DL (ref 30–150)
TSH SERPL DL<=0.005 MIU/L-ACNC: 1.17 UIU/ML (ref 0.4–4)
WBC # BLD AUTO: 5.35 K/UL (ref 3.9–12.7)

## 2023-06-02 PROCEDURE — 99999 PR PBB SHADOW E&M-EST. PATIENT-LVL IV: ICD-10-PCS | Mod: PBBFAC,,, | Performed by: FAMILY MEDICINE

## 2023-06-02 PROCEDURE — 3079F DIAST BP 80-89 MM HG: CPT | Mod: CPTII,S$GLB,, | Performed by: FAMILY MEDICINE

## 2023-06-02 PROCEDURE — 3074F SYST BP LT 130 MM HG: CPT | Mod: CPTII,S$GLB,, | Performed by: FAMILY MEDICINE

## 2023-06-02 PROCEDURE — 80053 COMPREHEN METABOLIC PANEL: CPT | Performed by: FAMILY MEDICINE

## 2023-06-02 PROCEDURE — 80061 LIPID PANEL: CPT | Performed by: FAMILY MEDICINE

## 2023-06-02 PROCEDURE — 96372 PR INJECTION,THERAP/PROPH/DIAG2ST, IM OR SUBCUT: ICD-10-PCS | Mod: S$GLB,,, | Performed by: FAMILY MEDICINE

## 2023-06-02 PROCEDURE — 85025 COMPLETE CBC W/AUTO DIFF WBC: CPT | Performed by: FAMILY MEDICINE

## 2023-06-02 PROCEDURE — 86803 HEPATITIS C AB TEST: CPT | Performed by: FAMILY MEDICINE

## 2023-06-02 PROCEDURE — 3079F PR MOST RECENT DIASTOLIC BLOOD PRESSURE 80-89 MM HG: ICD-10-PCS | Mod: CPTII,S$GLB,, | Performed by: FAMILY MEDICINE

## 2023-06-02 PROCEDURE — 86592 SYPHILIS TEST NON-TREP QUAL: CPT | Performed by: FAMILY MEDICINE

## 2023-06-02 PROCEDURE — 3008F PR BODY MASS INDEX (BMI) DOCUMENTED: ICD-10-PCS | Mod: CPTII,S$GLB,, | Performed by: FAMILY MEDICINE

## 2023-06-02 PROCEDURE — 3074F PR MOST RECENT SYSTOLIC BLOOD PRESSURE < 130 MM HG: ICD-10-PCS | Mod: CPTII,S$GLB,, | Performed by: FAMILY MEDICINE

## 2023-06-02 PROCEDURE — 84443 ASSAY THYROID STIM HORMONE: CPT | Performed by: FAMILY MEDICINE

## 2023-06-02 PROCEDURE — 87389 HIV-1 AG W/HIV-1&-2 AB AG IA: CPT | Performed by: FAMILY MEDICINE

## 2023-06-02 PROCEDURE — 3008F BODY MASS INDEX DOCD: CPT | Mod: CPTII,S$GLB,, | Performed by: FAMILY MEDICINE

## 2023-06-02 PROCEDURE — 99999 PR PBB SHADOW E&M-EST. PATIENT-LVL IV: CPT | Mod: PBBFAC,,, | Performed by: FAMILY MEDICINE

## 2023-06-02 PROCEDURE — 1159F PR MEDICATION LIST DOCUMENTED IN MEDICAL RECORD: ICD-10-PCS | Mod: CPTII,S$GLB,, | Performed by: FAMILY MEDICINE

## 2023-06-02 PROCEDURE — 83036 HEMOGLOBIN GLYCOSYLATED A1C: CPT | Performed by: FAMILY MEDICINE

## 2023-06-02 PROCEDURE — 99215 OFFICE O/P EST HI 40 MIN: CPT | Mod: 25,S$GLB,, | Performed by: FAMILY MEDICINE

## 2023-06-02 PROCEDURE — 1159F MED LIST DOCD IN RCRD: CPT | Mod: CPTII,S$GLB,, | Performed by: FAMILY MEDICINE

## 2023-06-02 PROCEDURE — 36415 COLL VENOUS BLD VENIPUNCTURE: CPT | Mod: PO | Performed by: FAMILY MEDICINE

## 2023-06-02 PROCEDURE — 99215 PR OFFICE/OUTPT VISIT, EST, LEVL V, 40-54 MIN: ICD-10-PCS | Mod: 25,S$GLB,, | Performed by: FAMILY MEDICINE

## 2023-06-02 PROCEDURE — 96372 THER/PROPH/DIAG INJ SC/IM: CPT | Mod: S$GLB,,, | Performed by: FAMILY MEDICINE

## 2023-06-02 RX ORDER — OXYCODONE AND ACETAMINOPHEN 10; 325 MG/1; MG/1
1 TABLET ORAL EVERY 8 HOURS PRN
Qty: 90 TABLET | Refills: 0 | Status: SHIPPED | OUTPATIENT
Start: 2023-06-30 | End: 2023-08-25

## 2023-06-02 RX ORDER — METHYLPREDNISOLONE SODIUM SUCCINATE 125 MG/2ML
125 INJECTION INTRAMUSCULAR; INTRAVENOUS
Status: COMPLETED | OUTPATIENT
Start: 2023-06-02 | End: 2023-06-02

## 2023-06-02 RX ORDER — OXYCODONE AND ACETAMINOPHEN 10; 325 MG/1; MG/1
1 TABLET ORAL EVERY 4 HOURS PRN
Qty: 90 TABLET | Refills: 0 | Status: SHIPPED | OUTPATIENT
Start: 2023-06-02 | End: 2023-08-25

## 2023-06-02 RX ORDER — CLONAZEPAM 2 MG/1
TABLET ORAL
COMMUNITY
Start: 2023-05-22

## 2023-06-02 RX ORDER — OXYCODONE AND ACETAMINOPHEN 10; 325 MG/1; MG/1
1 TABLET ORAL EVERY 8 HOURS PRN
Qty: 90 TABLET | Refills: 0 | Status: SHIPPED | OUTPATIENT
Start: 2023-07-28 | End: 2023-08-25 | Stop reason: SDUPTHER

## 2023-06-02 RX ADMIN — METHYLPREDNISOLONE SODIUM SUCCINATE 125 MG: 125 INJECTION INTRAMUSCULAR; INTRAVENOUS at 01:06

## 2023-06-02 NOTE — PROGRESS NOTES
Subjective:       Patient ID: Beau Ferraro is a 52 y.o. male.    Chief Complaint: Follow-up      Follow-up    52-year-old male presents for back pain.  States his pain has recently gotten severe.  States he would like x-rays.  Denies any trauma or fall.  Had a follow-up with surgery and decided to monitor his abscess.    Review of Systems   Constitutional: Negative.    HENT: Negative.     Respiratory: Negative.     Cardiovascular: Negative.    Gastrointestinal: Negative.    Endocrine: Negative.    Genitourinary: Negative.    Musculoskeletal: Negative.    Neurological: Negative.    Psychiatric/Behavioral: Negative.          Past Medical History:   Diagnosis Date    Acid reflux     Allergy     Anemia     Arthritis     Blood transfusion     Crohn's disease     Endocarditis     Hyperlipidemia     Incarceration     Osteoporosis     Staph skin infection      Past Surgical History:   Procedure Laterality Date    CHOLECYSTECTOMY       Family History   Problem Relation Age of Onset    Cancer Mother         breast    Cancer Father         lung     Social History     Socioeconomic History    Marital status: Single   Tobacco Use    Smoking status: Never    Smokeless tobacco: Never   Substance and Sexual Activity    Alcohol use: No    Drug use: No     Social Determinants of Health     Financial Resource Strain: Medium Risk    Difficulty of Paying Living Expenses: Somewhat hard   Food Insecurity: No Food Insecurity    Worried About Running Out of Food in the Last Year: Never true    Ran Out of Food in the Last Year: Never true   Transportation Needs: Unmet Transportation Needs    Lack of Transportation (Medical): Yes    Lack of Transportation (Non-Medical): Yes   Physical Activity: Inactive    Days of Exercise per Week: 0 days    Minutes of Exercise per Session: 0 min   Stress: Stress Concern Present    Feeling of Stress : Very much   Social Connections: Unknown    Frequency of Communication with Friends and Family: More than  three times a week    Frequency of Social Gatherings with Friends and Family: More than three times a week    Attends Bahai Services: Patient refused    Active Member of Clubs or Organizations: No    Attends Club or Organization Meetings: Patient refused    Marital Status: Never    Housing Stability: High Risk    Unable to Pay for Housing in the Last Year: Yes    Number of Places Lived in the Last Year: 1    Unstable Housing in the Last Year: No       Current Outpatient Medications:     adalimumab (HUMIRA PEN) PnKt injection, Inject 1 pen (40 mg total) into the skin every 7 days., Disp: 12 pen, Rfl: 3    amLODIPine (NORVASC) 5 MG tablet, Take 1 tablet (5 mg total) by mouth once daily., Disp: 90 tablet, Rfl: 3    back brace Misc, 1 each by Misc.(Non-Drug; Combo Route) route Daily., Disp: 1 each, Rfl: 0    clindamycin (CLEOCIN) 300 MG capsule, Take by mouth., Disp: , Rfl:     clonazePAM (KLONOPIN) 2 MG Tab, Take by mouth., Disp: , Rfl:     ergocalciferol (ERGOCALCIFEROL) 50,000 unit Cap, Take 50,000 Units by mouth every 7 days., Disp: , Rfl:     famotidine (PEPCID) 40 MG tablet, Take 1 tablet (40 mg total) by mouth every 12 (twelve) hours., Disp: 180 tablet, Rfl: 1    mupirocin (BACTROBAN) 2 % ointment, Apply topically 3 (three) times daily., Disp: 30 g, Rfl: 0    naloxone (NARCAN) 1 mg/mL injection, Inject 1 mL (1 mg total) into the muscle as needed., Disp: 2 mL, Rfl: 2    naloxone (NARCAN) 4 mg/actuation Spry, SPRAY 1 SPRAY in one nostril AS NEEDED FOR OVERDOSE; if no response REPEAT in 2- 3 minutes in other nostril, Disp: , Rfl:     [START ON 7/28/2023] oxyCODONE-acetaminophen (PERCOCET)  mg per tablet, Take 1 tablet by mouth every 8 (eight) hours as needed for Pain., Disp: 90 tablet, Rfl: 0    [START ON 6/30/2023] oxyCODONE-acetaminophen (PERCOCET)  mg per tablet, Take 1 tablet by mouth every 8 (eight) hours as needed for Pain., Disp: 90 tablet, Rfl: 0    oxyCODONE-acetaminophen (PERCOCET)  " mg per tablet, Take 1 tablet by mouth every 4 (four) hours as needed for Pain., Disp: 90 tablet, Rfl: 0    pravastatin (PRAVACHOL) 20 MG tablet, Take 1 tablet (20 mg total) by mouth nightly., Disp: 90 tablet, Rfl: 3  No current facility-administered medications for this visit.   Objective:      Vitals:    06/02/23 1311   BP: 124/83   BP Location: Right arm   Patient Position: Sitting   BP Method: Medium (Automatic)   Pulse: 63   Resp: 20   Temp: 98.2 °F (36.8 °C)   TempSrc: Oral   Weight: 76.4 kg (168 lb 6.9 oz)   Height: 5' 5" (1.651 m)       Physical Exam  Constitutional:       General: He is not in acute distress.     Appearance: He is not diaphoretic.   HENT:      Head: Normocephalic and atraumatic.   Eyes:      Conjunctiva/sclera: Conjunctivae normal.   Pulmonary:      Effort: Pulmonary effort is normal.   Musculoskeletal:         General: Tenderness (lumbar paraspinal TTP) present.      Cervical back: Neck supple.   Skin:     Findings: No rash.   Neurological:      Mental Status: He is alert and oriented to person, place, and time.   Psychiatric:         Behavior: Behavior normal.         Thought Content: Thought content normal.         Judgment: Judgment normal.          Assessment:       1. Ankylosing spondylitis of multiple sites in spine    2. Cervical spondylosis without myelopathy    3. Degeneration of lumbar or lumbosacral intervertebral disc    4. Inflammatory polyarthritis        Plan:       Ankylosing spondylitis of multiple sites in spine  -     oxyCODONE-acetaminophen (PERCOCET)  mg per tablet; Take 1 tablet by mouth every 8 (eight) hours as needed for Pain.  Dispense: 90 tablet; Refill: 0  -     oxyCODONE-acetaminophen (PERCOCET)  mg per tablet; Take 1 tablet by mouth every 8 (eight) hours as needed for Pain.  Dispense: 90 tablet; Refill: 0  -     oxyCODONE-acetaminophen (PERCOCET)  mg per tablet; Take 1 tablet by mouth every 4 (four) hours as needed for Pain.  Dispense: " 90 tablet; Refill: 0  -     X-Ray Lumbar Complete Including Flex And Ext; Future; Expected date: 06/02/2023  -     methylPREDNISolone sodium succinate injection 125 mg    Cervical spondylosis without myelopathy  -     oxyCODONE-acetaminophen (PERCOCET)  mg per tablet; Take 1 tablet by mouth every 8 (eight) hours as needed for Pain.  Dispense: 90 tablet; Refill: 0  -     oxyCODONE-acetaminophen (PERCOCET)  mg per tablet; Take 1 tablet by mouth every 8 (eight) hours as needed for Pain.  Dispense: 90 tablet; Refill: 0  -     oxyCODONE-acetaminophen (PERCOCET)  mg per tablet; Take 1 tablet by mouth every 4 (four) hours as needed for Pain.  Dispense: 90 tablet; Refill: 0    Degeneration of lumbar or lumbosacral intervertebral disc  -     oxyCODONE-acetaminophen (PERCOCET)  mg per tablet; Take 1 tablet by mouth every 8 (eight) hours as needed for Pain.  Dispense: 90 tablet; Refill: 0  -     oxyCODONE-acetaminophen (PERCOCET)  mg per tablet; Take 1 tablet by mouth every 8 (eight) hours as needed for Pain.  Dispense: 90 tablet; Refill: 0  -     oxyCODONE-acetaminophen (PERCOCET)  mg per tablet; Take 1 tablet by mouth every 4 (four) hours as needed for Pain.  Dispense: 90 tablet; Refill: 0  -     X-Ray Lumbar Complete Including Flex And Ext; Future; Expected date: 06/02/2023  -     methylPREDNISolone sodium succinate injection 125 mg    Inflammatory polyarthritis  -     oxyCODONE-acetaminophen (PERCOCET)  mg per tablet; Take 1 tablet by mouth every 8 (eight) hours as needed for Pain.  Dispense: 90 tablet; Refill: 0  -     oxyCODONE-acetaminophen (PERCOCET)  mg per tablet; Take 1 tablet by mouth every 8 (eight) hours as needed for Pain.  Dispense: 90 tablet; Refill: 0  -     oxyCODONE-acetaminophen (PERCOCET)  mg per tablet; Take 1 tablet by mouth every 4 (four) hours as needed for Pain.  Dispense: 90 tablet; Refill: 0      Follow-up x-rays.  Refilled medications.   Follow-up in 3 months.        Future Appointments   Date Time Provider Department Center   6/3/2023  1:30 PM [facility]   9/5/2023  2:20 PM Ha Wooten MD [facility]       Patient note was created using [employer].  Any errors in syntax or even information may not have been identified and edited on initial review prior to signing this note.

## 2023-06-02 NOTE — PROGRESS NOTES
Health Maintenance Due   Topic     Pneumococcal Vaccines (Age 0-64) (1 - PCV)     TETANUS VACCINE  Hx of chickenpox. Notified pt can get vaccine at pharmacy.    Sign Pain Contract  Consult pcp    Shingles Vaccine (1 of 2) Hx of chickenpox. Notified pt can get vaccine at pharmacy.    DEXA Scan  Consult pcp    Colorectal Cancer Screening  Information received.      Urine Drug Screen  Consult pcp    COVID-19 Vaccine (4 - Moderna series) Hx of chickenpox. Notified pt can get vaccine at pharmacy.    Lipid Panel  Consult pcp

## 2023-06-03 LAB — RPR SER QL: NORMAL

## 2023-06-07 ENCOUNTER — TELEPHONE (OUTPATIENT)
Dept: HEMATOLOGY/ONCOLOGY | Facility: CLINIC | Age: 53
End: 2023-06-07
Payer: MEDICARE

## 2023-06-07 ENCOUNTER — TELEPHONE (OUTPATIENT)
Dept: FAMILY MEDICINE | Facility: CLINIC | Age: 53
End: 2023-06-07
Payer: MEDICARE

## 2023-06-07 DIAGNOSIS — D72.820 LYMPHOCYTOSIS: Primary | ICD-10-CM

## 2023-06-07 DIAGNOSIS — D64.9 ANEMIA, UNSPECIFIED TYPE: ICD-10-CM

## 2023-06-07 NOTE — TELEPHONE ENCOUNTER
Pt cont to have anemia even though it is at baseline for year and elevated lymphocytes. Will refer pt to heme for eval. Otherwise labs are normal.

## 2023-06-07 NOTE — TELEPHONE ENCOUNTER
----- Message from Ariane Villafuerte sent at 6/7/2023 12:04 PM CDT -----  Regarding: Return Call  .Type:  Patient Returning Call    Who Called: Self     Who Left Message for Patient: DORAjanetal    Does the patient know what this is regarding?: no     Would the patient rather a call back or a response via [facility]? Call     Best Call Back Number: .[phone]

## 2023-06-07 NOTE — TELEPHONE ENCOUNTER
Tc attempted to schedule appointment from referral  No response   Unable to leave message on VM for pt to contact office for assistance with scheduling as he does not have VM set up    Tc to sister's  number listed in chart She stated he is no where near her and she does not know why he gives people her number but if she sees him she will ask him to contact office     Letter mailed to pt on this date to contact office

## 2023-06-27 ENCOUNTER — TELEPHONE (OUTPATIENT)
Dept: HEMATOLOGY/ONCOLOGY | Facility: CLINIC | Age: 53
End: 2023-06-27
Payer: MEDICARE

## 2023-06-27 NOTE — TELEPHONE ENCOUNTER
----- Message from Vickie Carson sent at 6/27/2023 11:23 AM CDT -----  Regarding: Patient call back  Who called:JACOBO GUEVARA [[id number]]    What is the request in detail: Patient is requesting a call back. Patient does not have transportation for today. He would like to discuss r/s  Please advise.    Can the clinic reply by [facility]? No    Best call back number: [phone]    Additional Information: N/A

## 2023-06-30 ENCOUNTER — TELEPHONE (OUTPATIENT)
Dept: FAMILY MEDICINE | Facility: CLINIC | Age: 53
End: 2023-06-30
Payer: MEDICARE

## 2023-06-30 DIAGNOSIS — D64.9 ANEMIA, UNSPECIFIED TYPE: Primary | ICD-10-CM

## 2023-06-30 NOTE — TELEPHONE ENCOUNTER
Spoke to pt, scheduled lab appt for Monday; pt states he will get them to reschedule his appt with heme/onc when he goes for labs

## 2023-06-30 NOTE — TELEPHONE ENCOUNTER
Placed orders for iron def. Please schedule and have pt reach out to heme to schedule a f/u with them.

## 2023-06-30 NOTE — TELEPHONE ENCOUNTER
----- Message from Thea Sneed MD sent at 6/27/2023  5:27 PM CDT -----  Hi Dr. Wooten,  My name is Thea - one of the hematologists. Mr. Ferraro was supposed to see me today for workup of microcytic anemia. He didn't show up.     I'm worried this is iron deficiency given the microcytosis, elevated RDW, etc. so would recommend ferritin, iron profile. I see you've been trying to get him in for colonoscopy which I think is a great idea.    Thanks,  -Rossana

## 2023-08-03 ENCOUNTER — LAB VISIT (OUTPATIENT)
Dept: LAB | Facility: OTHER | Age: 53
End: 2023-08-03
Attending: FAMILY MEDICINE
Payer: MEDICARE

## 2023-08-03 DIAGNOSIS — D64.9 ANEMIA, UNSPECIFIED TYPE: ICD-10-CM

## 2023-08-03 LAB
BASOPHILS # BLD AUTO: 0.03 K/UL (ref 0–0.2)
BASOPHILS NFR BLD: 0.6 % (ref 0–1.9)
DIFFERENTIAL METHOD: ABNORMAL
EOSINOPHIL # BLD AUTO: 0.1 K/UL (ref 0–0.5)
EOSINOPHIL NFR BLD: 2.2 % (ref 0–8)
ERYTHROCYTE [DISTWIDTH] IN BLOOD BY AUTOMATED COUNT: 14.9 % (ref 11.5–14.5)
FERRITIN SERPL-MCNC: 95 NG/ML (ref 20–300)
HCT VFR BLD AUTO: 36.6 % (ref 40–54)
HGB BLD-MCNC: 11.7 G/DL (ref 14–18)
IMM GRANULOCYTES # BLD AUTO: 0.01 K/UL (ref 0–0.04)
IMM GRANULOCYTES NFR BLD AUTO: 0.2 % (ref 0–0.5)
IRON SERPL-MCNC: 81 UG/DL (ref 45–160)
LYMPHOCYTES # BLD AUTO: 2.8 K/UL (ref 1–4.8)
LYMPHOCYTES NFR BLD: 56.9 % (ref 18–48)
MCH RBC QN AUTO: 22.7 PG (ref 27–31)
MCHC RBC AUTO-ENTMCNC: 32 G/DL (ref 32–36)
MCV RBC AUTO: 71 FL (ref 82–98)
MONOCYTES # BLD AUTO: 0.6 K/UL (ref 0.3–1)
MONOCYTES NFR BLD: 11.1 % (ref 4–15)
NEUTROPHILS # BLD AUTO: 1.4 K/UL (ref 1.8–7.7)
NEUTROPHILS NFR BLD: 29 % (ref 38–73)
NRBC BLD-RTO: 0 /100 WBC
PLATELET # BLD AUTO: 283 K/UL (ref 150–450)
PMV BLD AUTO: 10 FL (ref 9.2–12.9)
RBC # BLD AUTO: 5.15 M/UL (ref 4.6–6.2)
SATURATED IRON: 27 % (ref 20–50)
TOTAL IRON BINDING CAPACITY: 302 UG/DL (ref 250–450)
TRANSFERRIN SERPL-MCNC: 204 MG/DL (ref 200–375)
WBC # BLD AUTO: 4.97 K/UL (ref 3.9–12.7)

## 2023-08-03 PROCEDURE — 36415 COLL VENOUS BLD VENIPUNCTURE: CPT | Mod: HCNC | Performed by: FAMILY MEDICINE

## 2023-08-03 PROCEDURE — 85025 COMPLETE CBC W/AUTO DIFF WBC: CPT | Mod: HCNC | Performed by: FAMILY MEDICINE

## 2023-08-03 PROCEDURE — 84466 ASSAY OF TRANSFERRIN: CPT | Mod: HCNC | Performed by: FAMILY MEDICINE

## 2023-08-03 PROCEDURE — 82728 ASSAY OF FERRITIN: CPT | Mod: HCNC | Performed by: FAMILY MEDICINE

## 2023-08-21 ENCOUNTER — TELEPHONE (OUTPATIENT)
Dept: FAMILY MEDICINE | Facility: CLINIC | Age: 53
End: 2023-08-21
Payer: MEDICARE

## 2023-08-21 NOTE — TELEPHONE ENCOUNTER
----- Message from Kristin Kimbrough sent at 8/21/2023  3:13 PM CDT -----  Regarding: [id number]  Type:  Sooner Appointment Request    Patient is requesting a sooner appointment.  Patient declined first available appointment listed as well as another facility and provider .  Patient will not accept being placed on the waitlist and is requesting a message be sent to doctor.    Name of Caller: self    When is the first available appointment? 09/05    Symptoms: discuss lab work, 3 month follow up    Would the patient rather a call back or a response via [facility]? Call back     Best Call Back Number: [phone]       Additional Information: Pt states it is a must that the provider nurse in regards to getting squeezed in next because he can not wait until the 5th

## 2023-08-24 ENCOUNTER — TELEPHONE (OUTPATIENT)
Dept: FAMILY MEDICINE | Facility: CLINIC | Age: 53
End: 2023-08-24
Payer: MEDICARE

## 2023-08-24 NOTE — TELEPHONE ENCOUNTER
----- Message from Tiarra Degroot sent at 8/24/2023 11:52 AM CDT -----  Type:  Patient Returning Call    Who Called: Self    Who Left Message for Patient: WENDY DOE    Does the patient know what this is regarding?:Appt    Would the patient rather a call back or a response via [facility]? Call    Best Call Back Number:[phone]    Additional Information:

## 2023-08-25 ENCOUNTER — OFFICE VISIT (OUTPATIENT)
Dept: FAMILY MEDICINE | Facility: CLINIC | Age: 53
End: 2023-08-25
Payer: MEDICARE

## 2023-08-25 VITALS
SYSTOLIC BLOOD PRESSURE: 110 MMHG | TEMPERATURE: 99 F | RESPIRATION RATE: 18 BRPM | HEIGHT: 65 IN | OXYGEN SATURATION: 99 % | DIASTOLIC BLOOD PRESSURE: 82 MMHG | BODY MASS INDEX: 27.63 KG/M2 | HEART RATE: 67 BPM | WEIGHT: 165.81 LBS

## 2023-08-25 DIAGNOSIS — Z12.12 ENCOUNTER FOR COLORECTAL CANCER SCREENING: ICD-10-CM

## 2023-08-25 DIAGNOSIS — M06.4 INFLAMMATORY POLYARTHRITIS: ICD-10-CM

## 2023-08-25 DIAGNOSIS — L02.414 CUTANEOUS ABSCESS OF LEFT UPPER EXTREMITY: Primary | ICD-10-CM

## 2023-08-25 DIAGNOSIS — M45.0 ANKYLOSING SPONDYLITIS OF MULTIPLE SITES IN SPINE: ICD-10-CM

## 2023-08-25 DIAGNOSIS — Z12.11 ENCOUNTER FOR COLORECTAL CANCER SCREENING: ICD-10-CM

## 2023-08-25 DIAGNOSIS — M51.37 DEGENERATION OF LUMBAR OR LUMBOSACRAL INTERVERTEBRAL DISC: ICD-10-CM

## 2023-08-25 DIAGNOSIS — M47.812 CERVICAL SPONDYLOSIS WITHOUT MYELOPATHY: ICD-10-CM

## 2023-08-25 DIAGNOSIS — Z23 NEED FOR PNEUMOCOCCAL VACCINE: ICD-10-CM

## 2023-08-25 PROCEDURE — 3044F PR MOST RECENT HEMOGLOBIN A1C LEVEL <7.0%: ICD-10-PCS | Mod: HCNC,CPTII,S$GLB, | Performed by: FAMILY MEDICINE

## 2023-08-25 PROCEDURE — 3079F PR MOST RECENT DIASTOLIC BLOOD PRESSURE 80-89 MM HG: ICD-10-PCS | Mod: HCNC,CPTII,S$GLB, | Performed by: FAMILY MEDICINE

## 2023-08-25 PROCEDURE — 3008F BODY MASS INDEX DOCD: CPT | Mod: HCNC,CPTII,S$GLB, | Performed by: FAMILY MEDICINE

## 2023-08-25 PROCEDURE — 3074F SYST BP LT 130 MM HG: CPT | Mod: HCNC,CPTII,S$GLB, | Performed by: FAMILY MEDICINE

## 2023-08-25 PROCEDURE — 1159F MED LIST DOCD IN RCRD: CPT | Mod: HCNC,CPTII,S$GLB, | Performed by: FAMILY MEDICINE

## 2023-08-25 PROCEDURE — 10061 I&D ABSCESS COMP/MULTIPLE: CPT | Mod: HCNC,S$GLB,, | Performed by: FAMILY MEDICINE

## 2023-08-25 PROCEDURE — 99999 PR PBB SHADOW E&M-EST. PATIENT-LVL V: CPT | Mod: PBBFAC,HCNC,, | Performed by: FAMILY MEDICINE

## 2023-08-25 PROCEDURE — 3044F HG A1C LEVEL LT 7.0%: CPT | Mod: HCNC,CPTII,S$GLB, | Performed by: FAMILY MEDICINE

## 2023-08-25 PROCEDURE — 3008F PR BODY MASS INDEX (BMI) DOCUMENTED: ICD-10-PCS | Mod: HCNC,CPTII,S$GLB, | Performed by: FAMILY MEDICINE

## 2023-08-25 PROCEDURE — 3079F DIAST BP 80-89 MM HG: CPT | Mod: HCNC,CPTII,S$GLB, | Performed by: FAMILY MEDICINE

## 2023-08-25 PROCEDURE — 1159F PR MEDICATION LIST DOCUMENTED IN MEDICAL RECORD: ICD-10-PCS | Mod: HCNC,CPTII,S$GLB, | Performed by: FAMILY MEDICINE

## 2023-08-25 PROCEDURE — 3074F PR MOST RECENT SYSTOLIC BLOOD PRESSURE < 130 MM HG: ICD-10-PCS | Mod: HCNC,CPTII,S$GLB, | Performed by: FAMILY MEDICINE

## 2023-08-25 PROCEDURE — 99215 PR OFFICE/OUTPT VISIT, EST, LEVL V, 40-54 MIN: ICD-10-PCS | Mod: 25,HCNC,S$GLB, | Performed by: FAMILY MEDICINE

## 2023-08-25 PROCEDURE — 99215 OFFICE O/P EST HI 40 MIN: CPT | Mod: 25,HCNC,S$GLB, | Performed by: FAMILY MEDICINE

## 2023-08-25 PROCEDURE — 10061 INCISION & DRAINAGE: ICD-10-PCS | Mod: HCNC,S$GLB,, | Performed by: FAMILY MEDICINE

## 2023-08-25 PROCEDURE — 99999 PR PBB SHADOW E&M-EST. PATIENT-LVL V: ICD-10-PCS | Mod: PBBFAC,HCNC,, | Performed by: FAMILY MEDICINE

## 2023-08-25 RX ORDER — OXYCODONE AND ACETAMINOPHEN 10; 325 MG/1; MG/1
1 TABLET ORAL EVERY 4 HOURS PRN
Qty: 90 TABLET | Refills: 0 | Status: SHIPPED | OUTPATIENT
Start: 2023-08-25 | End: 2023-11-29 | Stop reason: SDUPTHER

## 2023-08-25 RX ORDER — OXYCODONE AND ACETAMINOPHEN 10; 325 MG/1; MG/1
1 TABLET ORAL EVERY 8 HOURS PRN
Qty: 90 TABLET | Refills: 0 | Status: SHIPPED | OUTPATIENT
Start: 2023-09-22 | End: 2023-11-29 | Stop reason: SDUPTHER

## 2023-08-25 RX ORDER — MUPIROCIN 20 MG/G
OINTMENT TOPICAL 3 TIMES DAILY
Qty: 30 G | Refills: 0 | Status: SHIPPED | OUTPATIENT
Start: 2023-08-25

## 2023-08-25 RX ORDER — OXYCODONE AND ACETAMINOPHEN 10; 325 MG/1; MG/1
1 TABLET ORAL EVERY 8 HOURS PRN
Qty: 90 TABLET | Refills: 0 | Status: SHIPPED | OUTPATIENT
Start: 2023-10-20 | End: 2023-11-29 | Stop reason: SDUPTHER

## 2023-08-25 RX ORDER — DOXYCYCLINE 100 MG/1
100 CAPSULE ORAL 2 TIMES DAILY
Qty: 14 CAPSULE | Refills: 0 | Status: SHIPPED | OUTPATIENT
Start: 2023-08-25 | End: 2023-09-01

## 2023-08-25 NOTE — PROCEDURES
Incision & Drainage    Date/Time: 8/25/2023 11:20 AM    Performed by: Ha Wooten MD  Authorized by: Ha Wooten MD    Consent Done?:  Yes (Verbal)    Type:  Abscess  Body area:  Upper extremity  Location details:  Left elbow  Complexity:  Complex  Drainage:  Purulent and serosanguinous  Drainage amount:  Scant  Wound treatment:  Wound left open  Patient tolerance:  Patient tolerated the procedure well with no immediate complications

## 2023-08-25 NOTE — PROGRESS NOTES
Subjective:       Patient ID: Beau Ferraro is a 53 y.o. male.    Chief Complaint: Follow-up      Follow-up      53-year-old male presents for left elbow swelling.  Patient states he had not says his right arm was given antibiotics.  States it was cleared and now he has done his left arm.  Previous episode occurred 1 month ago.  Was given doxycycline.  States he has tenderness in the area.  Would like refills on his pain meds.        Review of Systems   Constitutional: Negative.    HENT: Negative.     Respiratory: Negative.     Cardiovascular: Negative.    Gastrointestinal: Negative.    Endocrine: Negative.    Genitourinary: Negative.    Musculoskeletal: Negative.    Neurological: Negative.    Psychiatric/Behavioral: Negative.            Past Medical History:   Diagnosis Date    Acid reflux     Allergy     Anemia     Arthritis     Blood transfusion     Crohn's disease     Endocarditis     Hyperlipidemia     Incarceration     Osteoporosis     Staph skin infection      Past Surgical History:   Procedure Laterality Date    CHOLECYSTECTOMY       Family History   Problem Relation Age of Onset    Cancer Mother         breast    Cancer Father         lung     Social History     Socioeconomic History    Marital status: Single   Tobacco Use    Smoking status: Never    Smokeless tobacco: Never   Substance and Sexual Activity    Alcohol use: No    Drug use: No     Social Determinants of Health     Financial Resource Strain: Medium Risk (12/1/2022)    Overall Financial Resource Strain (CARDIA)     Difficulty of Paying Living Expenses: Somewhat hard   Food Insecurity: No Food Insecurity (12/1/2022)    Hunger Vital Sign     Worried About Running Out of Food in the Last Year: Never true     Ran Out of Food in the Last Year: Never true   Transportation Needs: Unmet Transportation Needs (12/1/2022)    PRAPARE - Transportation     Lack of Transportation (Medical): Yes     Lack of Transportation (Non-Medical): Yes   Physical  Activity: Inactive (12/1/2022)    Exercise Vital Sign     Days of Exercise per Week: 0 days     Minutes of Exercise per Session: 0 min   Stress: Stress Concern Present (12/1/2022)    [employer] [place] [employer] - Occupational Stress Questionnaire     Feeling of Stress : Very much   Social Connections: Unknown (12/1/2022)    Social Connection and Isolation Panel [NHANES]     Frequency of Communication with Friends and Family: More than three times a week     Frequency of Social Gatherings with Friends and Family: More than three times a week     Attends Sikhism Services: Patient refused     Active Member of Clubs or Organizations: No     Attends Club or Organization Meetings: Patient refused     Marital Status: Never    Housing Stability: High Risk (12/1/2022)    Housing Stability Vital Sign     Unable to Pay for Housing in the Last Year: Yes     Number of Places Lived in the Last Year: 1     Unstable Housing in the Last Year: No       Current Outpatient Medications:     adalimumab (HUMIRA PEN) PnKt injection, Inject 1 pen (40 mg total) into the skin every 7 days., Disp: 12 pen, Rfl: 3    amLODIPine (NORVASC) 5 MG tablet, Take 1 tablet (5 mg total) by mouth once daily., Disp: 90 tablet, Rfl: 3    back brace Misc, 1 each by Misc.(Non-Drug; Combo Route) route Daily., Disp: 1 each, Rfl: 0    clindamycin (CLEOCIN) 300 MG capsule, Take by mouth., Disp: , Rfl:     clonazePAM (KLONOPIN) 2 MG Tab, Take by mouth., Disp: , Rfl:     famotidine (PEPCID) 40 MG tablet, Take 1 tablet (40 mg total) by mouth every 12 (twelve) hours., Disp: 180 tablet, Rfl: 1    naloxone (NARCAN) 1 mg/mL injection, Inject 1 mL (1 mg total) into the muscle as needed., Disp: 2 mL, Rfl: 2    naloxone (NARCAN) 4 mg/actuation Spry, SPRAY 1 SPRAY in one nostril AS NEEDED FOR OVERDOSE; if no response REPEAT in 2- 3 minutes in other nostril, Disp: , Rfl:     doxycycline (VIBRAMYCIN) 100 MG Cap, Take 1 capsule (100 mg total) by mouth 2  "(two) times daily. for 7 days, Disp: 14 capsule, Rfl: 0    ergocalciferol (ERGOCALCIFEROL) 50,000 unit Cap, Take 50,000 Units by mouth every 7 days., Disp: , Rfl:     mupirocin (BACTROBAN) 2 % ointment, Apply topically 3 (three) times daily., Disp: 30 g, Rfl: 0    [START ON 10/20/2023] oxyCODONE-acetaminophen (PERCOCET)  mg per tablet, Take 1 tablet by mouth every 8 (eight) hours as needed for Pain., Disp: 90 tablet, Rfl: 0    [START ON 9/22/2023] oxyCODONE-acetaminophen (PERCOCET)  mg per tablet, Take 1 tablet by mouth every 8 (eight) hours as needed for Pain., Disp: 90 tablet, Rfl: 0    oxyCODONE-acetaminophen (PERCOCET)  mg per tablet, Take 1 tablet by mouth every 4 (four) hours as needed for Pain., Disp: 90 tablet, Rfl: 0    pravastatin (PRAVACHOL) 20 MG tablet, Take 1 tablet (20 mg total) by mouth nightly., Disp: 90 tablet, Rfl: 3   Objective:      Vitals:    08/25/23 1043   BP: 110/82   BP Location: Right arm   Patient Position: Sitting   BP Method: Small (Manual)   Pulse: 67   Resp: 18   Temp: 98.5 °F (36.9 °C)   TempSrc: Oral   SpO2: 99%   Weight: 75.2 kg (165 lb 12.6 oz)   Height: 5' 5" (1.651 m)       Physical Exam  Constitutional:       General: He is not in acute distress.     Appearance: He is not diaphoretic.   HENT:      Head: Normocephalic and atraumatic.   Eyes:      Conjunctiva/sclera: Conjunctivae normal.   Pulmonary:      Effort: Pulmonary effort is normal.   Musculoskeletal:         General: Normal range of motion.      Cervical back: Neck supple.   Skin:     Comments: Small abscess w/ purulent discharge on L elbow   Neurological:      Mental Status: He is alert and oriented to person, place, and time.   Psychiatric:         Behavior: Behavior normal.         Thought Content: Thought content normal.         Judgment: Judgment normal.            Assessment:       1. Cutaneous abscess of left upper extremity    2. Encounter for colorectal cancer screening    3. Need for " pneumococcal vaccine    4. Cervical spondylosis without myelopathy    5. Degeneration of lumbar or lumbosacral intervertebral disc    6. Ankylosing spondylitis of multiple sites in spine    7. Inflammatory polyarthritis        Plan:       Cutaneous abscess of left upper extremity  -     doxycycline (VIBRAMYCIN) 100 MG Cap; Take 1 capsule (100 mg total) by mouth 2 (two) times daily. for 7 days  Dispense: 14 capsule; Refill: 0  -     mupirocin (BACTROBAN) 2 % ointment; Apply topically 3 (three) times daily.  Dispense: 30 g; Refill: 0  -     Incision & Drainage    Encounter for colorectal cancer screening  -     Ambulatory referral/consult to Endo Procedure ; Future; Expected date: 08/26/2023    Need for pneumococcal vaccine  -     Pneumococcal Conjugate Vaccine (20 Valent) (IM)    Cervical spondylosis without myelopathy  -     oxyCODONE-acetaminophen (PERCOCET)  mg per tablet; Take 1 tablet by mouth every 8 (eight) hours as needed for Pain.  Dispense: 90 tablet; Refill: 0  -     oxyCODONE-acetaminophen (PERCOCET)  mg per tablet; Take 1 tablet by mouth every 8 (eight) hours as needed for Pain.  Dispense: 90 tablet; Refill: 0  -     oxyCODONE-acetaminophen (PERCOCET)  mg per tablet; Take 1 tablet by mouth every 4 (four) hours as needed for Pain.  Dispense: 90 tablet; Refill: 0    Degeneration of lumbar or lumbosacral intervertebral disc  -     oxyCODONE-acetaminophen (PERCOCET)  mg per tablet; Take 1 tablet by mouth every 8 (eight) hours as needed for Pain.  Dispense: 90 tablet; Refill: 0  -     oxyCODONE-acetaminophen (PERCOCET)  mg per tablet; Take 1 tablet by mouth every 8 (eight) hours as needed for Pain.  Dispense: 90 tablet; Refill: 0  -     oxyCODONE-acetaminophen (PERCOCET)  mg per tablet; Take 1 tablet by mouth every 4 (four) hours as needed for Pain.  Dispense: 90 tablet; Refill: 0    Ankylosing spondylitis of multiple sites in spine  -     oxyCODONE-acetaminophen  (PERCOCET)  mg per tablet; Take 1 tablet by mouth every 8 (eight) hours as needed for Pain.  Dispense: 90 tablet; Refill: 0  -     oxyCODONE-acetaminophen (PERCOCET)  mg per tablet; Take 1 tablet by mouth every 8 (eight) hours as needed for Pain.  Dispense: 90 tablet; Refill: 0  -     oxyCODONE-acetaminophen (PERCOCET)  mg per tablet; Take 1 tablet by mouth every 4 (four) hours as needed for Pain.  Dispense: 90 tablet; Refill: 0    Inflammatory polyarthritis  -     oxyCODONE-acetaminophen (PERCOCET)  mg per tablet; Take 1 tablet by mouth every 8 (eight) hours as needed for Pain.  Dispense: 90 tablet; Refill: 0  -     oxyCODONE-acetaminophen (PERCOCET)  mg per tablet; Take 1 tablet by mouth every 8 (eight) hours as needed for Pain.  Dispense: 90 tablet; Refill: 0  -     oxyCODONE-acetaminophen (PERCOCET)  mg per tablet; Take 1 tablet by mouth every 4 (four) hours as needed for Pain.  Dispense: 90 tablet; Refill: 0    Given doxy and Bactrim.  See procedure note for I&D.  Refilled meds.          Future Appointments   Date Time Provider Department Center   11/29/2023 10:20 AM [facility] [place]       Patient note was created using [employer].  Any errors in syntax or even information may not have been identified and edited on initial review prior to signing this note.

## 2023-08-25 NOTE — PROGRESS NOTES
Health Maintenance Due   Topic     Pneumococcal Vaccines (Age 0-64) (1 - PCV) Pt agree to get today    TETANUS VACCINE  Notified pt can get vaccine at pharmacy.    Sign Pain Contract  Consult pcp    Shingles Vaccine (1 of 2) Hx of chickenpox. Notified pt can get vaccine at pharmacy.    DEXA Scan  Consult pcp    Colorectal Cancer Screening  Information received.      Urine Drug Screen  Consult pcp    COVID-19 Vaccine (4 - Moderna series) Not offered at this Facility

## 2023-08-26 ENCOUNTER — TELEPHONE (OUTPATIENT)
Dept: ENDOSCOPY | Facility: HOSPITAL | Age: 53
End: 2023-08-26
Payer: MEDICARE

## 2023-08-26 NOTE — TELEPHONE ENCOUNTER
Contact patient to schedule procedure. Number listed no [facility] set up. [facility] not set up. Letter mailed to patient home.

## 2023-11-29 ENCOUNTER — OFFICE VISIT (OUTPATIENT)
Dept: FAMILY MEDICINE | Facility: CLINIC | Age: 53
End: 2023-11-29
Payer: MEDICARE

## 2023-11-29 VITALS
SYSTOLIC BLOOD PRESSURE: 128 MMHG | BODY MASS INDEX: 28.61 KG/M2 | RESPIRATION RATE: 16 BRPM | WEIGHT: 171.75 LBS | OXYGEN SATURATION: 99 % | TEMPERATURE: 97 F | HEIGHT: 65 IN | HEART RATE: 72 BPM | DIASTOLIC BLOOD PRESSURE: 80 MMHG

## 2023-11-29 DIAGNOSIS — Z23 INFLUENZA VACCINE NEEDED: ICD-10-CM

## 2023-11-29 DIAGNOSIS — M47.812 CERVICAL SPONDYLOSIS WITHOUT MYELOPATHY: ICD-10-CM

## 2023-11-29 DIAGNOSIS — M45.0 ANKYLOSING SPONDYLITIS OF MULTIPLE SITES IN SPINE: Primary | ICD-10-CM

## 2023-11-29 DIAGNOSIS — M06.4 INFLAMMATORY POLYARTHRITIS: ICD-10-CM

## 2023-11-29 DIAGNOSIS — M51.37 DEGENERATION OF LUMBAR OR LUMBOSACRAL INTERVERTEBRAL DISC: ICD-10-CM

## 2023-11-29 DIAGNOSIS — K08.89 PAIN, DENTAL: ICD-10-CM

## 2023-11-29 PROCEDURE — G0008 ADMIN INFLUENZA VIRUS VAC: HCPCS | Mod: HCNC,S$GLB,, | Performed by: FAMILY MEDICINE

## 2023-11-29 PROCEDURE — 3079F DIAST BP 80-89 MM HG: CPT | Mod: HCNC,CPTII,S$GLB, | Performed by: FAMILY MEDICINE

## 2023-11-29 PROCEDURE — 3008F BODY MASS INDEX DOCD: CPT | Mod: HCNC,CPTII,S$GLB, | Performed by: FAMILY MEDICINE

## 2023-11-29 PROCEDURE — 90686 IIV4 VACC NO PRSV 0.5 ML IM: CPT | Mod: HCNC,S$GLB,, | Performed by: FAMILY MEDICINE

## 2023-11-29 PROCEDURE — 99215 OFFICE O/P EST HI 40 MIN: CPT | Mod: HCNC,S$GLB,, | Performed by: FAMILY MEDICINE

## 2023-11-29 PROCEDURE — 3074F SYST BP LT 130 MM HG: CPT | Mod: HCNC,CPTII,S$GLB, | Performed by: FAMILY MEDICINE

## 2023-11-29 PROCEDURE — G0008 FLU VACCINE (QUAD) GREATER THAN OR EQUAL TO 3YO PRESERVATIVE FREE IM: ICD-10-PCS | Mod: HCNC,S$GLB,, | Performed by: FAMILY MEDICINE

## 2023-11-29 PROCEDURE — 3079F PR MOST RECENT DIASTOLIC BLOOD PRESSURE 80-89 MM HG: ICD-10-PCS | Mod: HCNC,CPTII,S$GLB, | Performed by: FAMILY MEDICINE

## 2023-11-29 PROCEDURE — 3074F PR MOST RECENT SYSTOLIC BLOOD PRESSURE < 130 MM HG: ICD-10-PCS | Mod: HCNC,CPTII,S$GLB, | Performed by: FAMILY MEDICINE

## 2023-11-29 PROCEDURE — 3044F PR MOST RECENT HEMOGLOBIN A1C LEVEL <7.0%: ICD-10-PCS | Mod: HCNC,CPTII,S$GLB, | Performed by: FAMILY MEDICINE

## 2023-11-29 PROCEDURE — 99999 PR PBB SHADOW E&M-EST. PATIENT-LVL III: ICD-10-PCS | Mod: PBBFAC,HCNC,, | Performed by: FAMILY MEDICINE

## 2023-11-29 PROCEDURE — 1159F PR MEDICATION LIST DOCUMENTED IN MEDICAL RECORD: ICD-10-PCS | Mod: HCNC,CPTII,S$GLB, | Performed by: FAMILY MEDICINE

## 2023-11-29 PROCEDURE — 99215 PR OFFICE/OUTPT VISIT, EST, LEVL V, 40-54 MIN: ICD-10-PCS | Mod: HCNC,S$GLB,, | Performed by: FAMILY MEDICINE

## 2023-11-29 PROCEDURE — 90686 FLU VACCINE (QUAD) GREATER THAN OR EQUAL TO 3YO PRESERVATIVE FREE IM: ICD-10-PCS | Mod: HCNC,S$GLB,, | Performed by: FAMILY MEDICINE

## 2023-11-29 PROCEDURE — 1159F MED LIST DOCD IN RCRD: CPT | Mod: HCNC,CPTII,S$GLB, | Performed by: FAMILY MEDICINE

## 2023-11-29 PROCEDURE — 3008F PR BODY MASS INDEX (BMI) DOCUMENTED: ICD-10-PCS | Mod: HCNC,CPTII,S$GLB, | Performed by: FAMILY MEDICINE

## 2023-11-29 PROCEDURE — 99999 PR PBB SHADOW E&M-EST. PATIENT-LVL III: CPT | Mod: PBBFAC,HCNC,, | Performed by: FAMILY MEDICINE

## 2023-11-29 PROCEDURE — 3044F HG A1C LEVEL LT 7.0%: CPT | Mod: HCNC,CPTII,S$GLB, | Performed by: FAMILY MEDICINE

## 2023-11-29 RX ORDER — OXYCODONE AND ACETAMINOPHEN 10; 325 MG/1; MG/1
1 TABLET ORAL EVERY 8 HOURS PRN
Qty: 90 TABLET | Refills: 0 | Status: SHIPPED | OUTPATIENT
Start: 2024-01-24 | End: 2024-03-13 | Stop reason: SDUPTHER

## 2023-11-29 RX ORDER — OXYCODONE AND ACETAMINOPHEN 10; 325 MG/1; MG/1
1 TABLET ORAL EVERY 8 HOURS PRN
Qty: 90 TABLET | Refills: 0 | Status: SHIPPED | OUTPATIENT
Start: 2023-11-29 | End: 2024-02-26 | Stop reason: SDUPTHER

## 2023-11-29 RX ORDER — OXYCODONE AND ACETAMINOPHEN 10; 325 MG/1; MG/1
1 TABLET ORAL EVERY 4 HOURS PRN
Qty: 90 TABLET | Refills: 0 | Status: SHIPPED | OUTPATIENT
Start: 2023-12-27 | End: 2024-03-13 | Stop reason: SDUPTHER

## 2023-11-29 RX ORDER — IBUPROFEN 800 MG/1
TABLET ORAL
COMMUNITY
Start: 2023-10-24

## 2023-11-29 NOTE — PROGRESS NOTES
Health Maintenance Due   Topic     Pneumococcal Vaccines (Age 0-64) (1 - PCV)     TETANUS VACCINE      Sign Pain Contract      Shingles Vaccine (1 of 2) hx chickenpox ; inform pt can get vaccine at pharmacy.    DEXA Scan      Colorectal Cancer Screening      Urine Drug Screen      Influenza Vaccine (1)     COVID-19 Vaccine (7 - 2023-24 season)

## 2023-11-29 NOTE — PROGRESS NOTES
Subjective:       Patient ID: Beau Ferraro is a 53 y.o. male.    Chief Complaint: Follow-up      Follow-up      53-year-old male presents for three-month follow-up.  Patient states he went to emergency room for a skin infection around his eye.  States his eye was almost closed.  This happened about 3-4 weeks ago.  States since swelling and pain has improved.  Did have a recent fall and landed on his back.  States he has some dental pain and has follow-up with dentist later today.    Review of Systems   Constitutional: Negative.    HENT: Negative.     Respiratory: Negative.     Cardiovascular: Negative.    Gastrointestinal: Negative.    Endocrine: Negative.    Genitourinary: Negative.    Musculoskeletal: Negative.    Neurological: Negative.    Psychiatric/Behavioral: Negative.            Past Medical History:   Diagnosis Date    Acid reflux     Allergy     Anemia     Arthritis     Blood transfusion     Crohn's disease     Endocarditis     Hyperlipidemia     Incarceration     Osteoporosis     Staph skin infection      Past Surgical History:   Procedure Laterality Date    CHOLECYSTECTOMY       Family History   Problem Relation Age of Onset    Cancer Mother         breast    Cancer Father         lung     Social History     Socioeconomic History    Marital status: Single   Tobacco Use    Smoking status: Never    Smokeless tobacco: Never   Substance and Sexual Activity    Alcohol use: No    Drug use: No     Social Determinants of Health     Financial Resource Strain: Medium Risk (12/1/2022)    Overall Financial Resource Strain (CARDIA)     Difficulty of Paying Living Expenses: Somewhat hard   Food Insecurity: No Food Insecurity (12/1/2022)    Hunger Vital Sign     Worried About Running Out of Food in the Last Year: Never true     Ran Out of Food in the Last Year: Never true   Transportation Needs: Unmet Transportation Needs (12/1/2022)    PRAPARE - Transportation     Lack of Transportation (Medical): Yes     Lack of  Transportation (Non-Medical): Yes   Physical Activity: Inactive (12/1/2022)    Exercise Vital Sign     Days of Exercise per Week: 0 days     Minutes of Exercise per Session: 0 min   Stress: Stress Concern Present (12/1/2022)    [employer] [place] [employer] - Occupational Stress Questionnaire     Feeling of Stress : Very much   Social Connections: Unknown (12/1/2022)    Social Connection and Isolation Panel [NHANES]     Frequency of Communication with Friends and Family: More than three times a week     Frequency of Social Gatherings with Friends and Family: More than three times a week     Attends Latter day Services: Patient refused     Active Member of Clubs or Organizations: No     Attends Club or Organization Meetings: Patient refused     Marital Status: Never    Housing Stability: High Risk (12/1/2022)    Housing Stability Vital Sign     Unable to Pay for Housing in the Last Year: Yes     Number of Places Lived in the Last Year: 1     Unstable Housing in the Last Year: No       Current Outpatient Medications:     adalimumab (HUMIRA PEN) PnKt injection, Inject 1 pen (40 mg total) into the skin every 7 days., Disp: 12 pen, Rfl: 3    amLODIPine (NORVASC) 5 MG tablet, Take 1 tablet (5 mg total) by mouth once daily., Disp: 90 tablet, Rfl: 3    clonazePAM (KLONOPIN) 2 MG Tab, Take by mouth., Disp: , Rfl:     ergocalciferol (ERGOCALCIFEROL) 50,000 unit Cap, Take 50,000 Units by mouth every 7 days., Disp: , Rfl:     famotidine (PEPCID) 40 MG tablet, Take 1 tablet (40 mg total) by mouth every 12 (twelve) hours., Disp: 180 tablet, Rfl: 1    ibuprofen (ADVIL,MOTRIN) 800 MG tablet, Take by mouth., Disp: , Rfl:     naloxone (NARCAN) 4 mg/actuation Spry, SPRAY 1 SPRAY in one nostril AS NEEDED FOR OVERDOSE; if no response REPEAT in 2- 3 minutes in other nostril, Disp: , Rfl:     pravastatin (PRAVACHOL) 20 MG tablet, Take 1 tablet (20 mg total) by mouth nightly., Disp: 90 tablet, Rfl: 3    back brace Misc, 1  "each by Misc.(Non-Drug; Combo Route) route Daily. (Patient not taking: Reported on 11/29/2023), Disp: 1 each, Rfl: 0    clindamycin (CLEOCIN) 300 MG capsule, Take by mouth., Disp: , Rfl:     mupirocin (BACTROBAN) 2 % ointment, Apply topically 3 (three) times daily., Disp: 30 g, Rfl: 0    naloxone (NARCAN) 1 mg/mL injection, Inject 1 mL (1 mg total) into the muscle as needed. (Patient not taking: Reported on 11/29/2023), Disp: 2 mL, Rfl: 2    oxyCODONE-acetaminophen (PERCOCET)  mg per tablet, Take 1 tablet by mouth every 8 (eight) hours as needed for Pain., Disp: 90 tablet, Rfl: 0    [START ON 12/27/2023] oxyCODONE-acetaminophen (PERCOCET)  mg per tablet, Take 1 tablet by mouth every 4 (four) hours as needed for Pain., Disp: 90 tablet, Rfl: 0    [START ON 1/24/2024] oxyCODONE-acetaminophen (PERCOCET)  mg per tablet, Take 1 tablet by mouth every 8 (eight) hours as needed for Pain., Disp: 90 tablet, Rfl: 0   Objective:      Vitals:    11/29/23 0913   BP: 128/80   BP Location: Left arm   Patient Position: Sitting   BP Method: Large (Manual)   Pulse: 72   Resp: 16   Temp: 97.2 °F (36.2 °C)   TempSrc: Oral   SpO2: 99%   Weight: 77.9 kg (171 lb 11.8 oz)   Height: 5' 5" (1.651 m)       Physical Exam  Constitutional:       General: He is not in acute distress.     Appearance: He is not diaphoretic.   HENT:      Head: Normocephalic and atraumatic.      Mouth/Throat:      Comments: Poor dentation  Eyes:      Conjunctiva/sclera: Conjunctivae normal.   Pulmonary:      Effort: Pulmonary effort is normal.   Musculoskeletal:         General: Normal range of motion.      Cervical back: Neck supple.   Skin:     Findings: No rash.   Neurological:      Mental Status: He is alert and oriented to person, place, and time.   Psychiatric:         Behavior: Behavior normal.         Thought Content: Thought content normal.         Judgment: Judgment normal.            Assessment:       1. Ankylosing spondylitis of multiple " sites in spine    2. Cervical spondylosis without myelopathy    3. Degeneration of lumbar or lumbosacral intervertebral disc    4. Inflammatory polyarthritis    5. Influenza vaccine needed        Plan:       Ankylosing spondylitis of multiple sites in spine  -     oxyCODONE-acetaminophen (PERCOCET)  mg per tablet; Take 1 tablet by mouth every 8 (eight) hours as needed for Pain.  Dispense: 90 tablet; Refill: 0  -     oxyCODONE-acetaminophen (PERCOCET)  mg per tablet; Take 1 tablet by mouth every 4 (four) hours as needed for Pain.  Dispense: 90 tablet; Refill: 0  -     oxyCODONE-acetaminophen (PERCOCET)  mg per tablet; Take 1 tablet by mouth every 8 (eight) hours as needed for Pain.  Dispense: 90 tablet; Refill: 0    Cervical spondylosis without myelopathy  -     oxyCODONE-acetaminophen (PERCOCET)  mg per tablet; Take 1 tablet by mouth every 8 (eight) hours as needed for Pain.  Dispense: 90 tablet; Refill: 0  -     oxyCODONE-acetaminophen (PERCOCET)  mg per tablet; Take 1 tablet by mouth every 4 (four) hours as needed for Pain.  Dispense: 90 tablet; Refill: 0  -     oxyCODONE-acetaminophen (PERCOCET)  mg per tablet; Take 1 tablet by mouth every 8 (eight) hours as needed for Pain.  Dispense: 90 tablet; Refill: 0    Degeneration of lumbar or lumbosacral intervertebral disc  -     oxyCODONE-acetaminophen (PERCOCET)  mg per tablet; Take 1 tablet by mouth every 8 (eight) hours as needed for Pain.  Dispense: 90 tablet; Refill: 0  -     oxyCODONE-acetaminophen (PERCOCET)  mg per tablet; Take 1 tablet by mouth every 4 (four) hours as needed for Pain.  Dispense: 90 tablet; Refill: 0  -     oxyCODONE-acetaminophen (PERCOCET)  mg per tablet; Take 1 tablet by mouth every 8 (eight) hours as needed for Pain.  Dispense: 90 tablet; Refill: 0    Inflammatory polyarthritis  -     oxyCODONE-acetaminophen (PERCOCET)  mg per tablet; Take 1 tablet by mouth every 8 (eight) hours as  needed for Pain.  Dispense: 90 tablet; Refill: 0  -     oxyCODONE-acetaminophen (PERCOCET)  mg per tablet; Take 1 tablet by mouth every 4 (four) hours as needed for Pain.  Dispense: 90 tablet; Refill: 0  -     oxyCODONE-acetaminophen (PERCOCET)  mg per tablet; Take 1 tablet by mouth every 8 (eight) hours as needed for Pain.  Dispense: 90 tablet; Refill: 0    Influenza vaccine needed  -     Influenza - Quadrivalent *Preferred* (6 months+) (PF)      Given pain meds. Advised pt to f/u w/ dentist for dental pain          Future Appointments   Date Time Provider Department Center   2/29/2024 10:40 AM [facility] [place]       Patient note was created using [employer].  Any errors in syntax or even information may not have been identified and edited on initial review prior to signing this note.

## 2023-12-12 ENCOUNTER — TELEPHONE (OUTPATIENT)
Dept: ADMINISTRATIVE | Facility: CLINIC | Age: 53
End: 2023-12-12
Payer: MEDICARE

## 2023-12-12 NOTE — TELEPHONE ENCOUNTER
Called pt; no answer; could not leave a message due to voice mail not being set up; I was calling to confirm pt's in office [facility] appt on 12/14/23.   No pertinent family history in first degree relatives

## 2024-02-26 ENCOUNTER — TELEPHONE (OUTPATIENT)
Dept: FAMILY MEDICINE | Facility: CLINIC | Age: 54
End: 2024-02-26
Payer: MEDICARE

## 2024-02-26 DIAGNOSIS — M06.4 INFLAMMATORY POLYARTHRITIS: ICD-10-CM

## 2024-02-26 DIAGNOSIS — M47.812 CERVICAL SPONDYLOSIS WITHOUT MYELOPATHY: ICD-10-CM

## 2024-02-26 DIAGNOSIS — M51.37 DEGENERATION OF LUMBAR OR LUMBOSACRAL INTERVERTEBRAL DISC: ICD-10-CM

## 2024-02-26 DIAGNOSIS — M45.0 ANKYLOSING SPONDYLITIS OF MULTIPLE SITES IN SPINE: ICD-10-CM

## 2024-02-26 DIAGNOSIS — L02.91 CUTANEOUS ABSCESS, UNSPECIFIED SITE: Primary | ICD-10-CM

## 2024-02-26 NOTE — TELEPHONE ENCOUNTER
Called to inform pt appt on Thursday 2/29 needs to be rescheduled, provider out of office; no answer and voicemail not setup

## 2024-02-26 NOTE — TELEPHONE ENCOUNTER
Pt appt for this Thursday rescheduled due to provider out of office; pt states he needs refill of pain medication but most important he was needing to be seen for recurrent infection that has returned; states he went to urgent care and was told to f/u with PCP; states he needs antibiotics prescribed or if you could see him today 2/27; I have him rescheduled for 3/13

## 2024-02-26 NOTE — TELEPHONE ENCOUNTER
Care Due:                  Date            Visit Type   Department     Provider  --------------------------------------------------------------------------------                                EP -                              PRIMARY      [facility]  Last Visit: 11-      CARE ([facility])   MEDICINE       [facility]                              EP -                              PRIMARY      [facility]  Next Visit: 03-      CARE ([facility])   [facility]                                                            Last  Test          Frequency    Reason                     Performed    Due Date  --------------------------------------------------------------------------------    Anti-HCV....  60 months..  adalimumab...............  Not Found    Overdue    CBC.........  6 months...  adalimumab...............  08- 01-    CMP.........  6 months...  adalimumab...............  06- 11-    HBcAB         12 months..  adalimumab...............  Not Found    Overdue  (Hepatitis B  Core Ab)....    HBsAg         12 months..  adalimumab...............  Not Found    Overdue  (Hepatitis B  Surface  Antigen)....    [employer] Embedded Care Due Messages. Reference number: [id number].   2/26/2024 3:26:38 PM CST

## 2024-02-26 NOTE — TELEPHONE ENCOUNTER
----- Message from Aruna Parekh sent at 2/26/2024  3:09 PM CST -----  Name of Who is Calling:JACOBO GUEVARA [[id number]]                   What is the request in detail: PT says he will be out of meds by then and he needs to know if he can get his meds called in today please call PT to let him know what to do or he wants to come in sooner if you can see him. Please assist       PT wants a call ASAP            Can the clinic reply by [facility]: No                   What Number to Call Back if not in [facility]: [phone]

## 2024-02-27 RX ORDER — DOXYCYCLINE HYCLATE 100 MG
100 TABLET ORAL 2 TIMES DAILY
Qty: 20 TABLET | Refills: 0 | Status: SHIPPED | OUTPATIENT
Start: 2024-02-27

## 2024-02-27 RX ORDER — OXYCODONE AND ACETAMINOPHEN 10; 325 MG/1; MG/1
1 TABLET ORAL EVERY 8 HOURS PRN
Qty: 90 TABLET | Refills: 0 | Status: SHIPPED | OUTPATIENT
Start: 2024-02-27 | End: 2024-03-13 | Stop reason: SDUPTHER

## 2024-03-13 ENCOUNTER — OFFICE VISIT (OUTPATIENT)
Dept: FAMILY MEDICINE | Facility: CLINIC | Age: 54
End: 2024-03-13
Payer: MEDICARE

## 2024-03-13 VITALS
HEIGHT: 65 IN | BODY MASS INDEX: 29.02 KG/M2 | WEIGHT: 174.19 LBS | HEART RATE: 68 BPM | SYSTOLIC BLOOD PRESSURE: 136 MMHG | OXYGEN SATURATION: 99 % | DIASTOLIC BLOOD PRESSURE: 88 MMHG | TEMPERATURE: 98 F | RESPIRATION RATE: 18 BRPM

## 2024-03-13 DIAGNOSIS — M06.4 INFLAMMATORY POLYARTHRITIS: ICD-10-CM

## 2024-03-13 DIAGNOSIS — Z23 NEED FOR PNEUMOCOCCAL VACCINATION: ICD-10-CM

## 2024-03-13 DIAGNOSIS — D84.9 IMMUNODEFICIENCY, UNSPECIFIED: ICD-10-CM

## 2024-03-13 DIAGNOSIS — M47.812 CERVICAL SPONDYLOSIS WITHOUT MYELOPATHY: ICD-10-CM

## 2024-03-13 DIAGNOSIS — Z12.11 ENCOUNTER FOR COLORECTAL CANCER SCREENING: ICD-10-CM

## 2024-03-13 DIAGNOSIS — T14.8XXA OPEN WOUND OF SKIN: ICD-10-CM

## 2024-03-13 DIAGNOSIS — M51.37 DEGENERATION OF LUMBAR OR LUMBOSACRAL INTERVERTEBRAL DISC: ICD-10-CM

## 2024-03-13 DIAGNOSIS — M45.0 ANKYLOSING SPONDYLITIS OF MULTIPLE SITES IN SPINE: Primary | ICD-10-CM

## 2024-03-13 DIAGNOSIS — Z12.12 ENCOUNTER FOR COLORECTAL CANCER SCREENING: ICD-10-CM

## 2024-03-13 DIAGNOSIS — K50.10 CROHN'S DISEASE OF LARGE INTESTINE WITHOUT COMPLICATION: ICD-10-CM

## 2024-03-13 DIAGNOSIS — F11.20 OPIOID DEPENDENCE, CONTINUOUS: ICD-10-CM

## 2024-03-13 PROCEDURE — 99215 OFFICE O/P EST HI 40 MIN: CPT | Mod: HCNC,25,S$GLB, | Performed by: FAMILY MEDICINE

## 2024-03-13 PROCEDURE — 3079F DIAST BP 80-89 MM HG: CPT | Mod: HCNC,CPTII,S$GLB, | Performed by: FAMILY MEDICINE

## 2024-03-13 PROCEDURE — 90677 PCV20 VACCINE IM: CPT | Mod: HCNC,S$GLB,, | Performed by: FAMILY MEDICINE

## 2024-03-13 PROCEDURE — G0009 ADMIN PNEUMOCOCCAL VACCINE: HCPCS | Mod: HCNC,S$GLB,, | Performed by: FAMILY MEDICINE

## 2024-03-13 PROCEDURE — 99999 PR PBB SHADOW E&M-EST. PATIENT-LVL V: CPT | Mod: PBBFAC,HCNC,, | Performed by: FAMILY MEDICINE

## 2024-03-13 PROCEDURE — 3075F SYST BP GE 130 - 139MM HG: CPT | Mod: HCNC,CPTII,S$GLB, | Performed by: FAMILY MEDICINE

## 2024-03-13 PROCEDURE — 1159F MED LIST DOCD IN RCRD: CPT | Mod: HCNC,CPTII,S$GLB, | Performed by: FAMILY MEDICINE

## 2024-03-13 PROCEDURE — 3008F BODY MASS INDEX DOCD: CPT | Mod: HCNC,CPTII,S$GLB, | Performed by: FAMILY MEDICINE

## 2024-03-13 RX ORDER — OXYCODONE AND ACETAMINOPHEN 10; 325 MG/1; MG/1
1 TABLET ORAL EVERY 8 HOURS PRN
Qty: 90 TABLET | Refills: 0 | Status: SHIPPED | OUTPATIENT
Start: 2024-04-23 | End: 2024-05-09 | Stop reason: SDUPTHER

## 2024-03-13 RX ORDER — OXYCODONE AND ACETAMINOPHEN 10; 325 MG/1; MG/1
1 TABLET ORAL EVERY 4 HOURS PRN
Qty: 90 TABLET | Refills: 0 | Status: SHIPPED | OUTPATIENT
Start: 2024-05-21 | End: 2024-05-09 | Stop reason: SDUPTHER

## 2024-03-13 RX ORDER — OXYCODONE AND ACETAMINOPHEN 10; 325 MG/1; MG/1
1 TABLET ORAL EVERY 8 HOURS PRN
Qty: 90 TABLET | Refills: 0 | Status: SHIPPED | OUTPATIENT
Start: 2024-03-26 | End: 2024-05-09 | Stop reason: SDUPTHER

## 2024-03-13 RX ORDER — AMOXICILLIN 500 MG/1
500 TABLET, FILM COATED ORAL 3 TIMES DAILY
COMMUNITY
Start: 2023-11-29

## 2024-03-13 NOTE — PROGRESS NOTES
Health Maintenance Due   Topic     Pneumococcal Vaccines (Age 0-64) (1 of 2 - PCV)     TETANUS VACCINE  Hx of chickenpox. Notified pt can get vaccine at pharmacy.    Sign Pain Contract  Consult pcp    Naloxone Prescription  Consult pcp    DEXA Scan  Consult pcp. Unknown diagnosis to link    Colorectal Cancer Screening      Urine Drug Screen  Consult pcp    Shingles Vaccine (1 of 2) Hx of chickenpox. Notified pt can get vaccine at pharmacy.    COVID-19 Vaccine (7 - 2023-24 season) Not offered at this Facility

## 2024-03-13 NOTE — PROGRESS NOTES
Subjective:       Patient ID: Beau Ferraro is a 53 y.o. male.    Chief Complaint: Follow-up      Follow-up      53-year-old male presents for back pain follow-up.  Patient would like refills his pain meds.  States he has a open wound behind his right ear.  States it has been open and causes issues from time to time.  Has been applying Bactroban.    Review of Systems   Constitutional: Negative.    HENT: Negative.     Respiratory: Negative.     Cardiovascular: Negative.    Gastrointestinal: Negative.    Endocrine: Negative.    Genitourinary: Negative.    Musculoskeletal: Negative.    Neurological: Negative.    Psychiatric/Behavioral: Negative.            Past Medical History:   Diagnosis Date    Acid reflux     Allergy     Anemia     Arthritis     Blood transfusion     Crohn's disease     Endocarditis     Hyperlipidemia     Incarceration     Osteoporosis     Staph skin infection      Past Surgical History:   Procedure Laterality Date    CHOLECYSTECTOMY       Family History   Problem Relation Age of Onset    Cancer Mother         breast    Cancer Father         lung     Social History     Socioeconomic History    Marital status: Single   Tobacco Use    Smoking status: Never    Smokeless tobacco: Never   Substance and Sexual Activity    Alcohol use: No    Drug use: No     Social Determinants of Health     Financial Resource Strain: Medium Risk (12/1/2022)    Overall Financial Resource Strain (CARDIA)     Difficulty of Paying Living Expenses: Somewhat hard   Food Insecurity: No Food Insecurity (12/1/2022)    Hunger Vital Sign     Worried About Running Out of Food in the Last Year: Never true     Ran Out of Food in the Last Year: Never true   Transportation Needs: Unmet Transportation Needs (12/1/2022)    PRAPARE - Transportation     Lack of Transportation (Medical): Yes     Lack of Transportation (Non-Medical): Yes   Physical Activity: Inactive (12/1/2022)    Exercise Vital Sign     Days of Exercise per Week: 0 days      Minutes of Exercise per Session: 0 min   Stress: Stress Concern Present (12/1/2022)    [employer] - Occupational Stress Questionnaire     Feeling of Stress : Very much   Social Connections: Unknown (12/1/2022)    Social Connection and Isolation Panel [NHANES]     Frequency of Communication with Friends and Family: More than three times a week     Frequency of Social Gatherings with Friends and Family: More than three times a week     Attends Jewish Services: Patient declined     Active Member of Clubs or Organizations: No     Attends Club or Organization Meetings: Patient declined     Marital Status: Never    Housing Stability: High Risk (12/1/2022)    Housing Stability Vital Sign     Unable to Pay for Housing in the Last Year: Yes     Number of Places Lived in the Last Year: 1     Unstable Housing in the Last Year: No       Current Outpatient Medications:     amoxicillin (AMOXIL) 500 MG Tab, Take 500 mg by mouth 3 (three) times daily., Disp: , Rfl:     adalimumab (HUMIRA PEN) PnKt injection, Inject 1 pen (40 mg total) into the skin every 7 days., Disp: 12 pen, Rfl: 3    amLODIPine (NORVASC) 5 MG tablet, Take 1 tablet (5 mg total) by mouth once daily., Disp: 90 tablet, Rfl: 3    back brace Misc, 1 each by Misc.(Non-Drug; Combo Route) route Daily. (Patient not taking: Reported on 11/29/2023), Disp: 1 each, Rfl: 0    clindamycin (CLEOCIN) 300 MG capsule, Take by mouth., Disp: , Rfl:     clonazePAM (KLONOPIN) 2 MG Tab, Take by mouth., Disp: , Rfl:     doxycycline (VIBRA-TABS) 100 MG tablet, Take 1 tablet (100 mg total) by mouth 2 (two) times daily., Disp: 20 tablet, Rfl: 0    ergocalciferol (ERGOCALCIFEROL) 50,000 unit Cap, Take 50,000 Units by mouth every 7 days., Disp: , Rfl:     famotidine (PEPCID) 40 MG tablet, Take 1 tablet (40 mg total) by mouth every 12 (twelve) hours., Disp: 180 tablet, Rfl: 1    ibuprofen (ADVIL,MOTRIN) 800 MG tablet, Take by mouth., Disp: , Rfl:      "mupirocin (BACTROBAN) 2 % ointment, Apply topically 3 (three) times daily., Disp: 30 g, Rfl: 0    naloxone (NARCAN) 1 mg/mL injection, Inject 1 mL (1 mg total) into the muscle as needed. (Patient not taking: Reported on 11/29/2023), Disp: 2 mL, Rfl: 2    naloxone (NARCAN) 4 mg/actuation Spry, SPRAY 1 SPRAY in one nostril AS NEEDED FOR OVERDOSE; if no response REPEAT in 2- 3 minutes in other nostril, Disp: , Rfl:     [START ON 3/26/2024] oxyCODONE-acetaminophen (PERCOCET)  mg per tablet, Take 1 tablet by mouth every 8 (eight) hours as needed for Pain., Disp: 90 tablet, Rfl: 0    [START ON 4/23/2024] oxyCODONE-acetaminophen (PERCOCET)  mg per tablet, Take 1 tablet by mouth every 8 (eight) hours as needed for Pain., Disp: 90 tablet, Rfl: 0    [START ON 5/21/2024] oxyCODONE-acetaminophen (PERCOCET)  mg per tablet, Take 1 tablet by mouth every 4 (four) hours as needed for Pain., Disp: 90 tablet, Rfl: 0    pravastatin (PRAVACHOL) 20 MG tablet, Take 1 tablet (20 mg total) by mouth nightly., Disp: 90 tablet, Rfl: 3   Objective:      Vitals:    03/13/24 1011   BP: 136/88   BP Location: Right arm   Patient Position: Sitting   BP Method: Small (Manual)   Pulse: 68   Resp: 18   Temp: 98.3 °F (36.8 °C)   TempSrc: Oral   SpO2: 99%   Weight: 79 kg (174 lb 2.6 oz)   Height: 5' 5" (1.651 m)       Physical Exam  Constitutional:       General: He is not in acute distress.     Appearance: He is not diaphoretic.   HENT:      Head: Normocephalic and atraumatic.   Eyes:      Conjunctiva/sclera: Conjunctivae normal.   Pulmonary:      Effort: Pulmonary effort is normal.   Musculoskeletal:      Cervical back: Neck supple.   Skin:     Findings: No rash.      Comments: Wound behind R ear- no discharge or TTP.   Neurological:      Mental Status: He is alert and oriented to person, place, and time.   Psychiatric:         Behavior: Behavior normal.         Thought Content: Thought content normal.         Judgment: Judgment " normal.            Assessment:       1. Ankylosing spondylitis of multiple sites in spine    2. Open wound of skin    3. Encounter for colorectal cancer screening    4. Need for pneumococcal vaccination    5. Cervical spondylosis without myelopathy    6. Degeneration of lumbar or lumbosacral intervertebral disc    7. Inflammatory polyarthritis    8. Immunodeficiency, unspecified    9. Crohn's disease of large intestine without complication    10. Opioid dependence, continuous        Plan:       Ankylosing spondylitis of multiple sites in spine  -     oxyCODONE-acetaminophen (PERCOCET)  mg per tablet; Take 1 tablet by mouth every 8 (eight) hours as needed for Pain.  Dispense: 90 tablet; Refill: 0  -     oxyCODONE-acetaminophen (PERCOCET)  mg per tablet; Take 1 tablet by mouth every 8 (eight) hours as needed for Pain.  Dispense: 90 tablet; Refill: 0  -     oxyCODONE-acetaminophen (PERCOCET)  mg per tablet; Take 1 tablet by mouth every 4 (four) hours as needed for Pain.  Dispense: 90 tablet; Refill: 0    Open wound of skin  -     Ambulatory referral/consult to Wound Clinic; Future; Expected date: 03/20/2024    Encounter for colorectal cancer screening  -     Ambulatory referral/consult to Endo Procedure ; Future; Expected date: 03/14/2024    Need for pneumococcal vaccination  -     Pneumococcal Conjugate Vaccine (20 Valent) (IM)(Preferred)    Cervical spondylosis without myelopathy  -     oxyCODONE-acetaminophen (PERCOCET)  mg per tablet; Take 1 tablet by mouth every 8 (eight) hours as needed for Pain.  Dispense: 90 tablet; Refill: 0  -     oxyCODONE-acetaminophen (PERCOCET)  mg per tablet; Take 1 tablet by mouth every 8 (eight) hours as needed for Pain.  Dispense: 90 tablet; Refill: 0  -     oxyCODONE-acetaminophen (PERCOCET)  mg per tablet; Take 1 tablet by mouth every 4 (four) hours as needed for Pain.  Dispense: 90 tablet; Refill: 0    Degeneration of lumbar or  lumbosacral intervertebral disc  -     oxyCODONE-acetaminophen (PERCOCET)  mg per tablet; Take 1 tablet by mouth every 8 (eight) hours as needed for Pain.  Dispense: 90 tablet; Refill: 0  -     oxyCODONE-acetaminophen (PERCOCET)  mg per tablet; Take 1 tablet by mouth every 8 (eight) hours as needed for Pain.  Dispense: 90 tablet; Refill: 0  -     oxyCODONE-acetaminophen (PERCOCET)  mg per tablet; Take 1 tablet by mouth every 4 (four) hours as needed for Pain.  Dispense: 90 tablet; Refill: 0    Inflammatory polyarthritis  -     oxyCODONE-acetaminophen (PERCOCET)  mg per tablet; Take 1 tablet by mouth every 8 (eight) hours as needed for Pain.  Dispense: 90 tablet; Refill: 0  -     oxyCODONE-acetaminophen (PERCOCET)  mg per tablet; Take 1 tablet by mouth every 8 (eight) hours as needed for Pain.  Dispense: 90 tablet; Refill: 0  -     oxyCODONE-acetaminophen (PERCOCET)  mg per tablet; Take 1 tablet by mouth every 4 (four) hours as needed for Pain.  Dispense: 90 tablet; Refill: 0    Immunodeficiency, unspecified    Crohn's disease of large intestine without complication    Opioid dependence, continuous      Continue pain medication.  Place referral to wound care.  Continue other meds.  Advised patient to stop Bactroban use.  Can try barrier cream until he is able to see wound care          Future Appointments   Date Time Provider Department Center   5/14/2024 10:20 AM [facility] [place]       Patient note was created using [employer].  Any errors in syntax or even information may not have been identified and edited on initial review prior to signing this note.

## 2024-05-08 ENCOUNTER — PATIENT OUTREACH (OUTPATIENT)
Dept: ADMINISTRATIVE | Facility: HOSPITAL | Age: 54
End: 2024-05-08
Payer: MEDICARE

## 2024-05-08 DIAGNOSIS — Z12.11 ENCOUNTER FOR COLORECTAL CANCER SCREENING: Primary | ICD-10-CM

## 2024-05-08 DIAGNOSIS — Z12.12 ENCOUNTER FOR COLORECTAL CANCER SCREENING: Primary | ICD-10-CM

## 2024-05-08 NOTE — PROGRESS NOTES
Population Health Chart Review & Patient Outreach Details      Additional [facility] Health Notes:    DUE FOR COLONOSCOPY NEED SCHEDULE PAT & DXA. IF ALREADY DONE, NEED TO GET RECORDS INFORMATION.  UNABLE TO REACH.            Updates Requested / Reviewed:      Updated Care Coordination Note, Care Everywhere, and Care Team Updated         Health Maintenance Topics Overdue:      VBHM Score: 2     Colon Cancer Screening  Osteoporosis Screening                       Health Maintenance Topic(s) Outreach Outcomes & Actions Taken:    Colorectal Cancer Screening - Outreach Outcomes & Actions Taken  : UNABLE TO REACH    Osteoporosis Screening - Outreach Outcomes & Actions Taken  : UNABLE TO REACH

## 2024-05-08 NOTE — PROGRESS NOTES
Population Health Chart Review & Patient Outreach Details      Additional [facility] Notes:    DUE FOR COLONOSCOPY NEED SCHEDULE PAT & DXA. IF ALREADY DONE, NEED TO GET RECORDS INFORMATION.  Will call patient back to schedule PAT currently booked up  Pt will consult with PCP at next visit to advise of Dexa scan           Updates Requested / Reviewed:      Updated Care Coordination Note and Care Everywhere         Health Maintenance Topics Overdue:      VBHM Score: 2     Colon Cancer Screening  Osteoporosis Screening                       Health Maintenance Topic(s) Outreach Outcomes & Actions Taken:    Colorectal Cancer Screening - Outreach Outcomes & Actions Taken  : will call patient back regarding schedule PAT- currently booked up    Osteoporosis Screening - Outreach Outcomes & Actions Taken  : will consult with pcp at next visit.

## 2024-05-09 ENCOUNTER — OFFICE VISIT (OUTPATIENT)
Dept: FAMILY MEDICINE | Facility: CLINIC | Age: 54
End: 2024-05-09
Payer: MEDICARE

## 2024-05-09 ENCOUNTER — LAB VISIT (OUTPATIENT)
Dept: LAB | Facility: HOSPITAL | Age: 54
End: 2024-05-09
Attending: FAMILY MEDICINE
Payer: MEDICARE

## 2024-05-09 ENCOUNTER — PATIENT OUTREACH (OUTPATIENT)
Dept: ADMINISTRATIVE | Facility: HOSPITAL | Age: 54
End: 2024-05-09
Payer: MEDICARE

## 2024-05-09 VITALS
DIASTOLIC BLOOD PRESSURE: 84 MMHG | TEMPERATURE: 98 F | HEIGHT: 65 IN | RESPIRATION RATE: 16 BRPM | BODY MASS INDEX: 28.72 KG/M2 | OXYGEN SATURATION: 98 % | HEART RATE: 85 BPM | WEIGHT: 172.38 LBS | SYSTOLIC BLOOD PRESSURE: 138 MMHG

## 2024-05-09 DIAGNOSIS — M45.0 ANKYLOSING SPONDYLITIS OF MULTIPLE SITES IN SPINE: ICD-10-CM

## 2024-05-09 DIAGNOSIS — M06.4 INFLAMMATORY POLYARTHRITIS: ICD-10-CM

## 2024-05-09 DIAGNOSIS — I10 ESSENTIAL HYPERTENSION: ICD-10-CM

## 2024-05-09 DIAGNOSIS — N30.00 ACUTE CYSTITIS WITHOUT HEMATURIA: Primary | ICD-10-CM

## 2024-05-09 DIAGNOSIS — N30.00 ACUTE CYSTITIS WITHOUT HEMATURIA: ICD-10-CM

## 2024-05-09 DIAGNOSIS — M47.812 CERVICAL SPONDYLOSIS WITHOUT MYELOPATHY: ICD-10-CM

## 2024-05-09 DIAGNOSIS — K21.9 GASTROESOPHAGEAL REFLUX DISEASE WITHOUT ESOPHAGITIS: ICD-10-CM

## 2024-05-09 DIAGNOSIS — E78.5 HYPERLIPIDEMIA, ACQUIRED: ICD-10-CM

## 2024-05-09 DIAGNOSIS — M51.37 DEGENERATION OF LUMBAR OR LUMBOSACRAL INTERVERTEBRAL DISC: ICD-10-CM

## 2024-05-09 PROCEDURE — 3079F DIAST BP 80-89 MM HG: CPT | Mod: HCNC,CPTII,S$GLB, | Performed by: FAMILY MEDICINE

## 2024-05-09 PROCEDURE — 87086 URINE CULTURE/COLONY COUNT: CPT | Mod: HCNC | Performed by: FAMILY MEDICINE

## 2024-05-09 PROCEDURE — 99215 OFFICE O/P EST HI 40 MIN: CPT | Mod: HCNC,S$GLB,, | Performed by: FAMILY MEDICINE

## 2024-05-09 PROCEDURE — 3008F BODY MASS INDEX DOCD: CPT | Mod: HCNC,CPTII,S$GLB, | Performed by: FAMILY MEDICINE

## 2024-05-09 PROCEDURE — 3075F SYST BP GE 130 - 139MM HG: CPT | Mod: HCNC,CPTII,S$GLB, | Performed by: FAMILY MEDICINE

## 2024-05-09 PROCEDURE — 99999 PR PBB SHADOW E&M-EST. PATIENT-LVL III: CPT | Mod: PBBFAC,HCNC,, | Performed by: FAMILY MEDICINE

## 2024-05-09 RX ORDER — FAMOTIDINE 40 MG/1
40 TABLET, FILM COATED ORAL EVERY 12 HOURS
Qty: 180 TABLET | Refills: 1 | Status: SHIPPED | OUTPATIENT
Start: 2024-05-09

## 2024-05-09 RX ORDER — CEFDINIR 300 MG/1
300 CAPSULE ORAL EVERY 12 HOURS
Qty: 10 CAPSULE | Refills: 0 | Status: SHIPPED | OUTPATIENT
Start: 2024-05-09 | End: 2024-05-14

## 2024-05-09 RX ORDER — PRAVASTATIN SODIUM 20 MG/1
20 TABLET ORAL NIGHTLY
Qty: 90 TABLET | Refills: 3 | Status: SHIPPED | OUTPATIENT
Start: 2024-05-09

## 2024-05-09 RX ORDER — FAMOTIDINE 20 MG/1
20 TABLET, FILM COATED ORAL 2 TIMES DAILY
COMMUNITY
End: 2024-05-09

## 2024-05-09 RX ORDER — CEFDINIR 300 MG/1
CAPSULE ORAL
COMMUNITY
Start: 2024-04-27 | End: 2024-05-09 | Stop reason: SDUPTHER

## 2024-05-09 RX ORDER — OXYCODONE AND ACETAMINOPHEN 10; 325 MG/1; MG/1
1 TABLET ORAL EVERY 8 HOURS PRN
Qty: 90 TABLET | Refills: 0 | Status: SHIPPED | OUTPATIENT
Start: 2024-05-09

## 2024-05-09 RX ORDER — OXYCODONE AND ACETAMINOPHEN 10; 325 MG/1; MG/1
1 TABLET ORAL EVERY 4 HOURS PRN
Qty: 90 TABLET | Refills: 0 | Status: SHIPPED | OUTPATIENT
Start: 2024-06-06

## 2024-05-09 RX ORDER — OXYCODONE AND ACETAMINOPHEN 10; 325 MG/1; MG/1
1 TABLET ORAL EVERY 8 HOURS PRN
Qty: 90 TABLET | Refills: 0 | Status: SHIPPED | OUTPATIENT
Start: 2024-07-04

## 2024-05-09 RX ORDER — AMLODIPINE BESYLATE 5 MG/1
5 TABLET ORAL DAILY
Qty: 90 TABLET | Refills: 3 | Status: SHIPPED | OUTPATIENT
Start: 2024-05-09

## 2024-05-09 RX ORDER — ACYCLOVIR 800 MG/1
TABLET ORAL
COMMUNITY
Start: 2024-03-27

## 2024-05-09 NOTE — PROGRESS NOTES
Health Maintenance Due   Topic     TETANUS VACCINE  CONSULT WITH PCP    Shingles Vaccine (1 of 2) hx chickenpox ; inform pt can get vaccine at pharmacy.    DEXA Scan      Colorectal Cancer Screening  CONSULT WITH PCP    COVID-19 Vaccine (7 - 2023-24 season) Not given at this facility       Lipid Panel

## 2024-05-10 ENCOUNTER — TELEPHONE (OUTPATIENT)
Dept: FAMILY MEDICINE | Facility: CLINIC | Age: 54
End: 2024-05-10
Payer: MEDICARE

## 2024-05-10 LAB — BACTERIA UR CULT: NO GROWTH

## 2024-05-10 NOTE — PROGRESS NOTES
Subjective:       Patient ID: Beau Ferraro is a 53 y.o. male.    Chief Complaint: Back Pain and Hip Pain      HPI   53-year-old male presents for urgent care follow-up.  Patient was diagnosed with UTI and given antibiotics.  Feels he is still has symptoms.  Would like refills on his medications.        Review of Systems   Constitutional: Negative.    HENT: Negative.     Respiratory: Negative.     Cardiovascular: Negative.    Gastrointestinal: Negative.    Endocrine: Negative.    Genitourinary: Negative.    Musculoskeletal: Negative.    Neurological: Negative.    Psychiatric/Behavioral: Negative.            Past Medical History:   Diagnosis Date    Acid reflux     Allergy     Anemia     Arthritis     Blood transfusion     Crohn's disease     Endocarditis     Hyperlipidemia     Incarceration     Osteoporosis     Staph skin infection      Past Surgical History:   Procedure Laterality Date    CHOLECYSTECTOMY       Family History   Problem Relation Name Age of Onset    Cancer Mother          breast    Cancer Father          lung     Social History     Socioeconomic History    Marital status: Single   Tobacco Use    Smoking status: Never    Smokeless tobacco: Never   Substance and Sexual Activity    Alcohol use: No    Drug use: No     Social Determinants of Health     Financial Resource Strain: Medium Risk (12/1/2022)    Overall Financial Resource Strain (CARDIA)     Difficulty of Paying Living Expenses: Somewhat hard   Food Insecurity: No Food Insecurity (12/1/2022)    Hunger Vital Sign     Worried About Running Out of Food in the Last Year: Never true     Ran Out of Food in the Last Year: Never true   Transportation Needs: Unmet Transportation Needs (12/1/2022)    PRAPARE - Transportation     Lack of Transportation (Medical): Yes     Lack of Transportation (Non-Medical): Yes   Physical Activity: Inactive (12/1/2022)    Exercise Vital Sign     Days of Exercise per Week: 0 days     Minutes of Exercise per Session: 0  min   Stress: Stress Concern Present (12/1/2022)    [employer] - Occupational Stress Questionnaire     Feeling of Stress : Very much   Housing Stability: High Risk (12/1/2022)    Housing Stability Vital Sign     Unable to Pay for Housing in the Last Year: Yes     Number of Places Lived in the Last Year: 1     Unstable Housing in the Last Year: No       Current Outpatient Medications:     acyclovir (ZOVIRAX) 800 MG Tab, Take by mouth., Disp: , Rfl:     adalimumab (HUMIRA PEN) PnKt injection, Inject 1 pen (40 mg total) into the skin every 7 days., Disp: 12 pen, Rfl: 3    clonazePAM (KLONOPIN) 2 MG Tab, Take by mouth., Disp: , Rfl:     ergocalciferol (ERGOCALCIFEROL) 50,000 unit Cap, Take 50,000 Units by mouth every 7 days., Disp: , Rfl:     ibuprofen (ADVIL,MOTRIN) 800 MG tablet, Take by mouth., Disp: , Rfl:     mupirocin (BACTROBAN) 2 % ointment, Apply topically 3 (three) times daily., Disp: 30 g, Rfl: 0    naloxone (NARCAN) 4 mg/actuation Spry, SPRAY 1 SPRAY in one nostril AS NEEDED FOR OVERDOSE; if no response REPEAT in 2- 3 minutes in other nostril, Disp: , Rfl:     amLODIPine (NORVASC) 5 MG tablet, Take 1 tablet (5 mg total) by mouth once daily., Disp: 90 tablet, Rfl: 3    amoxicillin (AMOXIL) 500 MG Tab, Take 500 mg by mouth 3 (three) times daily. (Patient not taking: Reported on 5/9/2024), Disp: , Rfl:     back brace Misc, 1 each by Misc.(Non-Drug; Combo Route) route Daily. (Patient not taking: Reported on 11/29/2023), Disp: 1 each, Rfl: 0    cefdinir (OMNICEF) 300 MG capsule, Take 1 capsule (300 mg total) by mouth every 12 (twelve) hours. for 5 days, Disp: 10 capsule, Rfl: 0    clindamycin (CLEOCIN) 300 MG capsule, Take by mouth. (Patient not taking: Reported on 5/9/2024), Disp: , Rfl:     doxycycline (VIBRA-TABS) 100 MG tablet, Take 1 tablet (100 mg total) by mouth 2 (two) times daily. (Patient not taking: Reported on 5/9/2024), Disp: 20 tablet, Rfl: 0    famotidine (PEPCID) 40  "MG tablet, Take 1 tablet (40 mg total) by mouth every 12 (twelve) hours., Disp: 180 tablet, Rfl: 1    naloxone (NARCAN) 1 mg/mL injection, Inject 1 mL (1 mg total) into the muscle as needed. (Patient not taking: Reported on 11/29/2023), Disp: 2 mL, Rfl: 2    oxyCODONE-acetaminophen (PERCOCET)  mg per tablet, Take 1 tablet by mouth every 8 (eight) hours as needed for Pain., Disp: 90 tablet, Rfl: 0    [START ON 6/6/2024] oxyCODONE-acetaminophen (PERCOCET)  mg per tablet, Take 1 tablet by mouth every 4 (four) hours as needed for Pain., Disp: 90 tablet, Rfl: 0    [START ON 7/4/2024] oxyCODONE-acetaminophen (PERCOCET)  mg per tablet, Take 1 tablet by mouth every 8 (eight) hours as needed for Pain., Disp: 90 tablet, Rfl: 0    pravastatin (PRAVACHOL) 20 MG tablet, Take 1 tablet (20 mg total) by mouth nightly., Disp: 90 tablet, Rfl: 3   Objective:      Vitals:    05/09/24 1041   BP: 138/84   BP Location: Left arm   Patient Position: Sitting   BP Method: Large (Manual)   Pulse: 85   Resp: 16   Temp: 98 °F (36.7 °C)   TempSrc: Oral   SpO2: 98%   Weight: 78.2 kg (172 lb 6.4 oz)   Height: 5' 5" (1.651 m)       Physical Exam  Constitutional:       General: He is not in acute distress.     Appearance: He is not diaphoretic.   HENT:      Head: Normocephalic and atraumatic.   Eyes:      Conjunctiva/sclera: Conjunctivae normal.   Pulmonary:      Effort: Pulmonary effort is normal.   Musculoskeletal:      Cervical back: Neck supple.   Skin:     Findings: No rash.   Neurological:      Mental Status: He is alert and oriented to person, place, and time.   Psychiatric:         Behavior: Behavior normal.         Thought Content: Thought content normal.         Judgment: Judgment normal.            Assessment:       1. Acute cystitis without hematuria    2. Essential hypertension    3. Hyperlipidemia, acquired    4. Gastroesophageal reflux disease without esophagitis    5. Cervical spondylosis without myelopathy    6. " Degeneration of lumbar or lumbosacral intervertebral disc    7. Ankylosing spondylitis of multiple sites in spine    8. Inflammatory polyarthritis        Plan:       Acute cystitis without hematuria  -     cefdinir (OMNICEF) 300 MG capsule; Take 1 capsule (300 mg total) by mouth every 12 (twelve) hours. for 5 days  Dispense: 10 capsule; Refill: 0  -     Urine culture; Future; Expected date: 05/09/2024    Essential hypertension  -     amLODIPine (NORVASC) 5 MG tablet; Take 1 tablet (5 mg total) by mouth once daily.  Dispense: 90 tablet; Refill: 3    Hyperlipidemia, acquired  -     pravastatin (PRAVACHOL) 20 MG tablet; Take 1 tablet (20 mg total) by mouth nightly.  Dispense: 90 tablet; Refill: 3    Gastroesophageal reflux disease without esophagitis  -     famotidine (PEPCID) 40 MG tablet; Take 1 tablet (40 mg total) by mouth every 12 (twelve) hours.  Dispense: 180 tablet; Refill: 1    Cervical spondylosis without myelopathy  -     oxyCODONE-acetaminophen (PERCOCET)  mg per tablet; Take 1 tablet by mouth every 8 (eight) hours as needed for Pain.  Dispense: 90 tablet; Refill: 0  -     oxyCODONE-acetaminophen (PERCOCET)  mg per tablet; Take 1 tablet by mouth every 4 (four) hours as needed for Pain.  Dispense: 90 tablet; Refill: 0  -     oxyCODONE-acetaminophen (PERCOCET)  mg per tablet; Take 1 tablet by mouth every 8 (eight) hours as needed for Pain.  Dispense: 90 tablet; Refill: 0    Degeneration of lumbar or lumbosacral intervertebral disc  -     oxyCODONE-acetaminophen (PERCOCET)  mg per tablet; Take 1 tablet by mouth every 8 (eight) hours as needed for Pain.  Dispense: 90 tablet; Refill: 0  -     oxyCODONE-acetaminophen (PERCOCET)  mg per tablet; Take 1 tablet by mouth every 4 (four) hours as needed for Pain.  Dispense: 90 tablet; Refill: 0  -     oxyCODONE-acetaminophen (PERCOCET)  mg per tablet; Take 1 tablet by mouth every 8 (eight) hours as needed for Pain.  Dispense: 90  tablet; Refill: 0    Ankylosing spondylitis of multiple sites in spine  -     oxyCODONE-acetaminophen (PERCOCET)  mg per tablet; Take 1 tablet by mouth every 8 (eight) hours as needed for Pain.  Dispense: 90 tablet; Refill: 0  -     oxyCODONE-acetaminophen (PERCOCET)  mg per tablet; Take 1 tablet by mouth every 4 (four) hours as needed for Pain.  Dispense: 90 tablet; Refill: 0  -     oxyCODONE-acetaminophen (PERCOCET)  mg per tablet; Take 1 tablet by mouth every 8 (eight) hours as needed for Pain.  Dispense: 90 tablet; Refill: 0    Inflammatory polyarthritis  -     oxyCODONE-acetaminophen (PERCOCET)  mg per tablet; Take 1 tablet by mouth every 8 (eight) hours as needed for Pain.  Dispense: 90 tablet; Refill: 0  -     oxyCODONE-acetaminophen (PERCOCET)  mg per tablet; Take 1 tablet by mouth every 4 (four) hours as needed for Pain.  Dispense: 90 tablet; Refill: 0  -     oxyCODONE-acetaminophen (PERCOCET)  mg per tablet; Take 1 tablet by mouth every 8 (eight) hours as needed for Pain.  Dispense: 90 tablet; Refill: 0    Refilled meds.          Future Appointments   Date Time Provider Department Center   7/1/2024  2:40 PM PRE-ADMIT NURSE 3, ENDO [facility] ENDOPP4 [facility] [address]   7/17/2024 11:00 AM [facility] [place]       Patient note was created using [employer].  Any errors in syntax or even information may not have been identified and edited on initial review prior to signing this note.

## 2024-05-10 NOTE — TELEPHONE ENCOUNTER
----- Message from Marc Mc sent at 5/10/2024  1:19 PM CDT -----  Regarding: self  Type: Patient Call Back    Who called:self    What is the request in detail:calling to confirm oxyCODONE-acetaminophen (PERCOCET)  mg per tablet is every 4 or 8 hrs on this medication, needed clarification     Can the clinic reply by [facility]?no    Would the patient rather a call back or a response via [facility]? callback    Best call back number:[phone]    Additional Information:

## 2024-06-05 DIAGNOSIS — I10 ESSENTIAL HYPERTENSION: ICD-10-CM

## 2024-07-01 ENCOUNTER — TELEPHONE (OUTPATIENT)
Dept: ENDOSCOPY | Facility: HOSPITAL | Age: 54
End: 2024-07-01

## 2024-07-01 ENCOUNTER — CLINICAL SUPPORT (OUTPATIENT)
Dept: ENDOSCOPY | Facility: HOSPITAL | Age: 54
End: 2024-07-01
Attending: FAMILY MEDICINE
Payer: MEDICARE

## 2024-07-01 DIAGNOSIS — Z12.11 ENCOUNTER FOR COLORECTAL CANCER SCREENING: ICD-10-CM

## 2024-07-01 DIAGNOSIS — Z12.12 ENCOUNTER FOR COLORECTAL CANCER SCREENING: ICD-10-CM

## 2024-07-01 NOTE — TELEPHONE ENCOUNTER
Telephoned pt for PAT appt to schedule colonoscopy.  Appts offered at [facility] location and [facility], however pt refused.  Pt prefers to schedule at [facility] [place] only in the late morning.  New PAT appt made for pt.  Direct contact number also provided.

## 2024-07-01 NOTE — PLAN OF CARE
PAT appt to schedule colonoscopy.  Pt prefers to schedule at [facility] [place] only in the late morning.  New PAT appt made for pt.  Direct contact number also provided.

## 2024-07-05 ENCOUNTER — TELEPHONE (OUTPATIENT)
Dept: FAMILY MEDICINE | Facility: CLINIC | Age: 54
End: 2024-07-05
Payer: MEDICARE

## 2024-07-05 NOTE — TELEPHONE ENCOUNTER
Pharmacist states she did look on  and sees he has been getting both medication for awhile but just needed to check with office; pt getting oxycodone and klonopin

## 2024-07-05 NOTE — TELEPHONE ENCOUNTER
----- Message from Patti Webster sent at 7/5/2024 12:04 PM CDT -----  Regarding: Pharmacy call  .Type:  Pharmacy Calling to Clarify an RX    Name of Caller: [place]     Pharmacy Name: [facility]    Prescription Name: oxyCODONE-acetaminophen (PERCOCET)  mg per tablet    What do they need to clarify? shows a drug interactions with another medication; calling to double check that medication is okay to fill     Best Call Back Number: [phone]    Additional Information:

## 2024-07-17 ENCOUNTER — OFFICE VISIT (OUTPATIENT)
Dept: FAMILY MEDICINE | Facility: CLINIC | Age: 54
End: 2024-07-17
Payer: MEDICARE

## 2024-07-17 ENCOUNTER — LAB VISIT (OUTPATIENT)
Dept: LAB | Facility: HOSPITAL | Age: 54
End: 2024-07-17
Attending: FAMILY MEDICINE
Payer: MEDICARE

## 2024-07-17 VITALS
SYSTOLIC BLOOD PRESSURE: 120 MMHG | HEIGHT: 65 IN | TEMPERATURE: 99 F | RESPIRATION RATE: 16 BRPM | DIASTOLIC BLOOD PRESSURE: 78 MMHG | WEIGHT: 172.38 LBS | BODY MASS INDEX: 28.72 KG/M2 | HEART RATE: 72 BPM | OXYGEN SATURATION: 99 %

## 2024-07-17 DIAGNOSIS — I10 ESSENTIAL HYPERTENSION: ICD-10-CM

## 2024-07-17 DIAGNOSIS — Z12.5 SCREENING PSA (PROSTATE SPECIFIC ANTIGEN): ICD-10-CM

## 2024-07-17 DIAGNOSIS — M06.4 INFLAMMATORY POLYARTHRITIS: ICD-10-CM

## 2024-07-17 DIAGNOSIS — M51.37 DEGENERATION OF LUMBAR OR LUMBOSACRAL INTERVERTEBRAL DISC: ICD-10-CM

## 2024-07-17 DIAGNOSIS — M47.899 HLA-B27 SPONDYLOARTHROPATHY: ICD-10-CM

## 2024-07-17 DIAGNOSIS — E78.5 HYPERLIPIDEMIA, ACQUIRED: ICD-10-CM

## 2024-07-17 DIAGNOSIS — Z00.00 ANNUAL PHYSICAL EXAM: ICD-10-CM

## 2024-07-17 DIAGNOSIS — Z00.00 ANNUAL PHYSICAL EXAM: Primary | ICD-10-CM

## 2024-07-17 DIAGNOSIS — R79.9 ABNORMAL FINDING OF BLOOD CHEMISTRY, UNSPECIFIED: ICD-10-CM

## 2024-07-17 DIAGNOSIS — M45.0 ANKYLOSING SPONDYLITIS OF MULTIPLE SITES IN SPINE: ICD-10-CM

## 2024-07-17 DIAGNOSIS — K50.10 CROHN'S DISEASE OF LARGE INTESTINE WITHOUT COMPLICATION: ICD-10-CM

## 2024-07-17 DIAGNOSIS — M47.812 CERVICAL SPONDYLOSIS WITHOUT MYELOPATHY: ICD-10-CM

## 2024-07-17 DIAGNOSIS — L08.9 SKIN INFECTION: ICD-10-CM

## 2024-07-17 LAB
ALBUMIN SERPL BCP-MCNC: 3.4 G/DL (ref 3.5–5.2)
ALBUMIN SERPL BCP-MCNC: 3.4 G/DL (ref 3.5–5.2)
ALP SERPL-CCNC: 86 U/L (ref 55–135)
ALP SERPL-CCNC: 86 U/L (ref 55–135)
ALT SERPL W/O P-5'-P-CCNC: 16 U/L (ref 10–44)
ALT SERPL W/O P-5'-P-CCNC: 16 U/L (ref 10–44)
ANION GAP SERPL CALC-SCNC: 9 MMOL/L (ref 8–16)
ANION GAP SERPL CALC-SCNC: 9 MMOL/L (ref 8–16)
AST SERPL-CCNC: 17 U/L (ref 10–40)
AST SERPL-CCNC: 17 U/L (ref 10–40)
BASOPHILS # BLD AUTO: 0.04 K/UL (ref 0–0.2)
BASOPHILS NFR BLD: 0.7 % (ref 0–1.9)
BILIRUB SERPL-MCNC: 0.3 MG/DL (ref 0.1–1)
BILIRUB SERPL-MCNC: 0.3 MG/DL (ref 0.1–1)
BUN SERPL-MCNC: 17 MG/DL (ref 6–20)
BUN SERPL-MCNC: 17 MG/DL (ref 6–20)
CALCIUM SERPL-MCNC: 8.7 MG/DL (ref 8.7–10.5)
CALCIUM SERPL-MCNC: 8.7 MG/DL (ref 8.7–10.5)
CHLORIDE SERPL-SCNC: 109 MMOL/L (ref 95–110)
CHLORIDE SERPL-SCNC: 109 MMOL/L (ref 95–110)
CHOLEST SERPL-MCNC: 206 MG/DL (ref 120–199)
CHOLEST/HDLC SERPL: 4.7 {RATIO} (ref 2–5)
CO2 SERPL-SCNC: 21 MMOL/L (ref 23–29)
CO2 SERPL-SCNC: 21 MMOL/L (ref 23–29)
COMPLEXED PSA SERPL-MCNC: 1 NG/ML (ref 0–4)
CREAT SERPL-MCNC: 1.3 MG/DL (ref 0.5–1.4)
CREAT SERPL-MCNC: 1.3 MG/DL (ref 0.5–1.4)
DIFFERENTIAL METHOD BLD: ABNORMAL
EOSINOPHIL # BLD AUTO: 0.2 K/UL (ref 0–0.5)
EOSINOPHIL NFR BLD: 3.6 % (ref 0–8)
ERYTHROCYTE [DISTWIDTH] IN BLOOD BY AUTOMATED COUNT: 15.7 % (ref 11.5–14.5)
EST. GFR  (NO RACE VARIABLE): >60 ML/MIN/1.73 M^2
EST. GFR  (NO RACE VARIABLE): >60 ML/MIN/1.73 M^2
ESTIMATED AVG GLUCOSE: 111 MG/DL (ref 68–131)
GLUCOSE SERPL-MCNC: 98 MG/DL (ref 70–110)
GLUCOSE SERPL-MCNC: 98 MG/DL (ref 70–110)
HBA1C MFR BLD: 5.5 % (ref 4–5.6)
HCT VFR BLD AUTO: 34.2 % (ref 40–54)
HDLC SERPL-MCNC: 44 MG/DL (ref 40–75)
HDLC SERPL: 21.4 % (ref 20–50)
HGB BLD-MCNC: 10.8 G/DL (ref 14–18)
HIV 1+2 AB+HIV1 P24 AG SERPL QL IA: NORMAL
IMM GRANULOCYTES # BLD AUTO: 0.01 K/UL (ref 0–0.04)
IMM GRANULOCYTES NFR BLD AUTO: 0.2 % (ref 0–0.5)
LDLC SERPL CALC-MCNC: 149.8 MG/DL (ref 63–159)
LYMPHOCYTES # BLD AUTO: 2.9 K/UL (ref 1–4.8)
LYMPHOCYTES NFR BLD: 51.4 % (ref 18–48)
MCH RBC QN AUTO: 22.9 PG (ref 27–31)
MCHC RBC AUTO-ENTMCNC: 31.6 G/DL (ref 32–36)
MCV RBC AUTO: 73 FL (ref 82–98)
MONOCYTES # BLD AUTO: 0.5 K/UL (ref 0.3–1)
MONOCYTES NFR BLD: 9.6 % (ref 4–15)
NEUTROPHILS # BLD AUTO: 1.9 K/UL (ref 1.8–7.7)
NEUTROPHILS NFR BLD: 34.5 % (ref 38–73)
NONHDLC SERPL-MCNC: 162 MG/DL
NRBC BLD-RTO: 0 /100 WBC
PLATELET # BLD AUTO: 286 K/UL (ref 150–450)
PMV BLD AUTO: 10.5 FL (ref 9.2–12.9)
POTASSIUM SERPL-SCNC: 4.1 MMOL/L (ref 3.5–5.1)
POTASSIUM SERPL-SCNC: 4.1 MMOL/L (ref 3.5–5.1)
PROT SERPL-MCNC: 6.9 G/DL (ref 6–8.4)
PROT SERPL-MCNC: 6.9 G/DL (ref 6–8.4)
RBC # BLD AUTO: 4.71 M/UL (ref 4.6–6.2)
SODIUM SERPL-SCNC: 139 MMOL/L (ref 136–145)
SODIUM SERPL-SCNC: 139 MMOL/L (ref 136–145)
TREPONEMA PALLIDUM IGG+IGM AB [PRESENCE] IN SERUM OR PLASMA BY IMMUNOASSAY: NONREACTIVE
TRIGL SERPL-MCNC: 61 MG/DL (ref 30–150)
TSH SERPL DL<=0.005 MIU/L-ACNC: 1.07 UIU/ML (ref 0.4–4)
WBC # BLD AUTO: 5.6 K/UL (ref 3.9–12.7)

## 2024-07-17 PROCEDURE — 83036 HEMOGLOBIN GLYCOSYLATED A1C: CPT | Mod: HCNC | Performed by: FAMILY MEDICINE

## 2024-07-17 PROCEDURE — 3074F SYST BP LT 130 MM HG: CPT | Mod: HCNC,CPTII,S$GLB, | Performed by: FAMILY MEDICINE

## 2024-07-17 PROCEDURE — 1159F MED LIST DOCD IN RCRD: CPT | Mod: HCNC,CPTII,S$GLB, | Performed by: FAMILY MEDICINE

## 2024-07-17 PROCEDURE — 87389 HIV-1 AG W/HIV-1&-2 AB AG IA: CPT | Mod: HCNC | Performed by: FAMILY MEDICINE

## 2024-07-17 PROCEDURE — 99396 PREV VISIT EST AGE 40-64: CPT | Mod: HCNC,S$GLB,, | Performed by: FAMILY MEDICINE

## 2024-07-17 PROCEDURE — 80061 LIPID PANEL: CPT | Mod: HCNC | Performed by: FAMILY MEDICINE

## 2024-07-17 PROCEDURE — 84443 ASSAY THYROID STIM HORMONE: CPT | Mod: HCNC | Performed by: FAMILY MEDICINE

## 2024-07-17 PROCEDURE — 3008F BODY MASS INDEX DOCD: CPT | Mod: HCNC,CPTII,S$GLB, | Performed by: FAMILY MEDICINE

## 2024-07-17 PROCEDURE — 3078F DIAST BP <80 MM HG: CPT | Mod: HCNC,CPTII,S$GLB, | Performed by: FAMILY MEDICINE

## 2024-07-17 PROCEDURE — 99999 PR PBB SHADOW E&M-EST. PATIENT-LVL IV: CPT | Mod: PBBFAC,HCNC,, | Performed by: FAMILY MEDICINE

## 2024-07-17 PROCEDURE — 36415 COLL VENOUS BLD VENIPUNCTURE: CPT | Mod: HCNC,PO | Performed by: FAMILY MEDICINE

## 2024-07-17 PROCEDURE — 85025 COMPLETE CBC W/AUTO DIFF WBC: CPT | Mod: HCNC | Performed by: FAMILY MEDICINE

## 2024-07-17 PROCEDURE — 80053 COMPREHEN METABOLIC PANEL: CPT | Mod: HCNC | Performed by: FAMILY MEDICINE

## 2024-07-17 PROCEDURE — 86593 SYPHILIS TEST NON-TREP QUANT: CPT | Mod: HCNC | Performed by: FAMILY MEDICINE

## 2024-07-17 PROCEDURE — 84153 ASSAY OF PSA TOTAL: CPT | Mod: HCNC | Performed by: FAMILY MEDICINE

## 2024-07-17 RX ORDER — MUPIROCIN 20 MG/G
OINTMENT TOPICAL 3 TIMES DAILY
Qty: 30 G | Refills: 0 | Status: SHIPPED | OUTPATIENT
Start: 2024-07-17

## 2024-07-17 RX ORDER — PENICILLIN V POTASSIUM 500 MG/1
TABLET, FILM COATED ORAL
COMMUNITY
Start: 2024-06-20

## 2024-07-17 RX ORDER — MUPIROCIN 20 MG/G
OINTMENT TOPICAL 3 TIMES DAILY
Qty: 30 G | Refills: 0 | Status: SHIPPED | OUTPATIENT
Start: 2024-07-17 | End: 2024-07-17 | Stop reason: SDUPTHER

## 2024-07-17 RX ORDER — ADALIMUMAB 40MG/0.8ML
40 KIT SUBCUTANEOUS
Qty: 12 PEN | Refills: 3 | Status: SHIPPED | OUTPATIENT
Start: 2024-07-17

## 2024-07-17 RX ORDER — OXYCODONE AND ACETAMINOPHEN 10; 325 MG/1; MG/1
1 TABLET ORAL EVERY 8 HOURS PRN
Qty: 90 TABLET | Refills: 0 | Status: SHIPPED | OUTPATIENT
Start: 2024-10-03

## 2024-07-17 RX ORDER — OXYCODONE AND ACETAMINOPHEN 10; 325 MG/1; MG/1
1 TABLET ORAL EVERY 4 HOURS PRN
Qty: 90 TABLET | Refills: 0 | Status: SHIPPED | OUTPATIENT
Start: 2024-08-01

## 2024-07-17 RX ORDER — OXYCODONE AND ACETAMINOPHEN 10; 325 MG/1; MG/1
1 TABLET ORAL EVERY 8 HOURS PRN
Qty: 90 TABLET | Refills: 0 | Status: SHIPPED | OUTPATIENT
Start: 2024-08-29

## 2024-07-17 RX ORDER — SULFAMETHOXAZOLE AND TRIMETHOPRIM 800; 160 MG/1; MG/1
TABLET ORAL
COMMUNITY
Start: 2024-07-05
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
## 2024-07-17 NOTE — PROGRESS NOTES
Health Maintenance Due   Topic     TETANUS VACCINE      DEXA Scan      Colorectal Cancer Screening      COVID-19 Vaccine (7 - 2023-24 season) Not given at this facility       Lipid Panel      Shingles Vaccine (2 of 2) hx chickenpox ; inform pt can get vaccine at pharmacy.

## 2024-08-06 ENCOUNTER — PATIENT OUTREACH (OUTPATIENT)
Dept: ADMINISTRATIVE | Facility: HOSPITAL | Age: 54
End: 2024-08-06
Payer: MEDICARE

## 2024-08-30 DIAGNOSIS — M45.0 ANKYLOSING SPONDYLITIS OF MULTIPLE SITES IN SPINE: ICD-10-CM

## 2024-08-30 DIAGNOSIS — M47.899 HLA-B27 SPONDYLOARTHROPATHY: ICD-10-CM

## 2024-08-30 DIAGNOSIS — K50.10 CROHN'S DISEASE OF LARGE INTESTINE WITHOUT COMPLICATION: ICD-10-CM

## 2024-08-30 RX ORDER — ADALIMUMAB 40MG/0.8ML
40 KIT SUBCUTANEOUS
Qty: 12 PEN | Refills: 3 | Status: CANCELLED | OUTPATIENT
Start: 2024-08-30

## 2024-08-30 RX ORDER — ADALIMUMAB 40MG/0.8ML
40 KIT SUBCUTANEOUS
Qty: 12 PEN | Refills: 3 | Status: SHIPPED | OUTPATIENT
Start: 2024-08-30

## 2024-08-30 NOTE — TELEPHONE ENCOUNTER
----- Message from Betina Glaser PharmD sent at 8/30/2024  2:21 PM CDT -----  Regarding: Humira order  Hi Dr. Díaz and Staff,    Yenifer Ronan reached out to us at the specialty pharmacy to fill his Humira. Can [facility] request an electronic order sent in, the patient reports he is completely out of medication and if starting to flare.    Thank you,  Betina Glaser  Clinical Pharmacist  [facility]  P. [phone]  F. [phone]

## 2024-08-30 NOTE — TELEPHONE ENCOUNTER
Care Due:                  Date            Visit Type   Department     Provider  --------------------------------------------------------------------------------                                EP -                              PRIMARY      [facility]  Last Visit: 07-      CARE ([facility])   MEDICINE       Ha Wooten  Next Visit: None Scheduled  None         None Found                                                            Last  Test          Frequency    Reason                     Performed    Due Date  --------------------------------------------------------------------------------    Anti-HCV....  60 months..  adalimumab...............  Not Found    Overdue    HBcAB         12 months..  adalimumab...............  Not Found    Overdue  (Hepatitis B  Core Ab)....    HBsAg         12 months..  adalimumab...............  Not Found    Overdue  (Hepatitis B  Surface  Antigen)....    [employer] Embedded Care Due Messages. Reference number: [id number].   8/30/2024 3:31:33 PM CDT

## 2024-09-01 RX ORDER — ADALIMUMAB 40MG/0.8ML
KIT SUBCUTANEOUS
Qty: 12 PEN | Refills: 3 | OUTPATIENT
Start: 2024-09-01

## 2024-09-02 NOTE — TELEPHONE ENCOUNTER
Quick DC. Inappropriate Request    Refill Authorization Note   Beau Ferraro  is requesting a refill authorization.  Brief Assessment and Rationale for Refill:  Quick Discontinue  Medication Therapy Plan:  discontinued on 6/14/2018 by Linda Art MD    Medication Reconciliation Completed:  No      Comments:     Note composed:9:05 PM 09/01/2024

## 2024-09-03 ENCOUNTER — TELEPHONE (OUTPATIENT)
Dept: FAMILY MEDICINE | Facility: CLINIC | Age: 54
End: 2024-09-03
Payer: MEDICARE

## 2024-09-03 DIAGNOSIS — R94.5 ABNORMAL RESULTS OF LIVER FUNCTION STUDIES: ICD-10-CM

## 2024-09-03 DIAGNOSIS — D84.9 IMMUNODEFICIENCY, UNSPECIFIED: Primary | ICD-10-CM

## 2024-09-03 NOTE — TELEPHONE ENCOUNTER
----- Message from Esteban Monae PharmD sent at 9/3/2024  4:28 PM CDT -----  Regarding: Yani  Good afternoon,    [facility] is working on the Humira order for Mr. Ferraro. The last Hepatitis B and TB labs we have on file for him are from 2012. Patient needs updated labs if appropriate. Please let [facility] know if you have any questions.    Thank you,  Esteban Monae PharmD  Clinical Pharmacist  [facility]  Phone: [phone]  Fax: [phone]

## 2024-09-04 ENCOUNTER — TELEPHONE (OUTPATIENT)
Dept: FAMILY MEDICINE | Facility: CLINIC | Age: 54
End: 2024-09-04
Payer: MEDICARE

## 2024-09-04 NOTE — TELEPHONE ENCOUNTER
Attempted to contact patient to assist in scheduling labs ordered, no answer. Unable to leave message, no voicemail available.

## 2024-09-04 NOTE — TELEPHONE ENCOUNTER
----- Message from Citlali Flores MD sent at 9/3/2024  5:15 PM CDT -----  Regarding: RE: Humira  Orders placed.  Please help patient get scheduled for them.    Citlali  ----- Message -----  From: Esteban Monae PharmD  Sent: 9/3/2024   4:31 PM CDT  To: Citlali Flores MD; #  Subject: Humira                                           Good afternoon,    [facility] is working on the Humira order for Mr. Ferraro. The last Hepatitis B and TB labs we have on file for him are from 2012. Patient needs updated labs if appropriate. Please let [facility] know if you have any questions.    Thank you,  Esteban Monae, PharmD  Clinical Pharmacist  [facility]  Phone: [phone]  Fax: [phone]

## 2024-09-19 ENCOUNTER — CLINICAL SUPPORT (OUTPATIENT)
Dept: ENDOSCOPY | Facility: HOSPITAL | Age: 54
End: 2024-09-19
Attending: FAMILY MEDICINE
Payer: MEDICARE

## 2024-09-19 ENCOUNTER — TELEPHONE (OUTPATIENT)
Dept: ENDOSCOPY | Facility: HOSPITAL | Age: 54
End: 2024-09-19

## 2024-09-19 DIAGNOSIS — Z12.11 ENCOUNTER FOR COLORECTAL CANCER SCREENING: ICD-10-CM

## 2024-09-19 DIAGNOSIS — Z12.12 ENCOUNTER FOR COLORECTAL CANCER SCREENING: ICD-10-CM

## 2024-09-19 NOTE — PLAN OF CARE
Attempted to contact patient to schedule colonoscopy. The patient did not answer the call. Left voice message on mother's contact # requesting a call back at [phone] to get procedure scheduled. Letter also sent.              Endoscopy Scheduling Department  [facility] [address] [place] [address]  Take the At[facility] Elevators to 4th Floor Endoscopy Lab      Date: 9/19/24      Medical Record # [id number]      Dear  Mr Beau Ferraro,    An order for the following procedure(s) Colonoscopy was placed for you by   Dr MEGHAN Wooten.    Please call the scheduling nurse to schedule this procedure as soon as possible.    If you have already scheduled this appointment, please disregard this letter. If you would like to cancel this request, please call the number listed below.     Sincerely,        Endoscopy Scheduling Department [phone]        Comments: Office hours are Monday through Friday 8-430p.

## 2024-09-19 NOTE — TELEPHONE ENCOUNTER
Attempted to contact patient to schedule colonoscopy. The patient did not answer the call. Left voice message on mother's contact # requesting a call back at [phone] to get procedure scheduled. Letter also sent.              Endoscopy Scheduling Department  [facility] [address] [place] [address]  Take the [facility] Elevators to 4th Floor Endoscopy Lab      Date: 9/19/24      Medical Record # [id number]      Dear  Mr Beau Ferraro,    An order for the following procedure(s) Colonoscopy was placed for you by   Dr MEGHAN Wooten.    Please call the scheduling nurse to schedule this procedure as soon as possible.    If you have already scheduled this appointment, please disregard this letter. If you would like to cancel this request, please call the number listed below.     Sincerely,        Endoscopy Scheduling Department [phone]        Comments: Office hours are Monday through Friday 8-430p.

## 2024-09-19 NOTE — TELEPHONE ENCOUNTER
Attempted to reach patient to schedule a colonoscopy. No answer. Call could not be completed x 2. Could not leave patient a message.

## 2024-09-19 NOTE — TELEPHONE ENCOUNTER
----- Message from Jyothi Crowell sent at 9/19/2024  2:15 PM CDT -----    ----- Message -----  From: Raul Garcia MA  Sent: 9/19/2024  12:10 PM CDT  To: [facility] Endoscopy Schedulers    Patient will like to schedule procedure please advise

## 2024-09-23 ENCOUNTER — TELEPHONE (OUTPATIENT)
Dept: DERMATOLOGY | Facility: CLINIC | Age: 54
End: 2024-09-23
Payer: MEDICARE

## 2024-09-24 ENCOUNTER — TELEPHONE (OUTPATIENT)
Dept: DERMATOLOGY | Facility: CLINIC | Age: 54
End: 2024-09-24
Payer: MEDICARE

## 2024-10-02 ENCOUNTER — TELEPHONE (OUTPATIENT)
Dept: FAMILY MEDICINE | Facility: CLINIC | Age: 54
End: 2024-10-02
Payer: MEDICARE

## 2024-10-02 NOTE — TELEPHONE ENCOUNTER
----- Message from Wal[place]ress sent at 10/2/2024 11:24 AM CDT -----  Regarding: Beau  Type:  Sooner Appointment Request     Patient is requesting a sooner appointment.  Patient declined first available appointment listed as well as another facility and provider .  Patient will not accept being placed on the waitlist and is requesting a message be sent to doctor.     Name of Caller: Beau     When is the first available appointment? 02/20    Symptoms: Establish care/ was a patient of Dr. Wooten and was last seen on 07/17/24. Please reach out to the patient for a sooner appointment. Patient is running out of insulin, humera injection and needs refills sent to the pharmacy as well.     Would the patient rather a call back or a response via [facility]? Callback     Best Call Back Number: [phone]      Additional Information:
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
## 2024-10-15 ENCOUNTER — OFFICE VISIT (OUTPATIENT)
Dept: FAMILY MEDICINE | Facility: CLINIC | Age: 54
End: 2024-10-15
Payer: MEDICARE

## 2024-10-15 VITALS
OXYGEN SATURATION: 98 % | DIASTOLIC BLOOD PRESSURE: 76 MMHG | SYSTOLIC BLOOD PRESSURE: 118 MMHG | HEART RATE: 74 BPM | BODY MASS INDEX: 29.05 KG/M2 | HEIGHT: 65 IN | WEIGHT: 174.38 LBS | TEMPERATURE: 99 F

## 2024-10-15 DIAGNOSIS — B35.1 ONYCHOMYCOSIS: ICD-10-CM

## 2024-10-15 DIAGNOSIS — M47.812 CERVICAL SPONDYLOSIS WITHOUT MYELOPATHY: ICD-10-CM

## 2024-10-15 DIAGNOSIS — M06.4 INFLAMMATORY POLYARTHRITIS: ICD-10-CM

## 2024-10-15 DIAGNOSIS — M51.379 DEGENERATION OF INTERVERTEBRAL DISC OF LUMBOSACRAL REGION, UNSPECIFIED WHETHER PAIN PRESENT: ICD-10-CM

## 2024-10-15 DIAGNOSIS — M45.0 ANKYLOSING SPONDYLITIS OF MULTIPLE SITES IN SPINE: ICD-10-CM

## 2024-10-15 DIAGNOSIS — I10 ESSENTIAL HYPERTENSION: ICD-10-CM

## 2024-10-15 DIAGNOSIS — M79.675 PAIN IN TOE OF LEFT FOOT: Primary | ICD-10-CM

## 2024-10-15 DIAGNOSIS — Z23 INFLUENZA VACCINE NEEDED: ICD-10-CM

## 2024-10-15 DIAGNOSIS — K50.10 CROHN'S DISEASE OF LARGE INTESTINE WITHOUT COMPLICATION: ICD-10-CM

## 2024-10-15 DIAGNOSIS — B35.3 TINEA PEDIS OF BOTH FEET: ICD-10-CM

## 2024-10-15 PROCEDURE — 90656 IIV3 VACC NO PRSV 0.5 ML IM: CPT | Mod: HCNC,JZ,S$GLB, | Performed by: INTERNAL MEDICINE

## 2024-10-15 PROCEDURE — 99999 PR PBB SHADOW E&M-EST. PATIENT-LVL V: CPT | Mod: PBBFAC,HCNC,, | Performed by: INTERNAL MEDICINE

## 2024-10-15 PROCEDURE — 3008F BODY MASS INDEX DOCD: CPT | Mod: HCNC,CPTII,S$GLB, | Performed by: INTERNAL MEDICINE

## 2024-10-15 PROCEDURE — 99214 OFFICE O/P EST MOD 30 MIN: CPT | Mod: HCNC,S$GLB,, | Performed by: INTERNAL MEDICINE

## 2024-10-15 PROCEDURE — 96372 THER/PROPH/DIAG INJ SC/IM: CPT | Mod: 59,HCNC,S$GLB, | Performed by: INTERNAL MEDICINE

## 2024-10-15 PROCEDURE — 3074F SYST BP LT 130 MM HG: CPT | Mod: HCNC,CPTII,S$GLB, | Performed by: INTERNAL MEDICINE

## 2024-10-15 PROCEDURE — 3044F HG A1C LEVEL LT 7.0%: CPT | Mod: HCNC,CPTII,S$GLB, | Performed by: INTERNAL MEDICINE

## 2024-10-15 PROCEDURE — 3078F DIAST BP <80 MM HG: CPT | Mod: HCNC,CPTII,S$GLB, | Performed by: INTERNAL MEDICINE

## 2024-10-15 PROCEDURE — 1159F MED LIST DOCD IN RCRD: CPT | Mod: HCNC,CPTII,S$GLB, | Performed by: INTERNAL MEDICINE

## 2024-10-15 PROCEDURE — G0008 ADMIN INFLUENZA VIRUS VAC: HCPCS | Mod: HCNC,S$GLB,, | Performed by: INTERNAL MEDICINE

## 2024-10-15 RX ORDER — OXYCODONE AND ACETAMINOPHEN 10; 325 MG/1; MG/1
1 TABLET ORAL EVERY 4 HOURS PRN
Qty: 78 TABLET | Refills: 0 | Status: SHIPPED | OUTPATIENT
Start: 2025-01-01 | End: 2025-01-14

## 2024-10-15 RX ORDER — OXYCODONE AND ACETAMINOPHEN 10; 325 MG/1; MG/1
1 TABLET ORAL EVERY 8 HOURS PRN
Qty: 90 TABLET | Refills: 0 | Status: SHIPPED | OUTPATIENT
Start: 2024-12-01

## 2024-10-15 RX ORDER — OXYCODONE AND ACETAMINOPHEN 10; 325 MG/1; MG/1
1 TABLET ORAL EVERY 8 HOURS PRN
Qty: 90 TABLET | Refills: 0 | Status: SHIPPED | OUTPATIENT
Start: 2024-11-01

## 2024-10-15 NOTE — ASSESSMENT & PLAN NOTE
- Reviewed the patient's complex medical history, including Crohn's disease.  - Noted the patient's report of a recent Crohn's disease flare-up.  - Inquired about current bowel movements to assess disease activity.  - Acknowledged the severity of Crohn's disease flare-ups.  - Continued Humira injections for Crohn's disease management.  - Instructed the patient to schedule a colonoscopy for further evaluation.

## 2024-10-15 NOTE — ASSESSMENT & PLAN NOTE
- Assessed foot infection concern and diagnosed fungal infection present between toes.  - Explained that the fungal infection is due to toe shape creating a moist environment.  - Educated the patient on the importance of thoroughly drying feet to prevent fungal growth.  - Instructed the patient to continue using antifungal spray between toes.  - Recommend consultation with a podiatrist for further evaluation and management.  - Beau to maintain proper foot hygiene, focusing on keeping the area between toes dry.
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
## 2024-10-15 NOTE — PROGRESS NOTES
Health Maintenance Due   Topic     TETANUS VACCINE   hx chicken pox. Notified pt can get vaccine at pharmacy    Sign Pain Contract  Consult pcp    Naloxone Prescription  Consult pcp    DEXA Scan  Consult pcp    Colorectal Cancer Screening  Consult pcp    Urine Drug Screen  Consult pcp    Influenza Vaccine (1) Pt agree to get today    COVID-19 Vaccine (7 - 2024-25 season) Patient will get another day

## 2024-10-15 NOTE — ASSESSMENT & PLAN NOTE
- Noted the patient's history of high blood pressure.  - Recorded that the patient's blood pressure was within acceptable range during today's visit.  - cont amlodipine

## 2024-10-15 NOTE — ASSESSMENT & PLAN NOTE
- Evaluated swelling in the patient's foot.  - Administered a steroid injection to address foot inflammation.  - Instructed the patient to monitor for any changes in swelling or discomfort.

## 2024-10-15 NOTE — ASSESSMENT & PLAN NOTE
- Reviewed the patient's complex medical history, including ankylosing spondylitis.  - patient back on Mary once a week, is down to 2 inj. Waiting to get labs done to get his next refill   - Educated the patient about potential immune system suppression from Humira and increased infection risk.  - Ordered labs for Humira management.

## 2024-10-15 NOTE — PROGRESS NOTES
Pt tolerated flu vaccine to left deltoid without difficulty; no adverse reaction noted; VIS given  Pt tolerated injection of solumedrol without difficulty; no adverse reaction noted

## 2024-12-26 ENCOUNTER — TELEPHONE (OUTPATIENT)
Dept: RHEUMATOLOGY | Facility: CLINIC | Age: 54
End: 2024-12-26
Payer: MEDICARE

## 2024-12-26 ENCOUNTER — LAB VISIT (OUTPATIENT)
Dept: LAB | Facility: HOSPITAL | Age: 54
End: 2024-12-26
Attending: INTERNAL MEDICINE
Payer: MEDICARE

## 2024-12-26 DIAGNOSIS — M45.0 ANKYLOSING SPONDYLITIS OF MULTIPLE SITES IN SPINE: ICD-10-CM

## 2024-12-26 DIAGNOSIS — M47.899 HLA-B27 SPONDYLOARTHROPATHY: ICD-10-CM

## 2024-12-26 DIAGNOSIS — K50.10 CROHN'S DISEASE OF LARGE INTESTINE WITHOUT COMPLICATION: ICD-10-CM

## 2024-12-26 DIAGNOSIS — D84.9 IMMUNODEFICIENCY, UNSPECIFIED: ICD-10-CM

## 2024-12-26 PROCEDURE — 86480 TB TEST CELL IMMUN MEASURE: CPT | Mod: HCNC | Performed by: INTERNAL MEDICINE

## 2024-12-26 RX ORDER — ADALIMUMAB 40MG/0.8ML
40 KIT SUBCUTANEOUS
Qty: 12 PEN | Refills: 3 | Status: ACTIVE | OUTPATIENT
Start: 2024-12-26

## 2024-12-26 RX ORDER — ADALIMUMAB 40MG/0.8ML
40 KIT SUBCUTANEOUS
Qty: 12 PEN | Refills: 3 | Status: SHIPPED | OUTPATIENT
Start: 2024-12-26 | End: 2024-12-26

## 2024-12-27 LAB
GAMMA INTERFERON BACKGROUND BLD IA-ACNC: 0.04 IU/ML
M TB IFN-G CD4+ BCKGRND COR BLD-ACNC: 0.01 IU/ML
M TB IFN-G CD4+ BCKGRND COR BLD-ACNC: 0.02 IU/ML
MITOGEN IGNF BCKGRD COR BLD-ACNC: 9.96 IU/ML
TB GOLD PLUS: NEGATIVE

## 2024-12-27 NOTE — TELEPHONE ENCOUNTER
----- Message from Kyleigh Orellana sent at 12/26/2024  1:57 PM CST -----  Regarding: Humira  Good afternoon,    [facility] has been filling Humira for Mr. Ferraro which was being prescribed by his primary care provider. His enrollment and Rx were closed out since we were unable to contact him (Humira was last filled September 2024) but he has re-established contact with [facility] and would like to continue Humira. He is establishing care with Dr. Manjarrez this February and wanted to know if Dr. Manjarrez would send an Rx to [facility] for him to continue therapy until he is able to be seen. Please advise.    Thank you,  Esteban Monae, PharmD  Clinical Pharmacist  [facility]  Phone: [phone]  Fax: [phone]

## 2025-01-13 DIAGNOSIS — Z00.00 ENCOUNTER FOR MEDICARE ANNUAL WELLNESS EXAM: ICD-10-CM

## 2025-01-14 ENCOUNTER — OFFICE VISIT (OUTPATIENT)
Dept: FAMILY MEDICINE | Facility: CLINIC | Age: 55
End: 2025-01-14
Payer: MEDICARE

## 2025-01-14 VITALS
BODY MASS INDEX: 28.79 KG/M2 | OXYGEN SATURATION: 99 % | HEIGHT: 65 IN | HEART RATE: 59 BPM | TEMPERATURE: 98 F | WEIGHT: 172.81 LBS | DIASTOLIC BLOOD PRESSURE: 94 MMHG | SYSTOLIC BLOOD PRESSURE: 134 MMHG

## 2025-01-14 DIAGNOSIS — M51.379 DEGENERATION OF INTERVERTEBRAL DISC OF LUMBOSACRAL REGION, UNSPECIFIED WHETHER PAIN PRESENT: ICD-10-CM

## 2025-01-14 DIAGNOSIS — B35.3 TINEA PEDIS OF BOTH FEET: ICD-10-CM

## 2025-01-14 DIAGNOSIS — M47.812 CERVICAL SPONDYLOSIS WITHOUT MYELOPATHY: ICD-10-CM

## 2025-01-14 DIAGNOSIS — M45.0 ANKYLOSING SPONDYLITIS OF MULTIPLE SITES IN SPINE: ICD-10-CM

## 2025-01-14 DIAGNOSIS — K50.10 CROHN'S DISEASE OF LARGE INTESTINE WITHOUT COMPLICATION: ICD-10-CM

## 2025-01-14 DIAGNOSIS — K21.9 GASTROESOPHAGEAL REFLUX DISEASE WITHOUT ESOPHAGITIS: ICD-10-CM

## 2025-01-14 DIAGNOSIS — M06.4 INFLAMMATORY POLYARTHRITIS: ICD-10-CM

## 2025-01-14 DIAGNOSIS — F11.20 OPIOID DEPENDENCE, CONTINUOUS: Primary | ICD-10-CM

## 2025-01-14 DIAGNOSIS — B35.1 ONYCHOMYCOSIS: ICD-10-CM

## 2025-01-14 PROCEDURE — 99999 PR PBB SHADOW E&M-EST. PATIENT-LVL V: CPT | Mod: PBBFAC,HCNC,, | Performed by: INTERNAL MEDICINE

## 2025-01-14 PROCEDURE — 3075F SYST BP GE 130 - 139MM HG: CPT | Mod: HCNC,CPTII,S$GLB, | Performed by: INTERNAL MEDICINE

## 2025-01-14 PROCEDURE — 1159F MED LIST DOCD IN RCRD: CPT | Mod: HCNC,CPTII,S$GLB, | Performed by: INTERNAL MEDICINE

## 2025-01-14 PROCEDURE — 99214 OFFICE O/P EST MOD 30 MIN: CPT | Mod: HCNC,S$GLB,, | Performed by: INTERNAL MEDICINE

## 2025-01-14 PROCEDURE — 3080F DIAST BP >= 90 MM HG: CPT | Mod: HCNC,CPTII,S$GLB, | Performed by: INTERNAL MEDICINE

## 2025-01-14 PROCEDURE — 1160F RVW MEDS BY RX/DR IN RCRD: CPT | Mod: HCNC,CPTII,S$GLB, | Performed by: INTERNAL MEDICINE

## 2025-01-14 PROCEDURE — 3008F BODY MASS INDEX DOCD: CPT | Mod: HCNC,CPTII,S$GLB, | Performed by: INTERNAL MEDICINE

## 2025-01-14 RX ORDER — OXYCODONE AND ACETAMINOPHEN 10; 325 MG/1; MG/1
1 TABLET ORAL EVERY 4 HOURS PRN
Qty: 90 TABLET | Refills: 0 | Status: SHIPPED | OUTPATIENT
Start: 2025-01-14 | End: 2025-02-13

## 2025-01-14 RX ORDER — FAMOTIDINE 40 MG/1
40 TABLET, FILM COATED ORAL 2 TIMES DAILY PRN
Qty: 180 TABLET | Refills: 1 | Status: SHIPPED | OUTPATIENT
Start: 2025-01-14

## 2025-01-14 RX ORDER — OXYCODONE AND ACETAMINOPHEN 10; 325 MG/1; MG/1
1 TABLET ORAL EVERY 8 HOURS PRN
Qty: 90 TABLET | Refills: 0 | Status: SHIPPED | OUTPATIENT
Start: 2025-03-16 | End: 2025-04-15

## 2025-01-14 RX ORDER — CICLOPIROX 80 MG/ML
SOLUTION TOPICAL NIGHTLY
Qty: 6.6 ML | Refills: 0 | Status: SHIPPED | OUTPATIENT
Start: 2025-01-14

## 2025-01-14 RX ORDER — KETOCONAZOLE 20 MG/G
CREAM TOPICAL DAILY
Qty: 60 G | Refills: 0 | Status: SHIPPED | OUTPATIENT
Start: 2025-01-14

## 2025-01-14 RX ORDER — OXYCODONE AND ACETAMINOPHEN 10; 325 MG/1; MG/1
1 TABLET ORAL EVERY 8 HOURS PRN
Qty: 90 TABLET | Refills: 0 | Status: SHIPPED | OUTPATIENT
Start: 2025-02-14 | End: 2025-03-16

## 2025-01-14 NOTE — ASSESSMENT & PLAN NOTE
- Assessed foot infection concern and diagnosed fungal infection present between toes.  - Explained that the fungal infection is due to toe shape creating a moist environment.  - Educated the patient on the importance of thoroughly drying feet to prevent fungal growth.  - Instructed the patient to continue using antifungal spray between toes.  - Recommend consultation with a podiatrist for further evaluation and management.  - Beau to maintain proper foot hygiene, focusing on keeping the area between toes dry.  - start ketoconazole ointment and ciclopirox solution
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
## 2025-01-14 NOTE — ASSESSMENT & PLAN NOTE
Patient has been on percocet at least since 2023  For multiple inflammatory joint issues   Is currently in significant pain as he ran out of medication   90 tablets for one month - Q8 PRN     - patient to complete UDS while in the clinic   - will refill percocet. 3 month prescription printed.

## 2025-01-14 NOTE — PROGRESS NOTES
Back on humira every two weeks  10/10 pain in neck and back, percocet q8h  Tinea pedis and onychomycosis  Cracked skin on feet  Colonoscopy/anemia. Last colonoscopy at age 40. Needs to schedule

## 2025-01-14 NOTE — ASSESSMENT & PLAN NOTE
- Reviewed the patient's complex medical history, including ankylosing spondylitis.  - patient back on [facility] reports improvement in his vision  - has appt with rheum

## 2025-01-20 ENCOUNTER — TELEPHONE (OUTPATIENT)
Dept: PODIATRY | Facility: CLINIC | Age: 55
End: 2025-01-20
Payer: MEDICARE

## 2025-04-11 ENCOUNTER — OFFICE VISIT (OUTPATIENT)
Dept: FAMILY MEDICINE | Facility: CLINIC | Age: 55
End: 2025-04-11
Payer: MEDICARE

## 2025-04-11 VITALS
DIASTOLIC BLOOD PRESSURE: 80 MMHG | TEMPERATURE: 98 F | HEIGHT: 65 IN | SYSTOLIC BLOOD PRESSURE: 124 MMHG | RESPIRATION RATE: 18 BRPM | WEIGHT: 173.31 LBS | HEART RATE: 56 BPM | BODY MASS INDEX: 28.87 KG/M2 | OXYGEN SATURATION: 99 %

## 2025-04-11 DIAGNOSIS — B35.1 ONYCHOMYCOSIS: ICD-10-CM

## 2025-04-11 DIAGNOSIS — R79.9 ABNORMAL FINDING OF BLOOD CHEMISTRY, UNSPECIFIED: ICD-10-CM

## 2025-04-11 DIAGNOSIS — Z23 NEED FOR COVID-19 VACCINE: ICD-10-CM

## 2025-04-11 DIAGNOSIS — F11.20 OPIOID DEPENDENCE, CONTINUOUS: Primary | ICD-10-CM

## 2025-04-11 DIAGNOSIS — M06.4 INFLAMMATORY POLYARTHRITIS: ICD-10-CM

## 2025-04-11 DIAGNOSIS — I10 ESSENTIAL HYPERTENSION: ICD-10-CM

## 2025-04-11 DIAGNOSIS — Z91.81 HISTORY OF FALL: ICD-10-CM

## 2025-04-11 DIAGNOSIS — B35.3 TINEA PEDIS OF BOTH FEET: ICD-10-CM

## 2025-04-11 DIAGNOSIS — M51.379 DEGENERATION OF INTERVERTEBRAL DISC OF LUMBOSACRAL REGION, UNSPECIFIED WHETHER PAIN PRESENT: ICD-10-CM

## 2025-04-11 DIAGNOSIS — Z00.00 ANNUAL PHYSICAL EXAM: ICD-10-CM

## 2025-04-11 DIAGNOSIS — M47.812 CERVICAL SPONDYLOSIS WITHOUT MYELOPATHY: ICD-10-CM

## 2025-04-11 DIAGNOSIS — M79.10 MYALGIA: ICD-10-CM

## 2025-04-11 DIAGNOSIS — E78.5 HYPERLIPIDEMIA, ACQUIRED: ICD-10-CM

## 2025-04-11 DIAGNOSIS — M45.0 ANKYLOSING SPONDYLITIS OF MULTIPLE SITES IN SPINE: ICD-10-CM

## 2025-04-11 PROCEDURE — 99999 PR PBB SHADOW E&M-EST. PATIENT-LVL V: CPT | Mod: PBBFAC,HCNC,, | Performed by: INTERNAL MEDICINE

## 2025-04-11 RX ORDER — OXYCODONE AND ACETAMINOPHEN 10; 325 MG/1; MG/1
1 TABLET ORAL EVERY 8 HOURS PRN
Qty: 18 TABLET | Refills: 0 | Status: SHIPPED | OUTPATIENT
Start: 2025-07-10 | End: 2025-07-16

## 2025-04-11 RX ORDER — CYCLOBENZAPRINE HYDROCHLORIDE 7.5 MG/1
7.5 TABLET, FILM COATED ORAL NIGHTLY PRN
Qty: 30 TABLET | Refills: 0 | Status: SHIPPED | OUTPATIENT
Start: 2025-04-11

## 2025-04-11 RX ORDER — NALOXONE HYDROCHLORIDE 4 MG/.1ML
SPRAY NASAL
COMMUNITY
Start: 2025-01-25

## 2025-04-11 RX ORDER — OXYCODONE AND ACETAMINOPHEN 10; 325 MG/1; MG/1
1 TABLET ORAL EVERY 8 HOURS PRN
Qty: 90 TABLET | Refills: 0 | Status: SHIPPED | OUTPATIENT
Start: 2025-06-10 | End: 2025-04-11

## 2025-04-11 RX ORDER — CICLOPIROX 80 MG/ML
SOLUTION TOPICAL NIGHTLY
Qty: 6.6 ML | Refills: 0 | Status: SHIPPED | OUTPATIENT
Start: 2025-04-11

## 2025-04-11 RX ORDER — OXYCODONE AND ACETAMINOPHEN 10; 325 MG/1; MG/1
1 TABLET ORAL EVERY 8 HOURS PRN
Qty: 90 TABLET | Refills: 0 | Status: SHIPPED | OUTPATIENT
Start: 2025-05-11 | End: 2025-06-10

## 2025-04-11 RX ORDER — AMOXICILLIN 500 MG/1
500 CAPSULE ORAL 3 TIMES DAILY
COMMUNITY
Start: 2025-01-14 | End: 2025-04-11

## 2025-04-11 RX ORDER — OXYCODONE AND ACETAMINOPHEN 10; 325 MG/1; MG/1
1 TABLET ORAL EVERY 8 HOURS PRN
Qty: 90 TABLET | Refills: 0 | Status: SHIPPED | OUTPATIENT
Start: 2025-04-11 | End: 2025-05-11

## 2025-04-11 RX ORDER — OXYCODONE AND ACETAMINOPHEN 10; 325 MG/1; MG/1
1 TABLET ORAL EVERY 8 HOURS PRN
Qty: 90 TABLET | Refills: 0 | Status: SHIPPED | OUTPATIENT
Start: 2025-06-10 | End: 2025-07-16

## 2025-04-11 NOTE — ASSESSMENT & PLAN NOTE
Patient has been on percocet at least since 2023  For multiple inflammatory joint issues   Is currently in significant pain as he ran out of medication   90 tablets for one month - Q8 PRN     - patient to complete UDS today  - will refill percocet. 3 month prescription printed   -  checked

## 2025-04-11 NOTE — PROGRESS NOTES
Health Maintenance Due   Topic     TETANUS VACCINE  Patient advised to go to pharmacy    Sign Pain Contract  Consult PCP    Naloxone Prescription  Consult PCP    DEXA Scan      Colorectal Cancer Screening  Consult PCP    Urine Drug Screen  Consult PCP    COVID-19 Vaccine (7 - 2024-25 season)
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
## 2025-04-11 NOTE — ASSESSMENT & PLAN NOTE
Reports that he fell, after his legs gave out. Fell on his head. Reports a headache since the fall and worsening lower back pain.

## 2025-04-11 NOTE — ASSESSMENT & PLAN NOTE
- Observed signs of rheumatoid arthritis in the patient's hands.  - Evaluated the patient's hand condition.  - Acknowledged the progression of the condition.  - Recommend continued Humira treatment and referral to a rheumatologist for specialized care.

## 2025-07-16 ENCOUNTER — OFFICE VISIT (OUTPATIENT)
Dept: FAMILY MEDICINE | Facility: CLINIC | Age: 55
End: 2025-07-16
Payer: MEDICARE

## 2025-07-16 ENCOUNTER — LAB VISIT (OUTPATIENT)
Dept: LAB | Facility: HOSPITAL | Age: 55
End: 2025-07-16
Payer: MEDICARE

## 2025-07-16 VITALS
TEMPERATURE: 98 F | HEART RATE: 67 BPM | DIASTOLIC BLOOD PRESSURE: 90 MMHG | HEIGHT: 65 IN | OXYGEN SATURATION: 100 % | WEIGHT: 175.25 LBS | RESPIRATION RATE: 18 BRPM | BODY MASS INDEX: 29.2 KG/M2 | SYSTOLIC BLOOD PRESSURE: 160 MMHG

## 2025-07-16 DIAGNOSIS — M81.8 STEROID-INDUCED OSTEOPOROSIS: ICD-10-CM

## 2025-07-16 DIAGNOSIS — Z00.00 ANNUAL PHYSICAL EXAM: Primary | ICD-10-CM

## 2025-07-16 DIAGNOSIS — F11.20 OPIOID DEPENDENCE, CONTINUOUS: ICD-10-CM

## 2025-07-16 DIAGNOSIS — K50.10 CROHN'S DISEASE OF LARGE INTESTINE WITHOUT COMPLICATION: ICD-10-CM

## 2025-07-16 DIAGNOSIS — E55.9 VITAMIN D INSUFFICIENCY: ICD-10-CM

## 2025-07-16 DIAGNOSIS — M06.4 INFLAMMATORY POLYARTHRITIS: ICD-10-CM

## 2025-07-16 DIAGNOSIS — I10 ESSENTIAL HYPERTENSION: ICD-10-CM

## 2025-07-16 DIAGNOSIS — R79.9 ABNORMAL FINDING OF BLOOD CHEMISTRY, UNSPECIFIED: ICD-10-CM

## 2025-07-16 DIAGNOSIS — M45.0 ANKYLOSING SPONDYLITIS OF MULTIPLE SITES IN SPINE: ICD-10-CM

## 2025-07-16 DIAGNOSIS — Z00.00 ANNUAL PHYSICAL EXAM: ICD-10-CM

## 2025-07-16 DIAGNOSIS — T38.0X5A STEROID-INDUCED OSTEOPOROSIS: ICD-10-CM

## 2025-07-16 DIAGNOSIS — M47.812 CERVICAL SPONDYLOSIS WITHOUT MYELOPATHY: ICD-10-CM

## 2025-07-16 DIAGNOSIS — E78.5 HYPERLIPIDEMIA, ACQUIRED: ICD-10-CM

## 2025-07-16 DIAGNOSIS — M51.379 DEGENERATION OF INTERVERTEBRAL DISC OF LUMBOSACRAL REGION, UNSPECIFIED WHETHER PAIN PRESENT: ICD-10-CM

## 2025-07-16 DIAGNOSIS — L30.9 DERMATITIS: ICD-10-CM

## 2025-07-16 LAB
ABSOLUTE EOSINOPHIL (OHS): 0.12 K/UL
ABSOLUTE MONOCYTE (OHS): 0.73 K/UL (ref 0.3–1)
ABSOLUTE NEUTROPHIL COUNT (OHS): 1.87 K/UL (ref 1.8–7.7)
ALBUMIN SERPL BCP-MCNC: 3.5 G/DL (ref 3.5–5.2)
ALP SERPL-CCNC: 103 UNIT/L (ref 40–150)
ALT SERPL W/O P-5'-P-CCNC: 21 UNIT/L (ref 10–44)
ANION GAP (OHS): 11 MMOL/L (ref 8–16)
AST SERPL-CCNC: 32 UNIT/L (ref 11–45)
BASOPHILS # BLD AUTO: 0.04 K/UL
BASOPHILS NFR BLD AUTO: 0.6 %
BILIRUB SERPL-MCNC: 0.2 MG/DL (ref 0.1–1)
BUN SERPL-MCNC: 14 MG/DL (ref 6–20)
CALCIUM SERPL-MCNC: 8.4 MG/DL (ref 8.7–10.5)
CHLORIDE SERPL-SCNC: 106 MMOL/L (ref 95–110)
CHOLEST SERPL-MCNC: 221 MG/DL (ref 120–199)
CHOLEST/HDLC SERPL: 4.6 {RATIO} (ref 2–5)
CO2 SERPL-SCNC: 24 MMOL/L (ref 23–29)
CREAT SERPL-MCNC: 1.1 MG/DL (ref 0.5–1.4)
EAG (OHS): 108 MG/DL (ref 68–131)
ERYTHROCYTE [DISTWIDTH] IN BLOOD BY AUTOMATED COUNT: 16.1 % (ref 11.5–14.5)
GFR SERPLBLD CREATININE-BSD FMLA CKD-EPI: >60 ML/MIN/1.73/M2
GLUCOSE SERPL-MCNC: 76 MG/DL (ref 70–110)
HBA1C MFR BLD: 5.4 % (ref 4–5.6)
HCT VFR BLD AUTO: 35.5 % (ref 40–54)
HDLC SERPL-MCNC: 48 MG/DL (ref 40–75)
HDLC SERPL: 21.7 % (ref 20–50)
HGB BLD-MCNC: 11 GM/DL (ref 14–18)
IMM GRANULOCYTES # BLD AUTO: 0.01 K/UL (ref 0–0.04)
IMM GRANULOCYTES NFR BLD AUTO: 0.2 % (ref 0–0.5)
LDLC SERPL CALC-MCNC: 161.2 MG/DL (ref 63–159)
LYMPHOCYTES # BLD AUTO: 3.62 K/UL (ref 1–4.8)
MCH RBC QN AUTO: 22.6 PG (ref 27–31)
MCHC RBC AUTO-ENTMCNC: 31 G/DL (ref 32–36)
MCV RBC AUTO: 73 FL (ref 82–98)
NONHDLC SERPL-MCNC: 173 MG/DL
NUCLEATED RBC (/100WBC) (OHS): 0 /100 WBC
PLATELET # BLD AUTO: 257 K/UL (ref 150–450)
PMV BLD AUTO: 10.8 FL (ref 9.2–12.9)
POTASSIUM SERPL-SCNC: 3.7 MMOL/L (ref 3.5–5.1)
PROT SERPL-MCNC: 7.6 GM/DL (ref 6–8.4)
RBC # BLD AUTO: 4.87 M/UL (ref 4.6–6.2)
RELATIVE EOSINOPHIL (OHS): 1.9 %
RELATIVE LYMPHOCYTE (OHS): 56.7 % (ref 18–48)
RELATIVE MONOCYTE (OHS): 11.4 % (ref 4–15)
RELATIVE NEUTROPHIL (OHS): 29.2 % (ref 38–73)
SODIUM SERPL-SCNC: 141 MMOL/L (ref 136–145)
TRIGL SERPL-MCNC: 59 MG/DL (ref 30–150)
TSH SERPL-ACNC: 1.02 UIU/ML (ref 0.4–4)
WBC # BLD AUTO: 6.39 K/UL (ref 3.9–12.7)

## 2025-07-16 PROCEDURE — 83036 HEMOGLOBIN GLYCOSYLATED A1C: CPT | Mod: HCNC

## 2025-07-16 PROCEDURE — 80355 GABAPENTIN NON-BLOOD: CPT | Mod: HCNC | Performed by: INTERNAL MEDICINE

## 2025-07-16 PROCEDURE — 99999 PR PBB SHADOW E&M-EST. PATIENT-LVL V: CPT | Mod: PBBFAC,HCNC,, | Performed by: INTERNAL MEDICINE

## 2025-07-16 PROCEDURE — 82465 ASSAY BLD/SERUM CHOLESTEROL: CPT | Mod: HCNC

## 2025-07-16 PROCEDURE — 85025 COMPLETE CBC W/AUTO DIFF WBC: CPT | Mod: HCNC

## 2025-07-16 PROCEDURE — 84443 ASSAY THYROID STIM HORMONE: CPT | Mod: HCNC

## 2025-07-16 PROCEDURE — 36415 COLL VENOUS BLD VENIPUNCTURE: CPT | Mod: HCNC,PO

## 2025-07-16 PROCEDURE — 80053 COMPREHEN METABOLIC PANEL: CPT | Mod: HCNC

## 2025-07-16 RX ORDER — OXYCODONE AND ACETAMINOPHEN 10; 325 MG/1; MG/1
1 TABLET ORAL EVERY 8 HOURS PRN
Qty: 90 TABLET | Refills: 0 | Status: SHIPPED | OUTPATIENT
Start: 2025-07-16 | End: 2025-08-15

## 2025-07-16 RX ORDER — SULFAMETHOXAZOLE AND TRIMETHOPRIM 800; 160 MG/1; MG/1
TABLET ORAL
COMMUNITY
Start: 2025-05-09 | End: 2025-07-16 | Stop reason: ALTCHOICE

## 2025-07-16 RX ORDER — CLINDAMYCIN HYDROCHLORIDE 300 MG/1
300 CAPSULE ORAL 2 TIMES DAILY
COMMUNITY
Start: 2025-05-26 | End: 2025-07-16

## 2025-07-16 RX ORDER — DOXYCYCLINE HYCLATE 100 MG
100 TABLET ORAL 2 TIMES DAILY
COMMUNITY
Start: 2025-07-09 | End: 2025-07-16 | Stop reason: ALTCHOICE

## 2025-07-16 RX ORDER — ERGOCALCIFEROL 1.25 MG/1
50000 CAPSULE ORAL
Qty: 12 CAPSULE | Refills: 3 | Status: SHIPPED | OUTPATIENT
Start: 2025-07-16 | End: 2026-07-16

## 2025-07-16 RX ORDER — OXYCODONE AND ACETAMINOPHEN 10; 325 MG/1; MG/1
1 TABLET ORAL EVERY 8 HOURS PRN
Qty: 90 TABLET | Refills: 0 | Status: SHIPPED | OUTPATIENT
Start: 2025-08-16 | End: 2025-09-15

## 2025-07-16 RX ORDER — MUPIROCIN 20 MG/G
OINTMENT TOPICAL 2 TIMES DAILY
Qty: 60 G | Refills: 0 | Status: SHIPPED | OUTPATIENT
Start: 2025-07-16

## 2025-07-16 RX ORDER — NYSTATIN 100000 [USP'U]/G
POWDER TOPICAL 2 TIMES DAILY
COMMUNITY
Start: 2025-07-09

## 2025-07-16 RX ORDER — OXYCODONE AND ACETAMINOPHEN 10; 325 MG/1; MG/1
1 TABLET ORAL EVERY 8 HOURS PRN
Qty: 90 TABLET | Refills: 0 | Status: SHIPPED | OUTPATIENT
Start: 2025-09-16 | End: 2025-10-16

## 2025-07-16 RX ORDER — MUPIROCIN 20 MG/G
OINTMENT TOPICAL 2 TIMES DAILY
COMMUNITY
Start: 2025-05-26 | End: 2025-07-16 | Stop reason: SDUPTHER

## 2025-07-16 NOTE — ASSESSMENT & PLAN NOTE
- Beau reports stiffness and pain in hands with difficulty holding things and dropping items.  - Evaluated arthritis in spine and ankle, noting swelling in knees and feet.  - Confirmed diagnosis previously made by Dr. Damon (now retired).  - Beau has been on Humira which provides some relief but stiffness and pain persist.  - Will continue Humira injections weekly or as needed for management.  - Referred patient to a new female rheumatologist at [facility].

## 2025-07-16 NOTE — ASSESSMENT & PLAN NOTE
- Bone density scan from 2013 showed osteoporosis of the hip.  - Assessed vitamin D needs, considering the impact of long-term steroid use on bone health.  - Explained importance of vitamin D for bone strengthening and fracture prevention.  - Prescribed weekly vitamin D supplement.  - Ordered DEXA scan at main campus to monitor progression.

## 2025-07-16 NOTE — ASSESSMENT & PLAN NOTE
- Beau reports skin infection on leg and side, previously treated with mupirocin cream.  - Current assessment shows dryness but improvement in infection with residual itchiness.  - Advised against scratching to prevent reopening wounds and potential infection.  - Recommend gentle rubbing instead if necessary, explaining that itching can be a sign of healing.  - Will continue mupirocin cream application as needed.

## 2025-07-16 NOTE — ASSESSMENT & PLAN NOTE
- Beau reports painful flare-ups and abscesses in the past  - Current management with Mary is helping control symptoms.  - Discussed increased infection risk due to immunosuppressive treatment.  - Prescribed mupirocin for early signs of skin infections and advised on antibiotic use when necessary.
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
## 2025-07-16 NOTE — ASSESSMENT & PLAN NOTE
Patient has been on percocet at least since 2023  For multiple inflammatory joint issues   Is currently in significant pain as he ran out of medication   90 tablets for one month - Q8 PRN     - Beau reports chronic pain in back and neck, affecting mobility and exacerbated by weather changes and physical activity.  - Continuing Percocet (oxycodone/acetaminophen) 3 times daily for pain management.  - Ordered annual urine drug screen before refilling oxycodone prescription.

## 2025-07-16 NOTE — PROGRESS NOTES
Health Maintenance Due   Topic     TETANUS VACCINE  Patient advised to go to pharmacy    Sign Pain Contract  Consult PCP    DEXA Scan      Colorectal Cancer Screening  Consult PCP    Urine Drug Screen  Consult PCP    COVID-19 Vaccine (8 - 2024-25 season) Patient declined    Lipid Panel  Consult PCP